# Patient Record
Sex: FEMALE | Race: WHITE | NOT HISPANIC OR LATINO | Employment: OTHER | URBAN - NONMETROPOLITAN AREA
[De-identification: names, ages, dates, MRNs, and addresses within clinical notes are randomized per-mention and may not be internally consistent; named-entity substitution may affect disease eponyms.]

---

## 2017-01-25 ENCOUNTER — TRANSCRIBE ORDERS (OUTPATIENT)
Dept: PHYSICAL THERAPY | Facility: HOSPITAL | Age: 52
End: 2017-01-25

## 2017-01-25 DIAGNOSIS — R07.89 POSTERIOR CHEST PAIN: Primary | ICD-10-CM

## 2017-02-01 ENCOUNTER — HOSPITAL ENCOUNTER (OUTPATIENT)
Dept: PHYSICAL THERAPY | Facility: HOSPITAL | Age: 52
Setting detail: THERAPIES SERIES
Discharge: HOME | End: 2017-02-01

## 2017-02-01 ENCOUNTER — APPOINTMENT (OUTPATIENT)
Dept: PHYSICAL THERAPY | Facility: HOSPITAL | Age: 52
End: 2017-02-01

## 2017-02-01 DIAGNOSIS — M54.50 CHRONIC BILATERAL LOW BACK PAIN WITHOUT SCIATICA: Primary | ICD-10-CM

## 2017-02-01 DIAGNOSIS — R07.81 RIB PAIN ON LEFT SIDE: ICD-10-CM

## 2017-02-01 DIAGNOSIS — G89.29 CHRONIC BILATERAL LOW BACK PAIN WITHOUT SCIATICA: Primary | ICD-10-CM

## 2017-02-01 DIAGNOSIS — M25.552 LEFT HIP PAIN: ICD-10-CM

## 2017-02-01 PROCEDURE — 97110 THERAPEUTIC EXERCISES: CPT | Performed by: PHYSICAL THERAPIST

## 2017-02-01 PROCEDURE — 97162 PT EVAL MOD COMPLEX 30 MIN: CPT | Performed by: PHYSICAL THERAPIST
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
## 2017-02-13 ENCOUNTER — HOSPITAL ENCOUNTER (OUTPATIENT)
Dept: PHYSICAL THERAPY | Facility: HOSPITAL | Age: 52
Setting detail: THERAPIES SERIES
Discharge: HOME | End: 2017-02-13

## 2017-02-13 DIAGNOSIS — M25.552 LEFT HIP PAIN: ICD-10-CM

## 2017-02-13 DIAGNOSIS — R07.81 RIB PAIN ON LEFT SIDE: ICD-10-CM

## 2017-02-13 DIAGNOSIS — M54.50 CHRONIC BILATERAL LOW BACK PAIN WITHOUT SCIATICA: Primary | ICD-10-CM

## 2017-02-13 DIAGNOSIS — G89.29 CHRONIC BILATERAL LOW BACK PAIN WITHOUT SCIATICA: Primary | ICD-10-CM

## 2017-02-13 PROCEDURE — 97110 THERAPEUTIC EXERCISES: CPT

## 2017-02-13 PROCEDURE — 97140 MANUAL THERAPY 1/> REGIONS: CPT

## 2017-02-13 NOTE — PROGRESS NOTES
Outpatient Physical Therapy Ortho Treatment Note  [facility]     Patient Name: Shirley Colby  : 1965  MRN: [id number]  Today's Date: 2017      Visit Date: 2017    Visit Dx:    ICD-10-CM ICD-9-CM   1. Chronic bilateral low back pain without sciatica M54.5 724.2    G89.29 338.29   2. Left hip pain M25.552 719.45   3. Rib pain on left side R07.81 786.50       There is no problem list on file for this patient.       Past Medical History   Diagnosis Date   • Chronic back pain    • Diabetes mellitus    • GERD (gastroesophageal reflux disease)    • Hyperlipidemia    • Hypertension    • Seizures         Past Surgical History   Procedure Laterality Date   • Hysterectomy                               PT Assessment/Plan       17 0918          PT Assessment    Assessment Comments This was patient's initial session post evaluation. She reported that her pain is about the same and she is having some difficulty with her HEP. Her L lower rib cage and lower thoracic are still painful along with her main complaint being just medial to her scapula and upward. Her thoracic pain may be due to a slightly rotated T11-12 segment towards the left, but she is so guarded it is difficult to truly assess. Her L shoulder blade pain appears to be from increased guarding as well along with increased scapular mobility compared to the right. She was heavily educated regarding pain and fear avoidance today, we will continue to address this and focus on promoting proper movement patterns.    -TC      PT Plan    PT Plan Comments see assessment   -TC        User Key  (r) = Recorded By, (t) = Taken By, (c) = Cosigned By    Initials Name Provider Type    TC Pam Clark, PT Physical Therapist                    Exercises       17 9517          Subjective Comments    Subjective Comments Pt reported that she still feels the same. Her pain is mainly in her L shoulder blade and up into her L neck today. Her L hip and  back are still the same pain  -TC      Subjective Pain    Able to rate subjective pain? yes  -TC      Pre-Treatment Pain Level 6  -TC      Post-Treatment Pain Level 6  -TC      Exercise 1    Exercise Name 1 thoracic rotation in sidelying    B  -TC      Cueing 1 Verbal;Tactile  -TC      Sets 1 2  -TC      Reps 1 15  -TC      Exercise 2    Exercise Name 2 sidelying ipsilateral L shldr blade squeeze  -TC      Cueing 2 Verbal;Tactile;Demo  -TC      Sets 2 2  -TC      Reps 2 10  -TC      Exercise 3    Exercise Name 3 unweighted shoulder blade sqeezes   -TC      Cueing 3 Verbal;Tactile;Demo  -TC      Time (Minutes) 3 5  -TC      Exercise 4    Exercise Name 4 unweighted cervical rotation   -TC      Cueing 4 Verbal;Tactile;Demo  -TC      Time (Minutes) 4 5  -TC      Exercise 5    Exercise Name 5 unweighted cervical flexion   -TC      Cueing 5 Demo  -TC      Time (Minutes) 5 5  -TC        User Key  (r) = Recorded By, (t) = Taken By, (c) = Cosigned By    Initials Name Provider Type    TC Pam Clark, PT Physical Therapist                        Manual Rx (last 36 hours)      Manual Treatments       02/13/17 0847          Manual Rx 1    Manual Rx 1 Location thoracolumbar rosmery   L T11-12 increased tenderness guarding noted   -TC      Manual Rx 1 Type STM   -TC      Manual Rx 1 Grade light pressure  -TC      Manual Rx 1 Duration 10  -TC      Manual Rx 2    Manual Rx 2 Location L medial border of scapula, rhomboids, LS and UT  -TC      Manual Rx 2 Type STM   -TC      Manual Rx 2 Grade light   -TC      Manual Rx 2 Duration 10  -TC        User Key  (r) = Recorded By, (t) = Taken By, (c) = Cosigned By    Initials Name Provider Type    TC Pam Clark, PT Physical Therapist                PT OP Goals       02/13/17 0847 02/01/17 0843       PT Short Term Goals    STG Date to Achieve 03/01/17  -TC 03/01/17  -KR     STG 1 Pt will report pain no greater than 4-5/10 with all daily activities.   -TC Pt will report pain no  greater than 4-5/10 with all daily activities.   -KR     STG 1 Progress Ongoing  -TC New  -KR     STG 1 Progress Comments 6/10 today   -TC      STG 2 Pt will score 50% or less on Oswestry.   -TC Pt will score 50% or less on Oswestry.   -KR     STG 2 Progress Ongoing  -TC New  -KR     Long Term Goals    LTG Date to Achieve 03/29/17  -TC 03/29/17  -KR     LTG 1 Pt will demontrate cervical rotation 65 degrees or greater.   -TC Pt will demontrate cervical rotation 65 degrees or greater.   -KR     LTG 1 Progress Ongoing  -TC New  -KR     LTG 1 Progress Comments worked on cervical motion today, not over 6/10 pain   -TC      LTG 2 Pt will demonstrated throacic rotation 50% or greater.   -TC Pt will demonstrated throacic rotation 50% or greater.   -KR     LTG 2 Progress Ongoing  -TC New  -KR     LTG 2 Progress Comments still very limited d/t guarding and pain   -TC      LTG 3 Pt will perform tandem stance for 20 seconds or greater.   -TC Pt will perform tandem stance for 20 seconds or greater.   -KR     LTG 3 Progress Ongoing  -TC New  -KR     LTG 4 Pt will report being able to complete hair hygene activities without increase in pain.   -TC Pt will report being able to complete hair hygene activities without increase in pain.   -KR     LTG 4 Progress Ongoing  -TC New  -KR     LTG 5 Pt will core 30% or less on Oswestry.   -TC Pt will core 30% or less on Oswestry.   -KR     LTG 5 Progress Ongoing  -TC New  -KR     Time Calculation    PT Goal Re-Cert Due Date 03/03/17  -TC 03/03/17  -KR       User Key  (r) = Recorded By, (t) = Taken By, (c) = Cosigned By    Initials Name Provider Type    GASTON Chirinos, PT Physical Therapist    TC Pam Clark, PT Physical Therapist                Therapy Education       02/13/17 0949    Therapy Education    Given HEP;Pain management;Posture/body mechanics   fear avoidance education, physiology of pain, good movement patterns, posture importance and added new HEP   -TC    Program  New   avoid Ws, added unweighted scap retraction, cervical rot and flexion   -TC    How Provided Verbal;Demonstration;Written  -TC    Provided to Patient  -TC    Level of Understanding Teach back education performed;Demonstrated;Verbalized  -TC      User Key  (r) = Recorded By, (t) = Taken By, (c) = Cosigned By    Initials Name Provider Type    TC Pam Clark, PT Physical Therapist                Time Calculation:   Start Time: 0847  Stop Time: 0934  Time Calculation (min): 47 min  Total Timed Code Minutes- PT: 47 minute(s)    Therapy Charges for Today     Code Description Service Date Service Provider Modifiers Qty    81843902193 HC PT MANUAL THERAPY EA 15 MIN 2/13/2017 Pam Clark, PT GP 1    31322177553 HC PT THER PROC EA 15 MIN 2/13/2017 Pam Clark, PT GP 2                    Pam Clark, PT  2/13/2017

## 2017-02-17 ENCOUNTER — HOSPITAL ENCOUNTER (OUTPATIENT)
Dept: PHYSICAL THERAPY | Facility: HOSPITAL | Age: 52
Setting detail: THERAPIES SERIES
Discharge: HOME | End: 2017-02-17

## 2017-02-17 DIAGNOSIS — M25.552 LEFT HIP PAIN: ICD-10-CM

## 2017-02-17 DIAGNOSIS — G89.29 CHRONIC BILATERAL LOW BACK PAIN WITHOUT SCIATICA: Primary | ICD-10-CM

## 2017-02-17 DIAGNOSIS — M54.50 CHRONIC BILATERAL LOW BACK PAIN WITHOUT SCIATICA: Primary | ICD-10-CM

## 2017-02-17 DIAGNOSIS — R07.81 RIB PAIN ON LEFT SIDE: ICD-10-CM

## 2017-02-17 PROCEDURE — 97140 MANUAL THERAPY 1/> REGIONS: CPT | Performed by: PHYSICAL THERAPIST

## 2017-02-17 PROCEDURE — 97110 THERAPEUTIC EXERCISES: CPT | Performed by: PHYSICAL THERAPIST

## 2017-02-17 NOTE — PROGRESS NOTES
Outpatient Physical Therapy Ortho Treatment Note  [facility]     Patient Name: Shirley Colby  : 1965  MRN: [id number]  Today's Date: 2017      Visit Date: 2017    Visit Dx:    ICD-10-CM ICD-9-CM   1. Chronic bilateral low back pain without sciatica M54.5 724.2    G89.29 338.29   2. Left hip pain M25.552 719.45   3. Rib pain on left side R07.81 786.50       There is no problem list on file for this patient.       Past Medical History   Diagnosis Date   • Chronic back pain    • Diabetes mellitus    • GERD (gastroesophageal reflux disease)    • Hyperlipidemia    • Hypertension    • Seizures         Past Surgical History   Procedure Laterality Date   • Hysterectomy                               PT Assessment/Plan       1730 17 0942       PT Assessment    Assessment Comments She does not tolerate manual therapy very well, even though we have educated on pain science and fear avoidance. She is still having muscle guarding throughout her left side causing decreased mobility and difficulty with ADLs  -KR This was patient's initial session post evaluation. She reported that her pain is about the same and she is having some difficulty with her HEP. Her L lower rib cage and lower thoracic are still painful along with her main complaint being just medial to her scapula and upward. Her thoracic pain may be due to a slightly rotated T11-12 segment towards the left, but she is so guarded it is difficult to truly assess. Her L shoulder blade pain appears to be from increased guarding as well along with increased scapular mobility compared to the right. She was heavily educated regarding pain and fear avoidance today, we will continue to address this and focus on promoting proper movement patterns.    -TC     PT Plan    PT Plan Comments Continue to educate on fear avoidance. Work soft tissue and progress with mobiltiy as tolerated.   -KR see assessment   -TC       User Key  (r) = Recorded By,  (t) = Taken By, (c) = Cosigned By    Initials Name Provider Type    GASTON Chirinos, PT Physical Therapist    TC Pam Clark, PT Physical Therapist                    Exercises       02/17/17 0930          Subjective Comments    Subjective Comments She reports hurting today, didn't sleep well last night.   -KR      Subjective Pain    Able to rate subjective pain? yes  -KR      Pre-Treatment Pain Level 6  -KR      Post-Treatment Pain Level 6  -KR      Subjective Pain Comment L neck and lower back and  breast  -KR      Exercise 1    Exercise Name 1 hooklying LTR in very small motion  -KR      Sets 1 2  -KR      Reps 1 10  -KR      Exercise 2    Exercise Name 2 PPT   -KR      Sets 2 2  -KR      Reps 2 10  -KR      Exercise 3    Exercise Name 3 unweighted in chair with towel roll behind thoracic spine; cervical rotation, then scapular squeezes, cervical flexion  -KR      Sets 3 2  -KR      Reps 3 10  -KR      Exercise 4    Exercise Name 4 seated unsupported scapular reraction in small motion  -KR      Sets 4 3  -KR      Reps 4 5  -KR        User Key  (r) = Recorded By, (t) = Taken By, (c) = Cosigned By    Initials Name Provider Type    GASTON Chirinos, PT Physical Therapist                        Manual Rx (last 36 hours)      Manual Treatments       02/17/17 0930          Manual Rx 1    Manual Rx 1 Location prone thoracolumbar paraspinals  -KR      Manual Rx 1 Type STM  -KR      Manual Rx 1 Grade very light  -KR      Manual Rx 1 Duration 15  -KR        User Key  (r) = Recorded By, (t) = Taken By, (c) = Cosigned By    Initials Name Provider Type    GASTON Chirinos PT Physical Therapist                PT OP Goals       02/17/17 0930 02/13/17 0847       PT Short Term Goals    STG Date to Achieve 03/01/17  -KR 03/01/17  -TC     STG 1 Pt will report pain no greater than 4-5/10 with all daily activities.   -KR Pt will report pain no greater than 4-5/10 with all daily activities.   -TC     STG 1  Progress Ongoing  -KR Ongoing  -TC     STG 1 Progress Comments 6/10 today  -KR 6/10 today   -TC     STG 2 Pt will score 50% or less on Oswestry.   -KR Pt will score 50% or less on Oswestry.   -TC     STG 2 Progress Ongoing  -KR Ongoing  -TC     Long Term Goals    LTG Date to Achieve 03/29/17  -KR 03/29/17  -TC     LTG 1 Pt will demontrate cervical rotation 65 degrees or greater.   -KR Pt will demontrate cervical rotation 65 degrees or greater.   -TC     LTG 1 Progress Ongoing  -KR Ongoing  -TC     LTG 1 Progress Comments 45 L 35 R (pain R)  -KR worked on cervical motion today, not over 6/10 pain   -TC     LTG 2 Pt will demonstrated throacic rotation 50% or greater.   -KR Pt will demonstrated throacic rotation 50% or greater.   -TC     LTG 2 Progress Ongoing  -KR Ongoing  -TC     LTG 2 Progress Comments  still very limited d/t guarding and pain   -TC     LTG 3 Pt will perform tandem stance for 20 seconds or greater.   -KR Pt will perform tandem stance for 20 seconds or greater.   -TC     LTG 3 Progress Ongoing  -KR Ongoing  -TC     LTG 4 Pt will report being able to complete hair hygene activities without increase in pain.   -KR Pt will report being able to complete hair hygene activities without increase in pain.   -TC     LTG 4 Progress Ongoing  -KR Ongoing  -TC     LTG 5 Pt will core 30% or less on Oswestry.   -KR Pt will core 30% or less on Oswestry.   -     LTG 5 Progress Ongoing  -KR Ongoing  -     Time Calculation    PT Goal Re-Cert Due Date 03/03/17  -KR 03/03/17  -TC       User Key  (r) = Recorded By, (t) = Taken By, (c) = Cosigned By    Initials Name Provider Type    GASTON Chirinos, PT Physical Therapist    TC Pam Clark, PT Physical Therapist                Therapy Education       02/17/17 0930    Therapy Education    Given HEP;Symptoms/condition management;Posture/body mechanics;Pain management  -KR    Program Reinforced  -KR    How Provided Verbal  -KR    Provided to Patient  -KR     Level of Understanding Verbalized  -KR      02/13/17 0941    Therapy Education    Given HEP;Pain management;Posture/body mechanics   fear avoidance education, physiology of pain, good movement patterns, posture importance and added new HEP   -TC    Program New   avoid Ws, added unweighted scap retraction, cervical rot and flexion   -TC    How Provided Verbal;Demonstration;Written  -TC    Provided to Patient  -TC    Level of Understanding Teach back education performed;Demonstrated;Verbalized  -TC      User Key  (r) = Recorded By, (t) = Taken By, (c) = Cosigned By    Initials Name Provider Type    GASTON Chirinos, PT Physical Therapist    TC Pam Clark, PT Physical Therapist                Time Calculation:   Start Time: 0930  Stop Time: 1012  Time Calculation (min): 42 min  Total Timed Code Minutes- PT: 42 minute(s)    Therapy Charges for Today     Code Description Service Date Service Provider Modifiers Qty    [id number]  PT THER PROC EA 15 MIN 2/17/2017 Jessica Chirinos, PT GP 2    [id number]  PT MANUAL THERAPY EA 15 MIN 2/17/2017 Jessica Chirinos, PT GP 1                    Jessica Chirinos, PT  2/17/2017

## 2017-02-23 ENCOUNTER — HOSPITAL ENCOUNTER (OUTPATIENT)
Dept: PHYSICAL THERAPY | Facility: HOSPITAL | Age: 52
Setting detail: THERAPIES SERIES
Discharge: HOME | End: 2017-02-23

## 2017-02-23 DIAGNOSIS — R07.81 RIB PAIN ON LEFT SIDE: ICD-10-CM

## 2017-02-23 DIAGNOSIS — M54.50 CHRONIC BILATERAL LOW BACK PAIN WITHOUT SCIATICA: Primary | ICD-10-CM

## 2017-02-23 DIAGNOSIS — M25.552 LEFT HIP PAIN: ICD-10-CM

## 2017-02-23 DIAGNOSIS — G89.29 CHRONIC BILATERAL LOW BACK PAIN WITHOUT SCIATICA: Primary | ICD-10-CM

## 2017-02-23 PROCEDURE — 97140 MANUAL THERAPY 1/> REGIONS: CPT | Performed by: PHYSICAL THERAPIST

## 2017-02-23 PROCEDURE — 97110 THERAPEUTIC EXERCISES: CPT | Performed by: PHYSICAL THERAPIST

## 2017-02-23 NOTE — PROGRESS NOTES
Outpatient Physical Therapy Ortho Treatment Note  [facility]     Patient Name: Shirley Colby  : 1965  MRN: [id number]  Today's Date: 2017      Visit Date: 2017    Visit Dx:    ICD-10-CM ICD-9-CM   1. Chronic bilateral low back pain without sciatica M54.5 724.2    G89.29 338.29   2. Left hip pain M25.552 719.45   3. Rib pain on left side R07.81 786.50       There is no problem list on file for this patient.       Past Medical History   Diagnosis Date   • Chronic back pain    • Diabetes mellitus    • GERD (gastroesophageal reflux disease)    • Hyperlipidemia    • Hypertension    • Seizures         Past Surgical History   Procedure Laterality Date   • Hysterectomy                               PT Assessment/Plan       17       PT Assessment    Assessment Comments Her cervical ROM is improving. She struggled with the PPT, so I took this out of her home program. She is still deonstrating poor posture, core/hip control. We are limited with manual techniques secondary to patient tolerance.   -KR She does not tolerate manual therapy very well, even though we have educated on pain science and fear avoidance. She is still having muscle guarding throughout her left side causing decreased mobility and difficulty with ADLs  -KR     PT Plan    PT Plan Comments Next visit is her last scheduled visit, she has onlyl attended 3 visits since initial evalution. She will need a re-cert prior to the end of next week.   -KR Continue to educate on fear avoidance. Work soft tissue and progress with mobiltiy as tolerated.   -KR       User Key  (r) = Recorded By, (t) = Taken By, (c) = Cosigned By    Initials Name Provider Type    GASTON Chirinos, PT DPT Physical Therapist                    Exercises       17          Subjective Comments    Subjective Comments She reports was hurting Monday, so didn't come. She repots not sleeping well. She repots having injections in lower  back yesterday. SHe reports doing well with her exercises, but does questions her PPT.   -KR      Subjective Pain    Able to rate subjective pain? yes  -KR      Pre-Treatment Pain Level 5  -KR      Post-Treatment Pain Level --   7.5 L hip/lower back  -KR      Subjective Pain Comment L hip  -KR      Exercise 1    Exercise Name 1 PPT  -KR      Cueing 1 Verbal;Demo;Tactile  -KR      Time (Minutes) 1 5  -KR      Exercise 2    Exercise Name 2 sidelying clam   -KR      Sets 2 2  -KR      Reps 2 10  -KR      Exercise 3    Exercise Name 3 sidelying thoracic rotation  -KR      Sets 3 2  -KR      Reps 3 10  -KR      Exercise 4    Exercise Name 4 standing against pink foam on wall UE phasic  -KR      Sets 4 3  -KR      Reps 4 10  -KR      Exercise 5    Exercise Name 5 seated against 1/2 foam, scapular retractions  -KR      Sets 5 3  -KR      Reps 5 10  -KR      Exercise 6    Exercise Name 6 seated at EOB focusing on posture, worked seated marchces  -KR      Sets 6 2  -KR      Reps 6 20  -KR      Exercise 7    Exercise Name 7 discussed posture, weight of purse, HEP, muscle soreness vs pain  -KR        User Key  (r) = Recorded By, (t) = Taken By, (c) = Cosigned By    Initials Name Provider Type    GASTON Chirinos, PT DPT Physical Therapist                        Manual Rx (last 36 hours)      Manual Treatments       02/23/17 0848          Manual Rx 1    Manual Rx 1 Location prone thoracolumbar paraspinals and gluts  -KR      Manual Rx 1 Type STM  -KR      Manual Rx 1 Grade very light  -KR      Manual Rx 1 Duration 10  -KR        User Key  (r) = Recorded By, (t) = Taken By, (c) = Cosigned By    Initials Name Provider Type    GASTON Chirinos, PT DPT Physical Therapist                PT OP Goals       02/23/17 0848 02/17/17 0930 02/13/17 0847    PT Short Term Goals    STG Date to Achieve 03/01/17  -KR 03/01/17  -KR 03/01/17  -TC    STG 1 Pt will report pain no greater than 4-5/10 with all daily activities.   -KR Pt will  report pain no greater than 4-5/10 with all daily activities.   -KR Pt will report pain no greater than 4-5/10 with all daily activities.   -TC    STG 1 Progress Ongoing  -KR Ongoing  -KR Ongoing  -TC    STG 1 Progress Comments 5/10 at L hip today  -KR 6/10 today  -KR 6/10 today   -TC    STG 2 Pt will score 50% or less on Oswestry.   -KR Pt will score 50% or less on Oswestry.   -KR Pt will score 50% or less on Oswestry.   -TC    STG 2 Progress Ongoing  -KR Ongoing  -KR Ongoing  -TC    Long Term Goals    LTG Date to Achieve 03/29/17  -KR 03/29/17  -KR 03/29/17  -TC    LTG 1 Pt will demontrate cervical rotation 65 degrees or greater.   -KR Pt will demontrate cervical rotation 65 degrees or greater.   -KR Pt will demontrate cervical rotation 65 degrees or greater.   -TC    LTG 1 Progress Ongoing  -KR Ongoing  -KR Ongoing  -    LTG 1 Progress Comments R 58 L 53  -KR 45 L 35 R (pain R)  -KR worked on cervical motion today, not over 6/10 pain   -    LTG 2 Pt will demonstrated throacic rotation 50% or greater.   -KR Pt will demonstrated throacic rotation 50% or greater.   -KR Pt will demonstrated throacic rotation 50% or greater.   -TC    LTG 2 Progress Ongoing  -KR Ongoing  -KR Ongoing  -    LTG 2 Progress Comments   still very limited d/t guarding and pain   -    LTG 3 Pt will perform tandem stance for 20 seconds or greater.   -KR Pt will perform tandem stance for 20 seconds or greater.   -KR Pt will perform tandem stance for 20 seconds or greater.   -TC    LTG 3 Progress Ongoing  -KR Ongoing  -KR Ongoing  -    LTG 4 Pt will report being able to complete hair hygene activities without increase in pain.   -KR Pt will report being able to complete hair hygene activities without increase in pain.   -KR Pt will report being able to complete hair hygene activities without increase in pain.   -TC    LTG 4 Progress Ongoing  -KR Ongoing  -KR Ongoing  -    LTG 5 Pt will core 30% or less on Oswestry.   -KR Pt will core  30% or less on Oswestry.   -KR Pt will core 30% or less on Oswestry.   -TC    LTG 5 Progress Ongoing  -KR Ongoing  -KR Ongoing  -TC    Time Calculation    PT Goal Re-Cert Due Date 03/03/17  -KR 03/03/17  -KR 03/03/17  -TC      User Key  (r) = Recorded By, (t) = Taken By, (c) = Cosigned By    Initials Name Provider Type    GASTON Chirinos, PT DPT Physical Therapist    TC Pam Clark, PT Physical Therapist                Therapy Education       02/23/17 0848    Therapy Education    Given HEP;Posture/body mechanics;Symptoms/condition management  -KR    Program Reinforced  -KR    How Provided Verbal  -KR    Provided to Patient  -KR    Level of Understanding Verbalized  -KR      02/17/17 0930    Therapy Education    Given HEP;Symptoms/condition management;Posture/body mechanics;Pain management  -KR    Program Reinforced  -KR    How Provided Verbal  -KR    Provided to Patient  -KR    Level of Understanding Verbalized  -KR      User Key  (r) = Recorded By, (t) = Taken By, (c) = Cosigned By    Initials Name Provider Type    GASTON Chirinos, PT DPT Physical Therapist                Time Calculation:   Start Time: 0848  Stop Time: 0932  Time Calculation (min): 44 min  Total Timed Code Minutes- PT: 44 minute(s)                  Jessica Chirinos, PT DPT  2/23/2017

## 2017-02-27 ENCOUNTER — APPOINTMENT (OUTPATIENT)
Dept: PHYSICAL THERAPY | Facility: HOSPITAL | Age: 52
End: 2017-02-27

## 2017-03-03 ENCOUNTER — APPOINTMENT (OUTPATIENT)
Dept: PHYSICAL THERAPY | Facility: HOSPITAL | Age: 52
End: 2017-03-03

## 2017-03-06 ENCOUNTER — HOSPITAL ENCOUNTER (OUTPATIENT)
Dept: PHYSICAL THERAPY | Facility: HOSPITAL | Age: 52
Setting detail: THERAPIES SERIES
Discharge: HOME | End: 2017-03-06

## 2017-03-06 DIAGNOSIS — R07.81 RIB PAIN ON LEFT SIDE: ICD-10-CM

## 2017-03-06 DIAGNOSIS — M54.50 CHRONIC BILATERAL LOW BACK PAIN WITHOUT SCIATICA: ICD-10-CM

## 2017-03-06 DIAGNOSIS — M25.552 LEFT HIP PAIN: Primary | ICD-10-CM

## 2017-03-06 DIAGNOSIS — G89.29 CHRONIC BILATERAL LOW BACK PAIN WITHOUT SCIATICA: ICD-10-CM

## 2017-03-06 PROCEDURE — 97110 THERAPEUTIC EXERCISES: CPT | Performed by: PHYSICAL THERAPIST

## 2017-03-06 NOTE — PROGRESS NOTES
Outpatient Physical Therapy Ortho Progress Note  [facility]     Patient Name: Shirley Colby  : 1965  MRN: [id number]  Today's Date: 3/6/2017      Visit Date: 2017    Visit Dx:    ICD-10-CM ICD-9-CM   1. Left hip pain M25.552 719.45   2. Rib pain on left side R07.81 786.50   3. Chronic bilateral low back pain without sciatica M54.5 724.2    G89.29 338.29       There is no problem list on file for this patient.       Past Medical History   Diagnosis Date   • Chronic back pain    • Diabetes mellitus    • GERD (gastroesophageal reflux disease)    • Hyperlipidemia    • Hypertension    • Seizures         Past Surgical History   Procedure Laterality Date   • Hysterectomy                               PT Assessment/Plan       17 0930       PT Assessment    Functional Limitations Impaired gait;Limitation in home management;Limitations in community activities;Performance in leisure activities  -KR     Impairments Balance;Gait;Muscle strength;Pain;Posture;Range of motion;Joint mobility  -KR     Assessment Comments This was only her 4th visit since her initla evaluation on 17. She was sick all last week. She is not completely compliant with her given HEP, but I gave her an updated one today. She is very fearful of movement and tends to be very guarded. We have educated ini depth in regards to this. She is reporting no change in lower back/L hip, but also reports these being chronic. She reports no pain in her shoulder or thoracic spine today, but still having neck pain. Her cervical mobility is more restricted today. She also has increased anterior pelvic tilt and tends to hinge in her lower lumbar segments. She is still limited with her posture and core strength as well. We are also limited with positional tolerance and tolerance to manual therapy secondary to patient tolerace.   -KR     Please refer to paper survey for additional self-reported information Yes  -KR     Rehab Potential Good  -KR      Patient/caregiver participated in establishment of treatment plan and goals Yes  -KR     Patient would benefit from skilled therapy intervention Yes  -KR     PT Plan    PT Frequency 2x/week  -KR     Predicted Duration of Therapy Intervention (days/wks) 4 weeks  -KR     Planned CPT's? PT THER PROC EA 15 MIN: 94490;PT THER ACT EA 15 MIN: 68107;PT MANUAL THERAPY EA 15 MIN: 20576;PT NEUROMUSC RE-EDUCATION EA 15 MIN: 01890;PT GAIT TRAINING EA 15 MIN: 26336;PT ELECTRICAL STIM UNATTEND: ;PT ELECTRICAL STIM ATTD EA 15 MIN: 28842  -KR     PT Plan Comments continue to work on improving her mobiltiy and progressing with postural activities as tolerated.   -KR       User Key  (r) = Recorded By, (t) = Taken By, (c) = Cosigned By    Initials Name Provider Type    GASTON Chirinos, PT DPT Physical Therapist                    Exercises       03/06/17 0930          Subjective Comments    Subjective Comments No pain in shoulder currently. She reports still having difficulty getting in/out of bed. She reports not doing all of her home program. She reports L hip and lower back are chronic in nature. She reports sinus infection last week and hasn't been sleeping well. She reports never sleeping well though. She repots she is not able to walk her normal 4 miles per day.   -KR      Subjective Pain    Able to rate subjective pain? yes  -KR      Pre-Treatment Pain Level 6  -KR      Subjective Pain Comment L hip/lower back; 3-4/10 in neck  -KR      Exercise 1    Exercise Name 1 assessed all goals for re-cert  -KR      Exercise 2    Exercise Name 2 PPT   -KR      Cueing 2 Verbal  -KR      Sets 2 2  -KR      Reps 2 10  -KR      Exercise 3    Exercise Name 3 sidelying clams  -KR      Sets 3 2  -KR      Reps 3 10  -KR      Exercise 4    Exercise Name 4 sidelying thoracic rotation  -KR      Reps 4 10  -KR      Exercise 5    Exercise Name 5 discussed fear avoidance and increase in activity.   -KR        User Key  (r) = Recorded By,  (t) = Taken By, (c) = Cosigned By    Initials Name Provider Type    GASTON Chirinos, PT DPT Physical Therapist                               PT OP Goals       03/06/17 0930       PT Short Term Goals    STG Date to Achieve 03/20/17  -KR     STG 1 Pt will report pain no greater than 4-5/10 with all daily activities.   -KR     STG 1 Progress Ongoing  -KR     STG 1 Progress Comments 3-6/10   -KR     STG 2 Pt will score 50% or less on Oswestry.   -KR     STG 2 Progress Ongoing  -KR     STG 2 Progress Comments 57%  -KR     Long Term Goals    LTG Date to Achieve 04/03/17  -KR     LTG 1 Pt will demontrate cervical rotation 65 degrees or greater.   -KR     LTG 1 Progress Ongoing  -KR     LTG 1 Progress Comments 45 degrees B   -KR     LTG 2 Pt will demonstrated throacic rotation 50% or greater.   -KR     LTG 2 Progress Ongoing  -KR     LTG 2 Progress Comments 25%, limited by pain per patient  -KR     LTG 3 Pt will perform tandem stance for 20 seconds or greater.   -KR     LTG 3 Progress Ongoing  -KR     LTG 3 Progress Comments 18 seconds  -KR     LTG 4 Pt will report being able to complete hair hygene activities without increase in pain.   -KR     LTG 4 Progress Met  -KR     LTG 5 Pt will core 30% or less on Oswestry.   -KR     LTG 5 Progress Ongoing  -KR     LTG 5 Progress Comments 57%  -KR     Time Calculation    PT Goal Re-Cert Due Date 04/05/17  -KR       User Key  (r) = Recorded By, (t) = Taken By, (c) = Cosigned By    Initials Name Provider Type    GASTON Chirinos, PT DPT Physical Therapist                Therapy Education       03/06/17 0930          Therapy Education    Given HEP;Symptoms/condition management;Posture/body mechanics  -KR      Program New   PPT, sidelyling clam, sidelying thoracic rotation  -KR      How Provided Verbal;Demonstration;Written  -KR      Provided to Patient  -KR      Level of Understanding Verbalized  -KR        User Key  (r) = Recorded By, (t) = Taken By, (c) = Cosigned By     Initials Name Provider Type    GASTON Chirinos, PT DPT Physical Therapist                Outcome Measures       03/06/17 0930          Modified Oswestry    Modified Oswestry Score/Comments 26/45 57 %  -GASTON      Functional Assessment    Outcome Measure Options Kaitlin Adrian  -GASTON        User Key  (r) = Recorded By, (t) = Taken By, (c) = Cosigned By    Initials Name Provider Type    GASTON Chirinos, PT DPT Physical Therapist            Time Calculation:   Start Time: 0930  Stop Time: 1009  Time Calculation (min): 39 min  Total Timed Code Minutes- PT: 39 minute(s)    Therapy Charges for Today     Code Description Service Date Service Provider Modifiers Qty    [id number] HC PT THER PROC EA 15 MIN 3/6/2017 Jessica Chirinos, PT DPT GP 3          PT G-Tori  Outcome Measure Options: Kaitlin Chirinos, PT DPT  3/6/2017

## 2017-03-09 ENCOUNTER — HOSPITAL ENCOUNTER (OUTPATIENT)
Dept: PHYSICAL THERAPY | Facility: HOSPITAL | Age: 52
Setting detail: THERAPIES SERIES
Discharge: HOME | End: 2017-03-09

## 2017-03-09 ENCOUNTER — APPOINTMENT (OUTPATIENT)
Dept: PHYSICAL THERAPY | Facility: HOSPITAL | Age: 52
End: 2017-03-09

## 2017-03-09 DIAGNOSIS — M54.50 CHRONIC BILATERAL LOW BACK PAIN WITHOUT SCIATICA: ICD-10-CM

## 2017-03-09 DIAGNOSIS — R07.81 RIB PAIN ON LEFT SIDE: ICD-10-CM

## 2017-03-09 DIAGNOSIS — G89.29 CHRONIC BILATERAL LOW BACK PAIN WITHOUT SCIATICA: ICD-10-CM

## 2017-03-09 DIAGNOSIS — M25.552 LEFT HIP PAIN: Primary | ICD-10-CM

## 2017-03-09 PROCEDURE — 97140 MANUAL THERAPY 1/> REGIONS: CPT

## 2017-03-09 PROCEDURE — 97110 THERAPEUTIC EXERCISES: CPT

## 2017-03-09 NOTE — PROGRESS NOTES
Outpatient Physical Therapy Ortho Treatment Note  [facility]     Patient Name: Shirley Colby  : 1965  MRN: [id number]  Today's Date: 3/9/2017      Visit Date: 2017    Visit Dx:    ICD-10-CM ICD-9-CM   1. Left hip pain M25.552 719.45   2. Rib pain on left side R07.81 786.50   3. Chronic bilateral low back pain without sciatica M54.5 724.2    G89.29 338.29       There is no problem list on file for this patient.       Past Medical History   Diagnosis Date   • Chronic back pain    • Diabetes mellitus    • GERD (gastroesophageal reflux disease)    • Hyperlipidemia    • Hypertension    • Seizures         Past Surgical History   Procedure Laterality Date   • Hysterectomy                               PT Assessment/Plan       17 08       PT Assessment    Assessment Comments Today we introduced diaphragmatic breathing and spoke at length about graded exposure to activity.  The patient still does not do well with manual therapy and was educated today that she is physiologically healed but overall guarded due to fear of motion.  Patient was educated about resuming the Yoga class to work on graded activitiy.  -RS     PT Plan    PT Plan Comments continue to stress diaphragmatic breathing and graded exposure.    -RS       User Key  (r) = Recorded By, (t) = Taken By, (c) = Cosigned By    Initials Name Provider Type    RS Surendra Carter, PT DPT Physical Therapist                    Exercises       17 0800          Subjective Comments    Subjective Comments Patient is hurting more today.  She is wanting to start Yoga again.  -RS      Subjective Pain    Able to rate subjective pain? yes  -RS      Pre-Treatment Pain Level 7  -RS      Post-Treatment Pain Level 6  -RS      Subjective Pain Comment rib > hip  -RS      Exercise 1    Exercise Name 1 education on walking with trunk rotation in hallway:  avoidance of trunk guarding.    -RS      Time (Minutes) 1 8  -RS      Exercise 2    Exercise Name  2 sitting diaphragmatic breathing with theraband around lower rib cage.   -RS      Sets 2 3  -RS      Reps 2 5  -RS      Time (Seconds) 2 5 sec hold  -RS        User Key  (r) = Recorded By, (t) = Taken By, (c) = Cosigned By    Initials Name Provider Type    RS Surendra Carter, PT DPT Physical Therapist                        Manual Rx (last 36 hours)      Manual Treatments       03/09/17 1100          Manual Rx 1    Manual Rx 1 Location thoracolumbar rosmery   L T11-12 increased tenderness guarding noted   -RS      Manual Rx 1 Type STM   -RS      Manual Rx 1 Grade light pressure  -RS      Manual Rx 1 Duration 15  -RS      Manual Rx 2    Manual Rx 2 Location L medial border of scapula, rhomboids, LS and UT  -RS      Manual Rx 2 Type STM   -RS      Manual Rx 2 Grade light   -RS      Manual Rx 2 Duration 5  -RS      Manual Rx 3    Manual Rx 3 Location (R) sidelying:  T8-9  -RS      Manual Rx 3 Type oscillatory  -RS      Manual Rx 3 Grade 2  -RS      Manual Rx 3 Duration 5  -RS        User Key  (r) = Recorded By, (t) = Taken By, (c) = Cosigned By    Initials Name Provider Type    RS Surendra Carter, PT DPT Physical Therapist                PT OP Goals       03/09/17 1100       PT Short Term Goals    STG Date to Achieve 03/20/17  -RS     STG 1 Pt will report pain no greater than 4-5/10 with all daily activities.   -RS     STG 1 Progress Ongoing  -RS     STG 2 Pt will score 50% or less on Oswestry.   -RS     STG 2 Progress Ongoing  -RS     Long Term Goals    LTG Date to Achieve 04/03/17  -RS     LTG 1 Pt will demontrate cervical rotation 65 degrees or greater.   -RS     LTG 1 Progress Ongoing  -RS     LTG 2 Pt will demonstrated throacic rotation 50% or greater.   -RS     LTG 2 Progress Ongoing  -RS     LTG 3 Pt will perform tandem stance for 20 seconds or greater.   -RS     LTG 3 Progress Ongoing  -RS     LTG 4 Pt will report being able to complete hair hygene activities without increase in pain.   -RS     LTG 4  Progress Met  -RS     LTG 5 Pt will core 30% or less on Oswestry.   -RS     LTG 5 Progress Ongoing  -RS     Time Calculation    PT Goal Re-Cert Due Date 04/05/17  -RS       User Key  (r) = Recorded By, (t) = Taken By, (c) = Cosigned By    Initials Name Provider Type    RS Surendra Carter, PT DPT Physical Therapist                Therapy Education       03/09/17 1100          Therapy Education    Given HEP  -RS      Program New   diaphragmatic breathing with red theraband 3 x 5 sets;   -RS      How Provided Demonstration;Verbal  -RS      Provided to Patient  -RS      Level of Understanding Verbalized  -RS        User Key  (r) = Recorded By, (t) = Taken By, (c) = Cosigned By    Initials Name Provider Type    MARJORIE Carter, PT DPT Physical Therapist                Time Calculation:   Start Time: 0805  Stop Time: 0850  Time Calculation (min): 45 min  PT Non-Billable Time (min): 5 min  Total Timed Code Minutes- PT: 40 minute(s)    Therapy Charges for Today     Code Description Service Date Service Provider Modifiers Qty    82580526600 HC PT MANUAL THERAPY EA 15 MIN 3/9/2017 Surendra Carter, PT DPT GP 2    21634198694 HC PT THER PROC EA 15 MIN 3/9/2017 Surendra Carter, PT DPT GP 1                    SEAN Carter, PT DPT  3/9/2017

## 2017-03-13 ENCOUNTER — HOSPITAL ENCOUNTER (OUTPATIENT)
Dept: PHYSICAL THERAPY | Facility: HOSPITAL | Age: 52
Setting detail: THERAPIES SERIES
Discharge: HOME | End: 2017-03-13

## 2017-03-13 DIAGNOSIS — M25.552 LEFT HIP PAIN: Primary | ICD-10-CM

## 2017-03-13 DIAGNOSIS — R07.81 RIB PAIN ON LEFT SIDE: ICD-10-CM

## 2017-03-13 DIAGNOSIS — G89.29 CHRONIC BILATERAL LOW BACK PAIN WITHOUT SCIATICA: ICD-10-CM

## 2017-03-13 DIAGNOSIS — M54.50 CHRONIC BILATERAL LOW BACK PAIN WITHOUT SCIATICA: ICD-10-CM

## 2017-03-13 PROCEDURE — 97110 THERAPEUTIC EXERCISES: CPT | Performed by: PHYSICAL THERAPIST

## 2017-03-13 PROCEDURE — 97140 MANUAL THERAPY 1/> REGIONS: CPT | Performed by: PHYSICAL THERAPIST

## 2017-03-13 NOTE — PROGRESS NOTES
Outpatient Physical Therapy Ortho Treatment Note  [facility]     Patient Name: Shirley Colby  : 1965  MRN: [id number]  Today's Date: 3/13/2017      Visit Date: 2017    Visit Dx:    ICD-10-CM ICD-9-CM   1. Left hip pain M25.552 719.45   2. Rib pain on left side R07.81 786.50   3. Chronic bilateral low back pain without sciatica M54.5 724.2    G89.29 338.29       There is no problem list on file for this patient.       Past Medical History   Diagnosis Date   • Chronic back pain    • Diabetes mellitus    • GERD (gastroesophageal reflux disease)    • Hyperlipidemia    • Hypertension    • Seizures         Past Surgical History   Procedure Laterality Date   • Hysterectomy                               PT Assessment/Plan       17       PT Assessment    Assessment Comments She again is still very fearfull of movement. She reports only walking once over the weekend and not completing yoga. We are still limtied with manual secondary to patient guarding and tolerance. Her cervical motion did show slight improvements,, but she is still lacking.   -KR     PT Plan    PT Plan Comments Continue to work mobility and progressing activity as tolerated.   -KR       User Key  (r) = Recorded By, (t) = Taken By, (c) = Cosigned By    Initials Name Provider Type    GASTON Chirinos, PT DPT Physical Therapist                    Exercises       17          Subjective Comments    Subjective Comments She said she just walked 8 trailer lengths down and back once over the weekend. She reports hasn't done her yoga. She is reporting some popping in her neck with movement/strentches, but not pain. She reporots L pinkie hurts, but thinking it is arthritis. She reports occassional hard time gripping. She reports not being able to get out of bed on , not going to [employer]. She reports feeling like her neck motion is better.   -GASTON      Subjective Pain    Able to rate subjective pain? yes  -GASTON       Pre-Treatment Pain Level 7  -KR      Post-Treatment Pain Level --   did not report increase in pain, just reported tired.   -KR      Subjective Pain Comment L neck; rib/hip 4/10   -KR      Exercise 1    Exercise Name 1 chin tuck with rotation  -KR      Cueing 1 Verbal  -KR      Reps 1 10  -KR      Exercise 2    Exercise Name 2 LTR on SB   -KR      Cueing 2 Verbal  -KR      Reps 2 10  -KR      Exercise 3    Exercise Name 3 sidelying clam  -KR      Sets 3 2  -KR      Reps 3 10  -KR      Exercise 4    Exercise Name 4 seated UE phasic  -KR      Sets 4 2  -KR      Reps 4 10  -KR      Exercise 5    Exercise Name 5 1 lap around the facility, needed cues to prevent hip drop and anterior pelvic tilt. Pt was less stiff overall with ambulation  -KR      Exercise 6    Exercise Name 6 seated hip flexion  -KR      Sets 6 2  -KR      Reps 6 10  -KR      Exercise 7    Exercise Name 7 mini wall squat   -KR      Sets 7 2  -KR      Reps 7 5  -KR        User Key  (r) = Recorded By, (t) = Taken By, (c) = Cosigned By    Initials Name Provider Type    GASTON Chiirnos, PT DPT Physical Therapist                        Manual Rx (last 36 hours)      Manual Treatments       03/13/17 0844          Manual Rx 1    Manual Rx 1 Location hooklying with bolster under knees STM cervical rosmery and UT/LS  -KR      Manual Rx 1 Grade very light  -KR      Manual Rx 1 Duration 9  -KR        User Key  (r) = Recorded By, (t) = Taken By, (c) = Cosigned By    Initials Name Provider Type    GASTON Chirinos, PT DPT Physical Therapist                PT OP Goals       03/13/17 0844       PT Short Term Goals    STG Date to Achieve 03/20/17  -KR     STG 1 Pt will report pain no greater than 4-5/10 with all daily activities.   -KR     STG 1 Progress Ongoing  -KR     STG 1 Progress Comments 4/10 ribs/hip, 7/10 neck  -KR     STG 2 Pt will score 50% or less on Oswestry.   -KR     STG 2 Progress Ongoing  -KR     Long Term Goals    LTG Date to Achieve 04/03/17   -KR     LTG 1 Pt will demontrate cervical rotation 65 degrees or greater.   -KR     LTG 1 Progress Ongoing  -KR     LTG 1 Progress Comments 48 L 50 R   -KR     LTG 2 Pt will demonstrated throacic rotation 50% or greater.   -KR     LTG 2 Progress Ongoing  -KR     LTG 3 Pt will perform tandem stance for 20 seconds or greater.   -KR     LTG 3 Progress Ongoing  -KR     LTG 4 Pt will report being able to complete hair hygene activities without increase in pain.   -KR     LTG 4 Progress Met  -KR     LTG 5 Pt will core 30% or less on Oswestry.   -KR     LTG 5 Progress Ongoing  -KR     Time Calculation    PT Goal Re-Cert Due Date 04/05/17  -KR       User Key  (r) = Recorded By, (t) = Taken By, (c) = Cosigned By    Initials Name Provider Type    GASTON Chirinos PT DPT Physical Therapist                Therapy Education       03/13/17 0844          Therapy Education    Given HEP;Posture/body mechanics;Symptoms/condition management  -KR      Program Reinforced  -KR      How Provided Verbal  -KR      Provided to Patient  -KR      Level of Understanding Verbalized  -KR        User Key  (r) = Recorded By, (t) = Taken By, (c) = Cosigned By    Initials Name Provider Type    GASTON Chirinos, PT DPT Physical Therapist                Time Calculation:   Start Time: 0844  Stop Time: 0929  Time Calculation (min): 45 min  Total Timed Code Minutes- PT: 45 minute(s)    Therapy Charges for Today     Code Description Service Date Service Provider Modifiers Qty    57413411436 HC PT MANUAL THERAPY EA 15 MIN 3/13/2017 Jessica Chirinos, PT DPT GP 1    51222173187 HC PT THER PROC EA 15 MIN 3/13/2017 Jessica Chirinos PT DPT GP 2                    Jessica Chirinos PT DPT  3/13/2017

## 2017-03-15 ENCOUNTER — HOSPITAL ENCOUNTER (OUTPATIENT)
Dept: PHYSICAL THERAPY | Facility: HOSPITAL | Age: 52
Setting detail: THERAPIES SERIES
Discharge: HOME | End: 2017-03-15

## 2017-03-15 DIAGNOSIS — M25.552 LEFT HIP PAIN: Primary | ICD-10-CM

## 2017-03-15 DIAGNOSIS — G89.29 CHRONIC BILATERAL LOW BACK PAIN WITHOUT SCIATICA: ICD-10-CM

## 2017-03-15 DIAGNOSIS — R07.81 RIB PAIN ON LEFT SIDE: ICD-10-CM

## 2017-03-15 DIAGNOSIS — M54.50 CHRONIC BILATERAL LOW BACK PAIN WITHOUT SCIATICA: ICD-10-CM

## 2017-03-15 PROCEDURE — 97110 THERAPEUTIC EXERCISES: CPT | Performed by: PHYSICAL THERAPIST

## 2017-03-22 ENCOUNTER — APPOINTMENT (OUTPATIENT)
Dept: PHYSICAL THERAPY | Facility: HOSPITAL | Age: 52
End: 2017-03-22

## 2017-03-24 ENCOUNTER — HOSPITAL ENCOUNTER (OUTPATIENT)
Dept: PHYSICAL THERAPY | Facility: HOSPITAL | Age: 52
Setting detail: THERAPIES SERIES
Discharge: HOME | End: 2017-03-24

## 2017-03-24 DIAGNOSIS — R07.81 RIB PAIN ON LEFT SIDE: ICD-10-CM

## 2017-03-24 DIAGNOSIS — G89.29 CHRONIC BILATERAL LOW BACK PAIN WITHOUT SCIATICA: ICD-10-CM

## 2017-03-24 DIAGNOSIS — M54.50 CHRONIC BILATERAL LOW BACK PAIN WITHOUT SCIATICA: ICD-10-CM

## 2017-03-24 DIAGNOSIS — M25.552 LEFT HIP PAIN: Primary | ICD-10-CM

## 2017-03-24 PROCEDURE — 97140 MANUAL THERAPY 1/> REGIONS: CPT | Performed by: PHYSICAL THERAPIST

## 2017-03-24 PROCEDURE — 97110 THERAPEUTIC EXERCISES: CPT | Performed by: PHYSICAL THERAPIST

## 2017-03-28 ENCOUNTER — APPOINTMENT (OUTPATIENT)
Dept: PREADMISSION TESTING | Facility: HOSPITAL | Age: 52
End: 2017-03-28

## 2017-03-28 VITALS
HEIGHT: 66 IN | HEART RATE: 87 BPM | WEIGHT: 290 LBS | DIASTOLIC BLOOD PRESSURE: 69 MMHG | SYSTOLIC BLOOD PRESSURE: 129 MMHG | BODY MASS INDEX: 46.61 KG/M2 | OXYGEN SATURATION: 96 %

## 2017-03-28 LAB
ANION GAP SERPL CALCULATED.3IONS-SCNC: 13 MMOL/L (ref 4–13)
BUN BLD-MCNC: 13 MG/DL (ref 5–21)
BUN/CREAT SERPL: 20.6 (ref 7–25)
CALCIUM SPEC-SCNC: 9.2 MG/DL (ref 8.4–10.4)
CHLORIDE SERPL-SCNC: 103 MMOL/L (ref 98–110)
CO2 SERPL-SCNC: 26 MMOL/L (ref 24–31)
CREAT BLD-MCNC: 0.63 MG/DL (ref 0.5–1.4)
DEPRECATED RDW RBC AUTO: 40.8 FL (ref 40–54)
ERYTHROCYTE [DISTWIDTH] IN BLOOD BY AUTOMATED COUNT: 13 % (ref 12–15)
GFR SERPL CREATININE-BSD FRML MDRD: 99 ML/MIN/1.73
GLUCOSE BLD-MCNC: 124 MG/DL (ref 70–100)
HCT VFR BLD AUTO: 36.2 % (ref 37–47)
HGB BLD-MCNC: 11.9 G/DL (ref 12–16)
MCH RBC QN AUTO: 29 PG (ref 28–32)
MCHC RBC AUTO-ENTMCNC: 32.9 G/DL (ref 33–36)
MCV RBC AUTO: 88.1 FL (ref 82–98)
PLATELET # BLD AUTO: 289 10*3/MM3 (ref 130–400)
PMV BLD AUTO: 10.7 FL (ref 6–12)
POTASSIUM BLD-SCNC: 4.4 MMOL/L (ref 3.5–5.3)
RBC # BLD AUTO: 4.11 10*6/MM3 (ref 4.2–5.4)
SODIUM BLD-SCNC: 142 MMOL/L (ref 135–145)
WBC NRBC COR # BLD: 4.54 10*3/MM3 (ref 4.8–10.8)

## 2017-03-28 PROCEDURE — 93010 ELECTROCARDIOGRAM REPORT: CPT | Performed by: INTERNAL MEDICINE

## 2017-03-28 PROCEDURE — 80048 BASIC METABOLIC PNL TOTAL CA: CPT

## 2017-03-28 PROCEDURE — 93005 ELECTROCARDIOGRAM TRACING: CPT

## 2017-03-28 PROCEDURE — 85027 COMPLETE CBC AUTOMATED: CPT

## 2017-03-28 PROCEDURE — 36415 COLL VENOUS BLD VENIPUNCTURE: CPT

## 2017-03-28 RX ORDER — MELATONIN
1000 DAILY
COMMUNITY
End: 2017-08-15

## 2017-03-28 RX ORDER — GABAPENTIN 400 MG/1
400 CAPSULE ORAL 2 TIMES DAILY
COMMUNITY
End: 2017-08-15

## 2017-03-28 RX ORDER — AMITRIPTYLINE HYDROCHLORIDE 10 MG/1
10 TABLET, FILM COATED ORAL NIGHTLY
COMMUNITY
End: 2018-11-13

## 2017-03-28 RX ORDER — ROPINIROLE 0.5 MG/1
0.5 TABLET, FILM COATED ORAL NIGHTLY
COMMUNITY
End: 2023-03-07

## 2017-03-28 RX ORDER — HYDROCODONE BITARTRATE AND ACETAMINOPHEN 7.5; 325 MG/1; MG/1
1 TABLET ORAL EVERY 8 HOURS PRN
COMMUNITY
End: 2022-12-06

## 2017-03-28 NOTE — DISCHARGE INSTRUCTIONS
DAY OF SURGERY INSTRUCTIONS        YOUR SURGEON: CARLEY    PROCEDURE: CATARACT EXTRACTION    DATE OF SURGERY: April 4TH    ARRIVAL TIME: AS DIRECTED BY OFFICE    DAY OF SURGERY TAKE ONLY THESE MEDICATIONS: NONE            BEFORE YOU COME TO THE HOSPITAL  (Pre-op instructions)  • Do not eat, drink, smoke or chew gum after midnight the night before surgery.  This also includes no mints.  • Morning of surgery take only the medicines you have been instructed with a sip of water unless otherwise instructed  by your physician.  • Do not shave, wear makeup or dark nail polish.  • Remove all jewelry including rings.  • Leave anything you consider valuable at home.  • Leave your suitcase in the car until after your surgery.  • Bring the following with you if applicable:  o Picture ID and insurance, Medicare or Medicaid cards  o Co-pay/deductible required by insurance (cash, check, credit card)  o Medications (no narcotics) in original bottles (not a list) including all over-the-counter medications.  o Copy of advance directive, living will or power-of- documents if not brought to PAT  o CPAP or BIPAP mask and tubing  o Skin prep instruction sheet  o Relaxation aids (MP3 player, book, magazine)  • Confirm your arrival time with you surgeon the day before your surgery (surgery times are subject to change)  • On the day of surgery check in at registration located at the main entrance of the hospital.       Outpatient Surgery Guidelines, Adult  Outpatient procedures are those for which the person having the procedure is allowed to go home the same day as the procedure. Various procedures are done on an outpatient basis. You should follow some general guidelines if you will be having an outpatient procedure.  LET YOUR HEALTH CARE PROVIDER KNOW ABOUT:  · Any allergies you have.  · All medicines you are taking, including vitamins, herbs, eye drops, creams, and over-the-counter medicines.  · Previous problems you or members  of your family have had with the use of anesthetics.  · Any blood disorders you have.  · Previous surgeries you have had.  · Medical conditions you have.  RISKS AND COMPLICATIONS  Your health care provider will discuss possible risks and complications with you before surgery. Common risks and complications include:    · Problems due to the use of anesthetics.  · Blood loss and replacement (does not apply to minor surgical procedures).  · Temporary increase in pain due to surgery.  · Uncorrected pain or problems that the surgery was meant to correct.  · Infection.  · New damage.  BEFORE THE PROCEDURE  · Ask your health care provider about changing or stopping your regular medicines. You may need to stop taking certain medicines in the days or weeks before the procedure.  · Stop smoking at least 2 weeks before surgery. This lowers your risk for complications during and after surgery. Ask your health care provider for help with this if needed.  · Eat your usual meals and a light supper the day before surgery. Continue fluid intake. Do not drink alcohol.  · Do not eat or drink after midnight the night before your surgery.   · Arrange for someone to take you home and to stay with you for 24 hours after the procedure. Medicine given for your procedure may affect your ability to drive or to care for yourself.  · Call your health care provider's office if you develop an illness or problem that may prevent you from safely having your procedure.  AFTER THE PROCEDURE  After surgery, you will be taken to a recovery area, where your progress will be monitored. If there are no complications, you will be allowed to go home when you are awake, stable, and taking fluids well. You may have numbness around the surgical site. Healing will take some time. You will have tenderness at the surgical site and may have some swelling and bruising. You may also have some nausea.  HOME CARE INSTRUCTIONS  · Do not drive for 24 hours, or as  directed by your health care provider. Do not drive while taking prescription pain medicines.  · Do not drink alcohol for 24 hours.  · Do not make important decisions or sign legal documents for 24 hours.  · You may resume a normal diet and activities as directed.  · Do not lift anything heavier than 10 pounds (4.5 kg) or play contact sports until your health care provider says it is okay.  · Change your bandages (dressings) as directed.  · Only take over-the-counter or prescription medicines as directed by your health care provider.  · Follow up with your health care provider as directed.  SEEK MEDICAL CARE IF:  · You have increased bleeding (more than a small spot) from the surgical site.  · You have redness, swelling, or increasing pain in the wound.  · You see pus coming from the wound.  · You have a fever.  · You notice a bad smell coming from the wound or dressing.  · You feel lightheaded or faint.  · You develop a rash.  · You have trouble breathing.  · You develop allergies.  MAKE SURE YOU:  · Understand these instructions.  · Will watch your condition.  · Will get help right away if you are not doing well or get worse.     This information is not intended to replace advice given to you by your health care provider. Make sure you discuss any questions you have with your health care provider.     Document Released: 09/12/2002 Document Revised: 05/03/2016 Document Reviewed: 05/22/2014  [employer] Interactive Patient Education ©2016 [employer]       Fall Prevention in Hospitals, Adult  As a hospital patient, your condition and the treatments you receive can increase your risk for falls. Some additional risk factors for falls in a hospital include:  · Being in an unfamiliar environment.  · Being on bed rest.  · Your surgery.  · Taking certain medicines.  · Your tubing requirements, such as intravenous (IV) therapy or catheters.  It is important that you learn how to decrease fall risks while at the hospital.  Below are important tips that can help prevent falls.  SAFETY TIPS FOR PREVENTING FALLS  Talk about your risk of falling.  · Ask your health care provider why you are at risk for falling. Is it your medicine, illness, tubing placement, or something else?  · Make a plan with your health care provider to keep you safe from falls.  · Ask your health care provider or pharmacist about side effects of your medicines. Some medicines can make you dizzy or affect your coordination.  Ask for help.  · Ask for help before getting out of bed. You may need to press your call button.  · Ask for assistance in getting safely to the toilet.  · Ask for a walker or cane to be put at your bedside. Ask that most of the side rails on your bed be placed up before your health care provider leaves the room.  · Ask family or friends to sit with you.  · Ask for things that are out of your reach, such as your glasses, hearing aids, telephone, bedside table, or call button.  Follow these tips to avoid falling:  · Stay lying or seated, rather than standing, while waiting for help.  · Wear rubber-soled slippers or shoes whenever you walk in the hospital.  · Avoid quick, sudden movements.  ¨ Change positions slowly.  ¨ Sit on the side of your bed before standing.  ¨ Stand up slowly and wait before you start to walk.  · Let your health care provider know if there is a spill on the floor.  · Pay careful attention to the medical equipment, electrical cords, and tubes around you.  · When you need help, use your call button by your bed or in the bathroom. Wait for one of your health care providers to help you.  · If you feel dizzy or unsure of your footing, return to bed and wait for assistance.  · Avoid being distracted by the TV, telephone, or another person in your room.  · Do not lean or support yourself on rolling objects, such as IV poles or bedside tables.     This information is not intended to replace advice given to you by your health care  provider. Make sure you discuss any questions you have with your health care provider.     Document Released: 12/15/2001 Document Revised: 01/08/2016 Document Reviewed: 08/25/2013  [employer] Interactive Patient Education ©2016 [employer]       Surgical Site Infections FAQs  What is a Surgical Site Infection (SSI)?  A surgical site infection is an infection that occurs after surgery in the part of the body where the surgery took place. Most patients who have surgery do not develop an infection. However, infections develop in about 1 to 3 out of every 100 patients who have surgery.  Some of the common symptoms of a surgical site infection are:  · Redness and pain around the area where you had surgery  · Drainage of cloudy fluid from your surgical wound  · Fever  Can SSIs be treated?  Yes. Most surgical site infections can be treated with antibiotics. The antibiotic given to you depends on the bacteria (germs) causing the infection. Sometimes patients with SSIs also need another surgery to treat the infection.  What are some of the things that hospitals are doing to prevent SSIs?  To prevent SSIs, doctors, nurses, and other healthcare providers:  · Clean their hands and arms up to their elbows with an antiseptic agent just before the surgery.  · Clean their hands with soap and water or an alcohol-based hand rub before and after caring for each patient.  · May remove some of your hair immediately before your surgery using electric clippers if the hair is in the same area where the procedure will occur. They should not shave you with a razor.  · Wear special hair covers, masks, gowns, and gloves during surgery to keep the surgery area clean.  · Give you antibiotics before your surgery starts. In most cases, you should get antibiotics within 60 minutes before the surgery starts and the antibiotics should be stopped within 24 hours after surgery.  · Clean the skin at the site of your surgery with a special soap that  kills germs.  What can I do to help prevent SSIs?  Before your surgery:  · Tell your doctor about other medical problems you may have. Health problems such as allergies, diabetes, and obesity could affect your surgery and your treatment.  · Quit smoking. Patients who smoke get more infections. Talk to your doctor about how you can quit before your surgery.  · Do not shave near where you will have surgery. Shaving with a razor can irritate your skin and make it easier to develop an infection.  At the time of your surgery:  · Speak up if someone tries to shave you with a razor before surgery. Ask why you need to be shaved and talk with your surgeon if you have any concerns.  · Ask if you will get antibiotics before surgery.  After your surgery:  · Make sure that your healthcare providers clean their hands before examining you, either with soap and water or an alcohol-based hand rub.  · If you do not see your providers clean their hands, please ask them to do so.  · Family and friends who visit you should not touch the surgical wound or dressings.  · Family and friends should clean their hands with soap and water or an alcohol-based hand rub before and after visiting you. If you do not see them clean their hands, ask them to clean their hands.  What do I need to do when I go home from the hospital?  · Before you go home, your doctor or nurse should explain everything you need to know about taking care of your wound. Make sure you understand how to care for your wound before you leave the hospital.  · Always clean your hands before and after caring for your wound.  · Before you go home, make sure you know who to contact if you have questions or problems after you get home.  · If you have any symptoms of an infection, such as redness and pain at the surgery site, drainage, or fever, call your doctor immediately.  If you have additional questions, please ask your doctor or nurse.  Developed and co-sponsored by The Society  for Healthcare Epidemiology of [place] (SHEA); Infectious Diseases Society of [place] (IDSA); American Hospital Association; Association for Professionals in Infection Control and Epidemiology (APIC); Centers for Disease Control and Prevention (CDC); and The Joint Commission.     This information is not intended to replace advice given to you by your health care provider. Make sure you discuss any questions you have with your health care provider.     Document Released: 12/23/2014 Document Revised: 01/08/2016 Document Reviewed: 03/02/2016  [employer] Interactive Patient Education ©2016 [employer]     PATIENT/FAMILY/RESPONSIBLE PARTY VERBALIZES UNDERSTANDING OF ABOVE EDUCATION

## 2017-04-03 RX ORDER — CYCLOPENTOLATE HYDROCHLORIDE 10 MG/ML
1 SOLUTION/ DROPS OPHTHALMIC
Status: COMPLETED | OUTPATIENT
Start: 2017-04-04 | End: 2017-04-04

## 2017-04-03 RX ORDER — PHENYLEPHRINE HCL 2.5 %
1 DROPS OPHTHALMIC (EYE)
Status: COMPLETED | OUTPATIENT
Start: 2017-04-04 | End: 2017-04-04

## 2017-04-04 ENCOUNTER — HOSPITAL ENCOUNTER (OUTPATIENT)
Facility: HOSPITAL | Age: 52
Setting detail: HOSPITAL OUTPATIENT SURGERY
Discharge: HOME | End: 2017-04-04

## 2017-04-04 ENCOUNTER — ANESTHESIA (OUTPATIENT)
Dept: PERIOP | Facility: HOSPITAL | Age: 52
End: 2017-04-04

## 2017-04-04 ENCOUNTER — ANESTHESIA EVENT (OUTPATIENT)
Dept: PERIOP | Facility: HOSPITAL | Age: 52
End: 2017-04-04

## 2017-04-04 VITALS
SYSTOLIC BLOOD PRESSURE: 149 MMHG | OXYGEN SATURATION: 95 % | RESPIRATION RATE: 16 BRPM | HEART RATE: 79 BPM | DIASTOLIC BLOOD PRESSURE: 97 MMHG | TEMPERATURE: 97.2 F

## 2017-04-04 LAB
GLUCOSE BLDC GLUCOMTR-MCNC: 131 MG/DL (ref 70–130)
GLUCOSE BLDC GLUCOMTR-MCNC: 141 MG/DL (ref 70–130)

## 2017-04-04 PROCEDURE — C1780 LENS, INTRAOCULAR (NEW TECH): HCPCS

## 2017-04-04 PROCEDURE — 82962 GLUCOSE BLOOD TEST: CPT

## 2017-04-04 PROCEDURE — 25010000002 PROPOFOL 10 MG/ML EMULSION: Performed by: NURSE ANESTHETIST, CERTIFIED REGISTERED

## 2017-04-04 PROCEDURE — 25010000002 MIDAZOLAM PER 1 MG: Performed by: ANESTHESIOLOGY

## 2017-04-04 PROCEDURE — 25010000002 DEXAMETHASONE PER 1 MG: Performed by: ANESTHESIOLOGY

## 2017-04-04 PROCEDURE — 25010000002 DEXAMETHASONE PER 1 MG: Performed by: NURSE ANESTHETIST, CERTIFIED REGISTERED

## 2017-04-04 PROCEDURE — 25010000003 CEFAZOLIN PER 500 MG: Performed by: NURSE ANESTHETIST, CERTIFIED REGISTERED

## 2017-04-04 PROCEDURE — 25010000002 DEXAMETHASONE PER 1 MG

## 2017-04-04 PROCEDURE — 25010000003 CEFAZOLIN PER 500 MG

## 2017-04-04 PROCEDURE — 25010000002 FENTANYL CITRATE (PF) 250 MCG/5ML SOLUTION: Performed by: NURSE ANESTHETIST, CERTIFIED REGISTERED

## 2017-04-04 PROCEDURE — 25010000002 EPINEPHRINE PER 0.1 MG

## 2017-04-04 PROCEDURE — 25010000002 ONDANSETRON PER 1 MG: Performed by: NURSE ANESTHETIST, CERTIFIED REGISTERED

## 2017-04-04 DEVICE — LENS ACRYSOF IQ 6X13MM SN60WF 20.0: Type: IMPLANTABLE DEVICE | Status: FUNCTIONAL

## 2017-04-04 RX ORDER — LIDOCAINE HYDROCHLORIDE 40 MG/ML
SOLUTION TOPICAL AS NEEDED
Status: DISCONTINUED | OUTPATIENT
Start: 2017-04-04 | End: 2017-04-04 | Stop reason: SURG

## 2017-04-04 RX ORDER — CELECOXIB 200 MG/1
200 CAPSULE ORAL ONCE AS NEEDED
Status: DISCONTINUED | OUTPATIENT
Start: 2017-04-04 | End: 2017-04-04 | Stop reason: HOSPADM

## 2017-04-04 RX ORDER — SODIUM CHLORIDE, SODIUM LACTATE, POTASSIUM CHLORIDE, CALCIUM CHLORIDE 600; 310; 30; 20 MG/100ML; MG/100ML; MG/100ML; MG/100ML
9 INJECTION, SOLUTION INTRAVENOUS CONTINUOUS
Status: DISCONTINUED | OUTPATIENT
Start: 2017-04-04 | End: 2017-04-04 | Stop reason: HOSPADM

## 2017-04-04 RX ORDER — MORPHINE SULFATE 2 MG/ML
2 INJECTION, SOLUTION INTRAMUSCULAR; INTRAVENOUS
Status: DISCONTINUED | OUTPATIENT
Start: 2017-04-04 | End: 2017-04-04 | Stop reason: HOSPADM

## 2017-04-04 RX ORDER — DIPHENHYDRAMINE HYDROCHLORIDE 50 MG/ML
12.5 INJECTION INTRAMUSCULAR; INTRAVENOUS
Status: DISCONTINUED | OUTPATIENT
Start: 2017-04-04 | End: 2017-04-04 | Stop reason: HOSPADM

## 2017-04-04 RX ORDER — CEFAZOLIN SODIUM 1 G/3ML
INJECTION, POWDER, FOR SOLUTION INTRAMUSCULAR; INTRAVENOUS AS NEEDED
Status: DISCONTINUED | OUTPATIENT
Start: 2017-04-04 | End: 2017-04-04 | Stop reason: HOSPADM

## 2017-04-04 RX ORDER — CEFAZOLIN SODIUM 1 G/3ML
INJECTION, POWDER, FOR SOLUTION INTRAMUSCULAR; INTRAVENOUS AS NEEDED
Status: DISCONTINUED | OUTPATIENT
Start: 2017-04-04 | End: 2017-04-04 | Stop reason: SURG

## 2017-04-04 RX ORDER — PROMETHAZINE HYDROCHLORIDE 25 MG/ML
12.5 INJECTION, SOLUTION INTRAMUSCULAR; INTRAVENOUS EVERY 6 HOURS PRN
Status: DISCONTINUED | OUTPATIENT
Start: 2017-04-04 | End: 2017-04-04 | Stop reason: HOSPADM

## 2017-04-04 RX ORDER — MEPERIDINE HYDROCHLORIDE 25 MG/ML
12.5 INJECTION INTRAMUSCULAR; INTRAVENOUS; SUBCUTANEOUS
Status: DISCONTINUED | OUTPATIENT
Start: 2017-04-04 | End: 2017-04-04 | Stop reason: HOSPADM

## 2017-04-04 RX ORDER — MIDAZOLAM HYDROCHLORIDE 1 MG/ML
1 INJECTION INTRAMUSCULAR; INTRAVENOUS
Status: DISCONTINUED | OUTPATIENT
Start: 2017-04-04 | End: 2017-04-04 | Stop reason: HOSPADM

## 2017-04-04 RX ORDER — NALOXONE HCL 0.4 MG/ML
0.4 VIAL (ML) INJECTION AS NEEDED
Status: DISCONTINUED | OUTPATIENT
Start: 2017-04-04 | End: 2017-04-04 | Stop reason: HOSPADM

## 2017-04-04 RX ORDER — DEXTROSE MONOHYDRATE 25 G/50ML
12.5 INJECTION, SOLUTION INTRAVENOUS AS NEEDED
Status: DISCONTINUED | OUTPATIENT
Start: 2017-04-04 | End: 2017-04-04 | Stop reason: HOSPADM

## 2017-04-04 RX ORDER — FAMOTIDINE 10 MG/ML
20 INJECTION, SOLUTION INTRAVENOUS
Status: DISCONTINUED | OUTPATIENT
Start: 2017-04-04 | End: 2017-04-04 | Stop reason: HOSPADM

## 2017-04-04 RX ORDER — FENTANYL CITRATE 50 UG/ML
25 INJECTION, SOLUTION INTRAMUSCULAR; INTRAVENOUS
Status: DISCONTINUED | OUTPATIENT
Start: 2017-04-04 | End: 2017-04-04 | Stop reason: HOSPADM

## 2017-04-04 RX ORDER — BUPIVACAINE HYDROCHLORIDE 7.5 MG/ML
INJECTION, SOLUTION EPIDURAL; RETROBULBAR AS NEEDED
Status: DISCONTINUED | OUTPATIENT
Start: 2017-04-04 | End: 2017-04-04 | Stop reason: HOSPADM

## 2017-04-04 RX ORDER — DEXAMETHASONE SODIUM PHOSPHATE 4 MG/ML
4 INJECTION, SOLUTION INTRA-ARTICULAR; INTRALESIONAL; INTRAMUSCULAR; INTRAVENOUS; SOFT TISSUE ONCE AS NEEDED
Status: COMPLETED | OUTPATIENT
Start: 2017-04-04 | End: 2017-04-04

## 2017-04-04 RX ORDER — MIDAZOLAM HYDROCHLORIDE 1 MG/ML
2 INJECTION INTRAMUSCULAR; INTRAVENOUS
Status: DISCONTINUED | OUTPATIENT
Start: 2017-04-04 | End: 2017-04-04 | Stop reason: HOSPADM

## 2017-04-04 RX ORDER — ONDANSETRON 2 MG/ML
4 INJECTION INTRAMUSCULAR; INTRAVENOUS ONCE AS NEEDED
Status: DISCONTINUED | OUTPATIENT
Start: 2017-04-04 | End: 2017-04-04 | Stop reason: HOSPADM

## 2017-04-04 RX ORDER — OXYCODONE HYDROCHLORIDE AND ACETAMINOPHEN 5; 325 MG/1; MG/1
1 TABLET ORAL EVERY 4 HOURS PRN
Status: DISCONTINUED | OUTPATIENT
Start: 2017-04-04 | End: 2017-04-04 | Stop reason: HOSPADM

## 2017-04-04 RX ORDER — PILOCARPINE HYDROCHLORIDE 20 MG/ML
SOLUTION/ DROPS OPHTHALMIC AS NEEDED
Status: DISCONTINUED | OUTPATIENT
Start: 2017-04-04 | End: 2017-04-04 | Stop reason: HOSPADM

## 2017-04-04 RX ORDER — HYDRALAZINE HYDROCHLORIDE 20 MG/ML
5 INJECTION INTRAMUSCULAR; INTRAVENOUS
Status: DISCONTINUED | OUTPATIENT
Start: 2017-04-04 | End: 2017-04-04 | Stop reason: HOSPADM

## 2017-04-04 RX ORDER — LIDOCAINE HYDROCHLORIDE 20 MG/ML
INJECTION, SOLUTION INFILTRATION; PERINEURAL AS NEEDED
Status: DISCONTINUED | OUTPATIENT
Start: 2017-04-04 | End: 2017-04-04 | Stop reason: SURG

## 2017-04-04 RX ORDER — SODIUM CHLORIDE 0.9 % (FLUSH) 0.9 %
1-10 SYRINGE (ML) INJECTION AS NEEDED
Status: DISCONTINUED | OUTPATIENT
Start: 2017-04-04 | End: 2017-04-04 | Stop reason: HOSPADM

## 2017-04-04 RX ORDER — MAGNESIUM HYDROXIDE 1200 MG/15ML
LIQUID ORAL AS NEEDED
Status: DISCONTINUED | OUTPATIENT
Start: 2017-04-04 | End: 2017-04-04 | Stop reason: HOSPADM

## 2017-04-04 RX ORDER — PROPOFOL 10 MG/ML
VIAL (ML) INTRAVENOUS AS NEEDED
Status: DISCONTINUED | OUTPATIENT
Start: 2017-04-04 | End: 2017-04-04 | Stop reason: SURG

## 2017-04-04 RX ORDER — FENTANYL CITRATE 50 UG/ML
INJECTION, SOLUTION INTRAMUSCULAR; INTRAVENOUS AS NEEDED
Status: DISCONTINUED | OUTPATIENT
Start: 2017-04-04 | End: 2017-04-04 | Stop reason: SURG

## 2017-04-04 RX ORDER — DEXAMETHASONE SODIUM PHOSPHATE 4 MG/ML
INJECTION, SOLUTION INTRA-ARTICULAR; INTRALESIONAL; INTRAMUSCULAR; INTRAVENOUS; SOFT TISSUE AS NEEDED
Status: DISCONTINUED | OUTPATIENT
Start: 2017-04-04 | End: 2017-04-04 | Stop reason: HOSPADM

## 2017-04-04 RX ORDER — ROCURONIUM BROMIDE 10 MG/ML
INJECTION, SOLUTION INTRAVENOUS AS NEEDED
Status: DISCONTINUED | OUTPATIENT
Start: 2017-04-04 | End: 2017-04-04 | Stop reason: SURG

## 2017-04-04 RX ORDER — ONDANSETRON 2 MG/ML
INJECTION INTRAMUSCULAR; INTRAVENOUS AS NEEDED
Status: DISCONTINUED | OUTPATIENT
Start: 2017-04-04 | End: 2017-04-04 | Stop reason: SURG

## 2017-04-04 RX ORDER — DEXAMETHASONE SODIUM PHOSPHATE 4 MG/ML
INJECTION, SOLUTION INTRA-ARTICULAR; INTRALESIONAL; INTRAMUSCULAR; INTRAVENOUS; SOFT TISSUE AS NEEDED
Status: DISCONTINUED | OUTPATIENT
Start: 2017-04-04 | End: 2017-04-04 | Stop reason: SURG

## 2017-04-04 RX ORDER — IPRATROPIUM BROMIDE AND ALBUTEROL SULFATE 2.5; .5 MG/3ML; MG/3ML
3 SOLUTION RESPIRATORY (INHALATION) ONCE AS NEEDED
Status: DISCONTINUED | OUTPATIENT
Start: 2017-04-04 | End: 2017-04-04 | Stop reason: HOSPADM

## 2017-04-04 RX ORDER — LABETALOL HYDROCHLORIDE 5 MG/ML
5 INJECTION, SOLUTION INTRAVENOUS
Status: DISCONTINUED | OUTPATIENT
Start: 2017-04-04 | End: 2017-04-04 | Stop reason: HOSPADM

## 2017-04-04 RX ADMIN — ONDANSETRON HYDROCHLORIDE 8 MG: 2 SOLUTION INTRAMUSCULAR; INTRAVENOUS at 08:12

## 2017-04-04 RX ADMIN — FAMOTIDINE 20 MG: 10 INJECTION, SOLUTION INTRAVENOUS at 06:54

## 2017-04-04 RX ADMIN — ROCURONIUM BROMIDE 20 MG: 10 INJECTION INTRAVENOUS at 08:00

## 2017-04-04 RX ADMIN — LIDOCAINE HYDROCHLORIDE 1 EACH: 40 SOLUTION TOPICAL at 08:00

## 2017-04-04 RX ADMIN — MIDAZOLAM HYDROCHLORIDE 2 MG: 1 INJECTION, SOLUTION INTRAMUSCULAR; INTRAVENOUS at 06:54

## 2017-04-04 RX ADMIN — CYCLOPENTOLATE HYDROCHLORIDE 1 DROP: 10 SOLUTION/ DROPS OPHTHALMIC at 07:06

## 2017-04-04 RX ADMIN — LIDOCAINE HYDROCHLORIDE 0.5 ML: 10 INJECTION, SOLUTION EPIDURAL; INFILTRATION; INTRACAUDAL; PERINEURAL at 06:54

## 2017-04-04 RX ADMIN — DEXAMETHASONE SODIUM PHOSPHATE 8 MG: 4 INJECTION, SOLUTION INTRAMUSCULAR; INTRAVENOUS at 08:12

## 2017-04-04 RX ADMIN — PHENYLEPHRINE HYDROCHLORIDE 1 DROP: 25 SOLUTION/ DROPS OPHTHALMIC at 07:06

## 2017-04-04 RX ADMIN — CEFAZOLIN 1 G: 1 INJECTION, POWDER, FOR SOLUTION INTRAVENOUS at 08:00

## 2017-04-04 RX ADMIN — PHENYLEPHRINE HYDROCHLORIDE 1 DROP: 25 SOLUTION/ DROPS OPHTHALMIC at 06:54

## 2017-04-04 RX ADMIN — CYCLOPENTOLATE HYDROCHLORIDE 1 DROP: 10 SOLUTION/ DROPS OPHTHALMIC at 06:54

## 2017-04-04 RX ADMIN — SODIUM CHLORIDE, POTASSIUM CHLORIDE, SODIUM LACTATE AND CALCIUM CHLORIDE 9 ML/HR: 600; 310; 30; 20 INJECTION, SOLUTION INTRAVENOUS at 06:54

## 2017-04-04 RX ADMIN — DEXAMETHASONE SODIUM PHOSPHATE 4 MG: 4 INJECTION, SOLUTION INTRAMUSCULAR; INTRAVENOUS at 06:54

## 2017-04-04 RX ADMIN — LIDOCAINE HYDROCHLORIDE 100 MG: 20 INJECTION, SOLUTION INFILTRATION; PERINEURAL at 08:00

## 2017-04-04 RX ADMIN — CYCLOPENTOLATE HYDROCHLORIDE 1 DROP: 10 SOLUTION/ DROPS OPHTHALMIC at 07:15

## 2017-04-04 RX ADMIN — PROPOFOL 150 MG: 10 INJECTION, EMULSION INTRAVENOUS at 08:00

## 2017-04-04 RX ADMIN — FENTANYL CITRATE 250 MCG: 50 INJECTION INTRAMUSCULAR; INTRAVENOUS at 08:00

## 2017-04-04 RX ADMIN — PHENYLEPHRINE HYDROCHLORIDE 1 DROP: 25 SOLUTION/ DROPS OPHTHALMIC at 07:15

## 2017-04-04 NOTE — DISCHARGE INSTRUCTIONS

## 2017-04-04 NOTE — BRIEF OP NOTE
CATARACT PHACO EXTRACTION WITH INTRAOCULAR LENS IMPLANT  Procedure Note    Shirley Colby  4/4/2017    Pre-op Diagnosis:   NUCLEAR SCLEROTIC CATARACT, RIGHT    Post-op Diagnosis:     * No Diagnosis Codes entered *    Procedure/CPT® Codes:      Procedure(s):  CATARACT PHACO EXTRACTION WITH INTRAOCULAR LENS IMPLANT, RIGHT    Surgeon(s):  Huan Lynn MD    Anesthesia: General    Staff:   Circulator: Kapil Sellers RN  Scrub Person: Nuria Wesley  Assistant: Desean Payton    Estimated Blood Loss: * No values recorded between 4/4/2017  7:56 AM and 4/4/2017  8:53 AM *  Urine Voided: * No values recorded between 4/4/2017  7:56 AM and 4/4/2017  8:53 AM *    Specimens:                * No specimens in log *      Drains:           Findings: as above    Complications: none      Huan Lynn MD     Date: 4/4/2017  Time: 8:57 AM

## 2017-04-04 NOTE — ANESTHESIA POSTPROCEDURE EVALUATION
Patient: Shirley Colby    Procedure Summary     Date Anesthesia Start Anesthesia Stop Room / Location    04/04/17 0756 0853  [facility] OR 03 / [facility]       Procedure Diagnosis Surgeon Provider    CATARACT PHACO EXTRACTION WITH INTRAOCULAR LENS IMPLANT, RIGHT (Right Eye) (NUCLEAR SCLEROTIC CATARACT, RIGHT) MD Junior Matias CRNA          Anesthesia Type: general  Last vitals  /87 (04/04/17 0909)    Temp 97 °F (36.1 °C) (04/04/17 0854)    Pulse 105 (04/04/17 0909)   Resp 14 (04/04/17 0909)    SpO2 98 % (04/04/17 0909)      Post Anesthesia Care and Evaluation    Patient location during evaluation: PACU  Patient participation: complete - patient participated  Level of consciousness: awake  Pain management: adequate  Airway patency: patent  Anesthetic complications: No anesthetic complications  PONV Status: none  Cardiovascular status: acceptable  Respiratory status: acceptable  Hydration status: acceptable

## 2017-04-04 NOTE — H&P
Patient History and Physical Findings    Chief complaint   Decreased vision right eye    Subjective     Patient is a 51 y.o. female presents with decreased vision secondary to cataract right eye.      Review of Systems   All systems were reviewed and negative except for:  Pertinent items are noted in HPI    History  Past Medical History:   Diagnosis Date   • Blurred vision    • Chronic back pain    • Colon polyp    • COPD (chronic obstructive pulmonary disease)    • Fatty liver    • GERD (gastroesophageal reflux disease)    • Hypercholesterolemia    • Hyperlipidemia    • Hypertension    • Insomnia    • Neuropathy     BILAT FEET   • On home oxygen therapy     OXYGEN AT 2L PER NC AT HS   • Parkinson disease    • Restless leg    • Seizures      Past Surgical History:   Procedure Laterality Date   • BREAST BIOPSY      right breast   • EYE SURGERY      TOOK FILM OFF EYE   • HYSTERECTOMY       History reviewed. No pertinent family history.  Social History   Substance Use Topics   • Smoking status: Former Smoker     Quit date: 12/14/2007   • Smokeless tobacco: Never Used   • Alcohol use No     Prescriptions Prior to Admission   Medication Sig Dispense Refill Last Dose   • albuterol (VENTOLIN HFA) 108 (90 BASE) MCG/ACT inhaler Inhale 2 puffs Every 6 (Six) Hours As Needed for Shortness of Air.   3/31/2017   • amitriptyline (ELAVIL) 10 MG tablet Take 10 mg by mouth Every Night.   4/3/2017 at 2000   • Calcium Carb-Cholecalciferol (CALCIUM + D3 PO) Take 600 mg by mouth 2 (Two) Times a Day.   4/3/2017 at 2000   • cholecalciferol (VITAMIN D3) 1000 UNITS tablet Take 1,000 Units by mouth 2 (Two) Times a Day.   4/3/2017 at 2000   • diazePAM (VALIUM) 10 MG tablet Take 5 mg by mouth daily.   4/3/2017 at 1000   • gabapentin (NEURONTIN) 400 MG capsule Take 400 mg by mouth 2 (Two) Times a Day.   4/3/2017 at 2000   • gemfibrozil (LOPID) 600 MG tablet Take 600 mg by mouth daily.   4/3/2017 at 1000   • HYDROcodone-acetaminophen (NORCO)  7.5-325 MG per tablet Take 1 tablet by mouth Every 8 (Eight) Hours As Needed for Moderate Pain (4-6) (BACK PAIN).   4/3/2017 at 2000   • lisinopril (PRINIVIL,ZESTRIL) 20 MG tablet TAKE 1 PILL DAILY   4/3/2017 at 1000   • naproxen (NAPROSYN) 500 MG tablet Take 1 tablet by mouth 2 (Two) Times a Day As Needed for mild pain (1-3). (Patient taking differently: Take 500 mg by mouth 2 (Two) Times a Day With Meals.) 20 tablet 0 3/26/2017   • omeprazole (priLOSEC) 40 MG capsule Take 40 mg by mouth daily.   4/3/2017 at 1000   • rOPINIRole (REQUIP) 0.5 MG tablet Take 0.5 mg by mouth Every Night. Take 1 hour before bedtime.   4/3/2017 at 2000   • vitamin B-12 (CYANOCOBALAMIN) 1000 MCG tablet Take 1,000 mcg by mouth daily.   4/3/2017 at 1000   • vitamin E 100 UNIT capsule Take 1,000 Units by mouth daily. 2 pills daily   4/3/2017 at 1000     Allergies:  Review of patient's allergies indicates no known allergies.    Current Facility-Administered Medications   Medication Dose Route Frequency Provider Last Rate Last Dose   • buffered lidocaine syringe 0.5 mL  0.5 mL Injection PRN Dwight Donahue MD   0.5 mL at 04/04/17 0654   • ceFAZolin (ANCEF) 1 g/100 mL 0.9% NS IVPB (mbp)  1 g Intravenous Once Huan Lynn MD       • dextrose (D50W) solution 12.5 g  12.5 g Intravenous PRN Dwight Donahue MD       • famotidine (PEPCID) injection 20 mg  20 mg Intravenous 60 Min Pre-Op Dwight Donahue MD   20 mg at 04/04/17 0654   • FentaNYL Citrate (PF) (SUBLIMAZE) injection 25 mcg  25 mcg Intravenous Q5 Min PRN Dwight Donahue MD       • lactated ringers infusion  9 mL/hr Intravenous Continuous Dwight Donahue MD 9 mL/hr at 04/04/17 0654 9 mL/hr at 04/04/17 0654   • midazolam (VERSED) injection 1 mg  1 mg Intravenous Q5 Min PRN Dwight Donahue MD        Or   • midazolam (VERSED) injection 2 mg  2 mg Intravenous Q5 Min PRN Dwight Donahue MD   2 mg at 04/04/17 0654   • sodium  chloride 0.9 % flush 1-10 mL  1-10 mL Intravenous PRN Dwight Donahue MD           Objective     Vital Signs  Temp:  [98.2 °F (36.8 °C)] 98.2 °F (36.8 °C)  Heart Rate:  [79-85] 79  Resp:  [16] 16  BP: (132)/(77) 132/77    Physical Exam:      General Appearance:    Alert, cooperative, in no acute distress   Head:    Normocephalic, without obvious abnormality, atraumatic   Eyes:            Lids and lashes normal, conjunctivae and sclerae normal, no   icterus, no pallor, corneas clear, PERRLA   Ears:    Ears appear intact with no abnormalities noted   Neck:   No adenopathy, supple, trachea midline, no thyromegaly, no   carotid bruit, no JVD   Lungs:     Clear to auscultation,respirations regular, even and                  unlabored    Heart:    Regular rhythm and normal rate, normal S1 and S2, no            murmur, no gallop, no rub, no click   Chest Wall:    No abnormalities observed   Abdomen:     Normal bowel sounds, soft, non-tender, non-distended, no guarding   Extremities:   Moves all extremities well, no edema, no cyanosis, no             redness   Pulses:   Normal radial   Skin:   No bleeding, bruising or rash   Neurologic:   Awake, alert, oriented x 3       Assessment/Plan     Cataract right eye    Phacoemulsification cataract extraction with intraocular lens implant right eye      Active Problems:    * No active hospital problems. *           Huan Lynn MD  04/04/17  7:47 AM

## 2017-04-04 NOTE — OP NOTE
DATE OF PROCEDURE: 04/04/2017    SURGEON:  Huan Lynn MD    ANESTHESIA: General.     PROCEDURE  PERFORMED:  Phacoemulsification cataract extraction with intraocular lens implant, right eye.     PREOPERATIVE DIAGNOSIS: Cataract, right eye.     POSTOPERATIVE DIAGNOSIS: Cataract, right eye.     INDICATIONS FOR PROCEDURE:  Decreased vision secondary to cataract with vision decreased to below 20/50 best corrected without glare right eye.     DESCRIPTION OF PROCEDURE: Having determined the patient medically sound to undergo the procedure, she was brought to the operating room and placed on the operating table in the supine position. Under the direction of the anesthesia staff, she was induced with general anesthesia without complication. The area about the right eye was prepped and draped in the usual sterile fashion. Lid retractor was inserted to expose the globe for the procedure. Vitrector was inserted to expose the globe for the procedure. A paracentesis site was created at the 2 oclock position through which the anterior chamber was inflated with viscoelastic. Following this, a clear corneal incision was created at the 10 oclock position through which an anterior capsulotomy was performed and the anterior capsule removed. Hydrodissection and hydrodelineation were then carried out without difficulty. The phacoemulsification handpiece was introduced to remove the central nucleus followed by the BERNY handpiece to remove the remaining peripheral cortex and polish the posterior capsule. This left the capsule clear and intact. An intraocular lens implant of appropriate style and power was then implanted in the posterior chamber and rotated into position with haptics horizontal lens centering well. The viscoelastic was then irrigated from the chamber. _____  is hydrated. Left the chamber well maintained with a well centered intraocular lens implant and near-normal finger tension intraocular pressure. A small amount of  antibiotic and Decadron was then injected subconjunctivally inferiorly. The eye was then dressed with Carpine drops as well as TobraDex ointment and a protective pad and shield were placed. The patient was then being recovered by anesthesia, having suffered no untoward effect from the procedure or anesthesia at the time of this dictation.     cc:         Huan Lynn M.D.  [id number]  D:  04/04/2017 09:55:51(Eastern Time)  T:  04/04/2017 12:43:02(Eastern Time)  Voice ID:  [id number]/Document ID:  [id number]

## 2017-04-04 NOTE — ANESTHESIA PREPROCEDURE EVALUATION
Anesthesia Evaluation     Patient summary reviewed   no history of anesthetic complications:  NPO Status: > 8 hours   Airway   Mallampati: II  TM distance: >3 FB  Neck ROM: full  Dental    (+) lower dentures and upper dentures    Pulmonary    (+) COPD (home O2 prn),   (-) sleep apnea, not a smoker  Cardiovascular   Exercise tolerance: good (4-7 METS)    ECG reviewed    (+) hypertension,   (-) pacemaker, past MI, angina, cardiac stents      Neuro/Psych  (+) seizures (last 2 years ago),    (-) CVA  GI/Hepatic/Renal/Endo    (+) morbid obesity, GERD,   (-) liver disease, renal disease, diabetes    Musculoskeletal     Abdominal    Substance History      OB/GYN          Other                                    Anesthesia Plan    ASA 3     general     intravenous induction   Anesthetic plan and risks discussed with patient.

## 2017-04-04 NOTE — ANESTHESIA PROCEDURE NOTES
Airway  Airway not difficult    General Information and Staff    Patient location during procedure: OR  CRNA: PERNELL HULL    Indications and Patient Condition  Indications for airway management: airway protection    Preoxygenated: yes  MILS maintained throughout  Mask difficulty assessment: 1 - vent by mask    Final Airway Details  Final airway type: endotracheal airway      Successful airway: ETT  Cuffed: yes   Successful intubation technique: direct laryngoscopy  Facilitating devices/methods: intubating stylet  Endotracheal tube insertion site: oral  Blade: Armin  Blade size: #3  ETT size: 7.0 mm  Cormack-Lehane Classification: grade I - full view of glottis  Placement verified by: chest auscultation and capnometry   Cuff volume (mL): 7  Measured from: lips  ETT to lips (cm): 21  Number of attempts at approach: 1

## 2017-04-04 NOTE — PLAN OF CARE
Problem: Patient Care Overview (Adult)  Goal: Plan of Care Review  Outcome: Outcome(s) achieved Date Met:  04/04/17 04/04/17 1035   Coping/Psychosocial Response Interventions   Plan Of Care Reviewed With patient;spouse   Patient Care Overview   Progress improving   Outcome Evaluation   Outcome Summary/Follow up Plan D/C CRITERIA MET, PT. READY FOR D/C

## 2017-04-10 ENCOUNTER — DOCUMENTATION (OUTPATIENT)
Dept: PHYSICAL THERAPY | Facility: HOSPITAL | Age: 52
End: 2017-04-10

## 2017-04-10 DIAGNOSIS — M25.552 LEFT HIP PAIN: Primary | ICD-10-CM

## 2017-04-10 DIAGNOSIS — R07.81 RIB PAIN ON LEFT SIDE: ICD-10-CM

## 2017-04-10 NOTE — THERAPY DISCHARGE NOTE
Outpatient Physical Therapy Discharge Summary         Patient Name: Shirley Colby  : 1965  MRN: [id number]    Today's Date: 4/10/2017    Visit Dx:    ICD-10-CM ICD-9-CM   1. Left hip pain M25.552 719.45   2. Rib pain on left side R07.81 786.50             PT OP Goals       04/10/17 1321       PT Short Term Goals    STG Date to Achieve 17  -KR     STG 1 Pt will report pain no greater than 4-5/10 with all daily activities.   -KR     STG 1 Progress Not Met  -KR     STG 2 Pt will score 50% or less on Oswestry.   -KR     STG 2 Progress Not Met  -KR     Long Term Goals    LTG Date to Achieve 17  -KR     LTG 1 Pt will demontrate cervical rotation 65 degrees or greater.   -KR     LTG 1 Progress Not Met  -KR     LTG 2 Pt will demonstrated throacic rotation 50% or greater.   -KR     LTG 2 Progress Not Met  -KR     LTG 3 Pt will perform tandem stance for 20 seconds or greater.   -KR     LTG 3 Progress Met  -KR     LTG 4 Pt will report being able to complete hair hygene activities without increase in pain.   -KR     LTG 4 Progress Met  -KR     LTG 5 Pt will core 30% or less on Oswestry.   -KR     LTG 5 Progress Not Met  -KR       User Key  (r) = Recorded By, (t) = Taken By, (c) = Cosigned By    Initials Name Provider Type    GASTON Chirinos, PT DPT Physical Therapist          OP PT Discharge Summary  Date of Discharge: 04/10/17  Reason for Discharge: Unable to participate, other (comment) (see discharge instructions)  Outcomes Achieved: Patient able to partially acheive established goals  Discharge Destination: Home with home program  Discharge Instructions: Again, the patient is non compliant with her home program. We are limited with manual techniques and positions seconary to patient tolerance and increased adipose tissue. She is due to have eye surgery soon and has not made anymore appointments with us. I informed her we could only kep her chart open 30 days, then would have to discharge and  she would need a new referral for further treatment. Her attendance with therapy has been inconsistent as well, this is only her 8th treatment session she has attended since her initial evaluation on February 1, her plan of care being 2 times per week. She does want to continue therapy, but her compliance with HEP and attendance is crucial to making improvements. She was more limited with cervical rotaiton today, but I could not fully asess secondary to patient tolerance. We have conitnued to educate on fear avoidance patterns and pain vs. soreness. She is still having anterior pelvic tilt with rounded shoulder and hinged lower cervical spine.      Time Calculation:                    Jessica Chirinos, PT DPT  4/10/2017

## 2017-06-07 ENCOUNTER — TRANSCRIBE ORDERS (OUTPATIENT)
Dept: PHYSICAL THERAPY | Facility: HOSPITAL | Age: 52
End: 2017-06-07

## 2017-06-07 DIAGNOSIS — M54.9 BACKACHE, UNSPECIFIED: Primary | ICD-10-CM

## 2017-06-12 ENCOUNTER — HOSPITAL ENCOUNTER (OUTPATIENT)
Dept: PHYSICAL THERAPY | Facility: HOSPITAL | Age: 52
Setting detail: THERAPIES SERIES
Discharge: HOME | End: 2017-06-12

## 2017-06-12 DIAGNOSIS — R07.81 RIB PAIN ON LEFT SIDE: ICD-10-CM

## 2017-06-12 DIAGNOSIS — M54.2 NECK PAIN: Primary | ICD-10-CM

## 2017-06-12 PROCEDURE — 97162 PT EVAL MOD COMPLEX 30 MIN: CPT

## 2017-06-13 ENCOUNTER — APPOINTMENT (OUTPATIENT)
Dept: PHYSICAL THERAPY | Facility: HOSPITAL | Age: 52
End: 2017-06-13

## 2017-06-13 NOTE — THERAPY EVALUATION
Outpatient Physical Therapy Ortho Initial Evaluation  [facility]     Patient Name: Shirley Colby  : 1965  MRN: [id number]  Today's Date: 2017      Visit Date: 2017    There is no problem list on file for this patient.       Past Medical History:   Diagnosis Date   • Blurred vision    • Chronic back pain    • Colon polyp    • COPD (chronic obstructive pulmonary disease)    • Fatty liver    • GERD (gastroesophageal reflux disease)    • Hypercholesterolemia    • Hyperlipidemia    • Hypertension    • Insomnia    • Neuropathy     BILAT FEET   • On home oxygen therapy     OXYGEN AT 2L PER NC AT HS   • Parkinson disease    • Restless leg    • Seizures         Past Surgical History:   Procedure Laterality Date   • BREAST BIOPSY      right breast   • CATARACT EXTRACTION W/ INTRAOCULAR LENS IMPLANT Right 2017    Procedure: CATARACT PHACO EXTRACTION WITH INTRAOCULAR LENS IMPLANT, RIGHT;  Surgeon: Huan Lynn MD;  Location: [facility];  Service:    • EYE SURGERY      TOOK FILM OFF EYE   • HYSTERECTOMY         Visit Dx:     ICD-10-CM ICD-9-CM   1. Neck pain M54.2 723.1   2. Rib pain on left side R07.81 786.50             Patient History       17 1500          History    Chief Complaint Pain  -RS      Type of Pain Rib pain;Hip pain;Back pain   all L side; brusing under L breast  -RS      Date Current Problem(s) Began 10/10/16  -RS      Brief Description of Current Complaint Patient has been treated in our clinic prior for similar issues.  Patient has now had eye surgery and is now ready to resume therapy on a formal basis.  Pain is located to the left rib cage and left sided neck pain.  The patient has not had any new images or compliants.  The patient states that the pain is worse with motion and improves with resting/pain medication.  She is concerned because the pain has been going on for quite some time.  Patient is now on her diabetes medication again.  Also reported chest pain that occurs  with activity.  Patient is overall concerned about losing weight to manage her diabetes.    -RS      Onset Date- PT 06/12/2017  -RS      Patient/Caregiver Goals Relieve pain;Return to prior level of function  -RS      Current Tobacco Use no  -RS      Patient's Rating of General Health Fair  -RS      Hand Dominance left-handed  -RS      Occupation/sports/leisure activities unemployed  -RS      How has patient tried to help current problem? heating blanket, medications (under care of pain management for prior back issues)  -RS      What clinical tests have you had for this problem? X-ray   CT, MRI  (Dr. Roche)  -RS      Results of Clinical Tests thoracic negative; cervical results unknown  -RS      History of Previous Related Injuries Prior treatment for the same condition 1-2 months ago.  -RS      Are you or can you be pregnant No  -RS      Pain     Pain Location Neck;Rib cage   left sided  -RS      Pain at Present 4  -RS      Pain at Best 4  -RS      Pain at Worst 6  -RS      Pain Frequency Constant/continuous  -RS      Pain Description Aching;Sore   Catching described in the mid back/ribs  -RS      Fall Risk Assessment    Any falls in the past year: No  -RS      Daily Activities    Primary Language English  -RS      Are you able to read Yes  -RS      Are you able to write Yes  -RS      Teaching needs identified Home Exercise Program;Management of Condition  -RS      Does patient have problems with the following? Anxiety;Panic Attack  -RS      Pt Participated in POC and Goals Yes  -RS      Safety    Are you being hurt, hit, or frightened by anyone at home or in your life? No  -RS      Are you being neglected by a caregiver No  -RS        User Key  (r) = Recorded By, (t) = Taken By, (c) = Cosigned By    Initials Name Provider Type    RS Surendra Carter, PT DPT Physical Therapist                PT Ortho       06/12/17 1500    Posture/Observations    Cervical Lordosis Decreased;Sitting posture  -RS     Thoracic Kyphosis Moderate;Increased;Sitting posture;Standing posture  -RS    Rounded Shoulders Bilateral:;Moderate;Increased  -RS    Posture/Observations Comments Patient demonstrates kypholordotic posture   -RS    Sensation    Sensation WNL? WNL  -RS    Quarter Clearing    Quarter Clearing Upper Quarter Clearing  -RS    DTR- Upper Quarter Clearing    Biceps (C5/6) Bilateral:;2- Normal response  -RS    Brachioradialis (C6) Bilateral:;2- Normal response  -RS    Triceps (C7) Bilateral:;2- Normal response  -RS    Sensory Screen for Light Touch- Upper Quarter Clearing    C4 (posterior shoulder) Bilateral:;Intact  -RS    C5 (lateral upper arm) Bilateral:;Intact  -RS    C6 (tip of thumb) Bilateral:;Intact  -RS    C7 (tip of 3rd finger) Bilateral:;Intact  -RS    C8 (tip of 5th finger) Bilateral:;Intact  -RS    T1 (medial lower arm) Bilateral:;Intact  -RS    Myotomal Screen- Upper Quarter Clearing    Shoulder flexion (C5) Bilateral:;4 (Good);4+ (Good +)  -RS    Elbow flexion/wrist extension (C6) Bilateral:;4+ (Good +)  -RS    Elbow extension/wrist flexion (C7) Bilateral:;4+ (Good +)  -RS    Finger flexion/ (C8) Bilateral:;4+ (Good +)  -RS    Finger abduction (T1) Bilateral:;4+ (Good +)  -RS    Special Tests/Palpation    Special Tests/Palpation Cervical/Thoracic   Negative hook test left ribs.    -RS    Cervical Palpation    Cervical Palpation- Location? Cervical facets;Levator scapula;Upper traps;Ribs  -RS    Cervical Facets Left:;Tender   C3-7  -RS    Levator Scapula Left:;Guarded/taut;Tender  -RS    Upper Traps Left:;Guarded/taut;Tender  -RS    Ribs Left:;Tender   T7-9  -RS    Cervical Accessory Motions    Cervical Accessory Motions Tested? Yes  -RS    Facet closing Hypomobile  -RS    Sideglide- C4 Hypomobile;Left:  -RS    Sideglide- C5 Hypomobile;Left:  -RS    Sideglide- C6 Hypomobile;Left:  -RS    Sideglide- C7 Hypomobile;Left:  -RS    Thoracic Accessory Motions    Thoracic Accessory Motions Tested? --   grossly  assessed in sidelying; unable to be prone  -RS    Pa glide- Upper thoracic Hypomobile;Center:  -RS    Rib Mobility    Rib Mobility Accessory Motions Tested? Yes  -RS    Rib Mobility- T6 WNL  -RS    Rib Mobility- T7 WNL  -RS    Rib Mobility- T8 Hypomobile;Inhalation;Posterior Position  -RS    Rib Mobility- T9 Hypomobile;Inhalation;Elevated Position;Posterior Position  -RS    Rib Mobility- T10 WNL  -RS    Cervical/Thoracic Special Tests    Spurlings (Foraminal Compression) Left:;Positive  -RS    Cervical Compression (Forarminal Compression vs. Facet Pain) Left:;Positive  -RS    Cervical Distraction (Foraminal Compression vs. Facet Pain) Left:;Negative  -RS    1st Rib Mobility (TOS) Left:;Negative  -RS    ROM (Range of Motion)    General ROM no range of motion deficits identified  -RS    General Head/Neck/Trunk Assessment    ROM neck ROM deficit;trunk ROM deficit  -RS    ROM Detail trunk rotation limited 75% to left; 50% right AROM  -RS    Neck    Flexion AROM Deficit 50%  -RS    Extension AROM Deficit 25%  -RS    Lt Lat Flexion AROM Deficit 75%  -RS    Rt Lateral Flexion AROM Deficit 50%  -RS    Lt Rotation AROM Deficit 50%  -RS    Rt Rotation AROM Deficit 25%  -RS    Pathomechanics    Spine Pathomechanics Hinges into extension in lower cervical;Limited upper thoracic motion with cervical ROM;Excessive accessory muscle use with inhalation;Excessive lower rib flare with lying supine;Excessive thoracic kyphosis with forward bend;Limited lumbar flattening with forward bend  -RS    Pathomechanics Comments Patient does not reverse the lumbar curve with forward bend, stopping due to pain.  Sub costal angle is >100 degrees in supine.  The lower ribs appear to be stuck into an inhalation position but move symmetrically.  The trunk is rotated CW with a CCW TL junction compensatory rotation.    -RS      User Key  (r) = Recorded By, (t) = Taken By, (c) = Cosigned By    Initials Name Provider Type    RS Surendra Carter, PT  DPT Physical Therapist                            Therapy Education       06/12/17 1500          Therapy Education    Given Symptoms/condition management  -RS      Program Reinforced  -RS      How Provided Verbal  -RS      Provided to Patient  -RS      Level of Understanding Verbalized  -RS        User Key  (r) = Recorded By, (t) = Taken By, (c) = Cosigned By    Initials Name Provider Type    MARJORIE Carter, PT DPT Physical Therapist                PT OP Goals       06/12/17 1500       PT Short Term Goals    STG Date to Achieve 07/03/17  -RS     STG 1 Patient will report a 50% improvement in symptoms (neck and rib cage) on the VAS.  -RS     STG 1 Progress New  -RS     STG 2 Patient will improve left cervical rotation by 25% actively  -RS     STG 2 Progress New  -RS     Long Term Goals    LTG Date to Achieve 07/24/17  -RS     LTG 1 Patient will be independent with a comprehensive HEP by discharge  -RS     LTG 1 Progress New  -RS     LTG 2 Patient will demonstrate cervical AROM to within 75% of normal in all planes with symptoms <3/10 on the VAS by discharge.  -RS     LTG 2 Progress New  -RS     LTG 3 Patient will demonstrate active trunk rotation bilaterally to 75% of age related norms with symptoms <3/10 on the VAS by discharge.  -RS     LTG 3 Progress New  -RS     LTG 4 Patient will report symptoms <3/10 on the VAS with ADL's such as reaching into cabinets, dressing, and with bathing by discharge.  -RS     LTG 4 Progress New  -RS     Time Calculation    PT Goal Re-Cert Due Date 07/12/17  -RS       User Key  (r) = Recorded By, (t) = Taken By, (c) = Cosigned By    Initials Name Provider Type    MARJORIE Carter, PT DPT Physical Therapist                PT Assessment/Plan       06/12/17 1500       PT Assessment    Functional Limitations Performance in leisure activities;Performance in self-care ADL  -RS     Impairments Joint mobility;Muscle strength;Pain;Poor body mechanics;Posture;Range of motion   -RS     Assessment Comments Patient presents to therapy for similar complaints of neck pain and left sided rib cage pain, which was treated 2 months prior.  The patient was not compliant with the previous program and this was discussed today during the evaluation.  The patient's symptoms are consistent with mechanical upper and lower thoracic issues.  The patient has become kinesiophobic and has limited AROM in all planes.  The exaggerated kyphosis does exacerbate both the upper and lower thoracic spine mechanical stresses.  Patient does have a primarily upper chest breathing pattern with a wide sub costal angle (poor lower rib cage expansion).  This could be the result of ribs stuck into inhalation on the lower left thoracic vs. strain from the rotational adaptation that is chronic since last October.  Pain is not radicular in nature and is not consistent with discogenic type patterns.  Central sensitization is another possibility due to long standing pain.  Resulting muscle guarding does also make a source diagnosis more difficult.  Patient is motivated to improve and understands that compliance with treatment is a must for success.  Thank you for allowing us to work with this patient.  -RS     Please refer to paper survey for additional self-reported information Yes  -RS     Rehab Potential Fair   Good to fair  -RS     Patient/caregiver participated in establishment of treatment plan and goals Yes  -RS     Patient would benefit from skilled therapy intervention Yes  -RS     PT Plan    PT Frequency 2x/week  -RS     Predicted Duration of Therapy Intervention (days/wks) 4-6 weeks  -RS     Planned CPT's? PT EVAL MOD COMPLELITY: 68435;PT THER PROC EA 15 MIN: 97257;PT MANUAL THERAPY EA 15 MIN: 56578;PT NEUROMUSC RE-EDUCATION EA 15 MIN: 61369;PT HOT OR COLD PACK TREAT MCARE;PT ELECTRICAL STIM UNATTEND: ;PT ELECTRICAL STIM ATTD EA 15 MIN: 14385  -RS     Physical Therapy Interventions (Optional Details) home exercise  program;joint mobilization;manual therapy techniques;modalities;neuromuscular re-education;patient/family education;postural re-education;ROM (Range of Motion);strengthening;stretching;swiss ball techniques;taping  -RS     PT Plan Comments We will begin with decreasing the guarding and with HVLA to the upper thoracic and TL junction to assess response on symptoms.  We will utilize modalities and taping to promote a neutral spine initially.  Focus will be compliance with the HEP and active approaches to treatment.    -RS       User Key  (r) = Recorded By, (t) = Taken By, (c) = Cosigned By    Initials Name Provider Type    RS Surendra Carter, PT DPT Physical Therapist                                    Time Calculation:   Start Time: 1455  Stop Time: 1610  Time Calculation (min): 75 min     Therapy Charges for Today     Code Description Service Date Service Provider Modifiers Qty    88189025591  PT EVAL MOD COMPLEXITY 4 6/12/2017 Surendra Carter, PT DPT GP 1    [id number]  PT THER SUPP EA 15 MIN 6/12/2017 Surendra Carter, PT DPT GP 1                    SEAN Carter, PT DPT  6/13/2017

## 2017-06-21 ENCOUNTER — HOSPITAL ENCOUNTER (OUTPATIENT)
Dept: PHYSICAL THERAPY | Facility: HOSPITAL | Age: 52
Setting detail: THERAPIES SERIES
Discharge: HOME | End: 2017-06-21

## 2017-06-21 DIAGNOSIS — M54.2 NECK PAIN: Primary | ICD-10-CM

## 2017-06-21 DIAGNOSIS — R07.81 RIB PAIN ON LEFT SIDE: ICD-10-CM

## 2017-06-21 PROCEDURE — 97110 THERAPEUTIC EXERCISES: CPT

## 2017-06-21 PROCEDURE — 97140 MANUAL THERAPY 1/> REGIONS: CPT

## 2017-06-21 NOTE — THERAPY TREATMENT NOTE
Outpatient Physical Therapy Ortho Treatment Note  [facility]     Patient Name: Shirley Colby  : 1965  MRN: [id number]  Today's Date: 2017      Visit Date: 2017    Visit Dx:    ICD-10-CM ICD-9-CM   1. Neck pain M54.2 723.1   2. Rib pain on left side R07.81 786.50       There is no problem list on file for this patient.       Past Medical History:   Diagnosis Date   • Blurred vision    • Chronic back pain    • Colon polyp    • COPD (chronic obstructive pulmonary disease)    • Fatty liver    • GERD (gastroesophageal reflux disease)    • Hypercholesterolemia    • Hyperlipidemia    • Hypertension    • Insomnia    • Neuropathy     BILAT FEET   • On home oxygen therapy     OXYGEN AT 2L PER NC AT    • Parkinson disease    • Restless leg    • Seizures         Past Surgical History:   Procedure Laterality Date   • BREAST BIOPSY      right breast   • CATARACT EXTRACTION W/ INTRAOCULAR LENS IMPLANT Right 2017    Procedure: CATARACT PHACO EXTRACTION WITH INTRAOCULAR LENS IMPLANT, RIGHT;  Surgeon: Huan Lynn MD;  Location: [facility];  Service:    • EYE SURGERY      TOOK FILM OFF EYE   • HYSTERECTOMY                               PT Assessment/Plan       17 1200       PT Assessment    Assessment Comments Today is the first session since the evaluation, so no goals are met at this time.  the pain was reproduced today with active trunk rotation along the rib cage.  We did not attempt the HVLA due to guarding and response from the evaluation but we will consider this going forward.    -RS     PT Plan    PT Plan Comments continue with the focus on an active approaoch with treatment; ease into manual OP to reduce guarding.  -RS       User Key  (r) = Recorded By, (t) = Taken By, (c) = Cosigned By    Initials Name Provider Type    RS Surendra Carter, PT DPT Physical Therapist                    Exercises       17 1100          Subjective Comments    Subjective Comments Patient was very  sore after the evaluation.  She   -RS      Subjective Pain    Able to rate subjective pain? yes  -RS      Pre-Treatment Pain Level 7  -RS      Post-Treatment Pain Level 7  -RS      Subjective Pain Comment left mid/low back  -RS      Exercise 1    Exercise Name 1 open book thoracic rotation to the left  -RS      Cueing 1 Demo  -RS      Sets 1 4  -RS      Reps 1 20  -RS      Exercise 2    Exercise Name 2 sitting active thoracic sidebend anni  -RS      Cueing 2 Demo  -RS      Sets 2 4  -RS      Reps 2 15  -RS        User Key  (r) = Recorded By, (t) = Taken By, (c) = Cosigned By    Initials Name Provider Type    RS Surendra Carter, PT DPT Physical Therapist                        Manual Rx (last 36 hours)      Manual Treatments       06/21/17 1300          Manual Rx 1    Manual Rx 1 Location IASTM left thoracic  -RS      Manual Rx 1 Grade mod OP  -RS      Manual Rx 1 Duration 10  -RS      Manual Rx 2    Manual Rx 2 Location PVMS left thoracic rotation stretch  -RS      Manual Rx 2 Type sustained from therapist  -RS      Manual Rx 2 Grade min OP  -RS      Manual Rx 2 Duration 5  -RS      Manual Rx 3    Manual Rx 3 Location left T6-9 ribs  -RS      Manual Rx 3 Type inferior oscillation  -RS      Manual Rx 3 Grade 2/3  -RS      Manual Rx 3 Duration 5  -RS      Manual Rx 4    Manual Rx 4 Location left T6-10   -RS      Manual Rx 4 Type UPA oscillatory  -RS      Manual Rx 4 Grade 2  -RS      Manual Rx 4 Duration 5  -RS        User Key  (r) = Recorded By, (t) = Taken By, (c) = Cosigned By    Initials Name Provider Type    RS Surendra Carter, PT DPT Physical Therapist                PT OP Goals       06/21/17 1200       PT Short Term Goals    STG Date to Achieve 07/03/17  -RS     STG 1 Patient will report a 50% improvement in symptoms (neck and rib cage) on the VAS.  -RS     STG 1 Progress Ongoing  -RS     STG 2 Patient will improve left cervical rotation by 25% actively  -RS     STG 2 Progress Ongoing  -RS     Long  Term Goals    LTG Date to Achieve 07/24/17  -RS     LTG 1 Patient will be independent with a comprehensive HEP by discharge  -RS     LTG 1 Progress Ongoing;Progressing  -RS     LTG 1 Progress Comments active thoracic motion; ribcage motion.  -RS     LTG 2 Patient will demonstrate cervical AROM to within 75% of normal in all planes with symptoms <3/10 on the VAS by discharge.  -RS     LTG 2 Progress Ongoing  -RS     LTG 3 Patient will demonstrate active trunk rotation bilaterally to 75% of age related norms with symptoms <3/10 on the VAS by discharge.  -RS     LTG 3 Progress Ongoing  -RS     LTG 4 Patient will report symptoms <3/10 on the VAS with ADL's such as reaching into cabinets, dressing, and with bathing by discharge.  -RS     LTG 4 Progress Ongoing  -RS     Time Calculation    PT Goal Re-Cert Due Date 07/12/17  -RS       User Key  (r) = Recorded By, (t) = Taken By, (c) = Cosigned By    Initials Name Provider Type    RS Surendra Carter, PT DPT Physical Therapist                Therapy Education       06/21/17 1200          Therapy Education    Given HEP;Symptoms/condition management  -RS      Program Progressed   active open book; sidebending  -RS      How Provided Demonstration;Verbal  -RS      Provided to Patient  -RS      Level of Understanding Verbalized;Demonstrated  -RS        User Key  (r) = Recorded By, (t) = Taken By, (c) = Cosigned By    Initials Name Provider Type    MARJORIE Carter, PT DPT Physical Therapist                Time Calculation:   Start Time: 1120  Stop Time: 1205  Time Calculation (min): 45 min  PT Non-Billable Time (min): 4 min  Total Timed Code Minutes- PT: 41 minute(s)    Therapy Charges for Today     Code Description Service Date Service Provider Modifiers Qty    44033680557 HC PT MANUAL THERAPY EA 15 MIN 6/21/2017 Surendra Carter, PT DPT GP 2    28580045626 HC PT THER PROC EA 15 MIN 6/21/2017 Surendra Carter, PT DPT GP 1                    SEAN Velazquez  Raul, PT DPT  6/21/2017

## 2017-06-26 ENCOUNTER — APPOINTMENT (OUTPATIENT)
Dept: PHYSICAL THERAPY | Facility: HOSPITAL | Age: 52
End: 2017-06-26

## 2017-06-28 ENCOUNTER — HOSPITAL ENCOUNTER (OUTPATIENT)
Dept: PHYSICAL THERAPY | Facility: HOSPITAL | Age: 52
Setting detail: THERAPIES SERIES
Discharge: HOME | End: 2017-06-28

## 2017-06-28 DIAGNOSIS — M54.2 NECK PAIN: Primary | ICD-10-CM

## 2017-06-28 DIAGNOSIS — R07.81 RIB PAIN ON LEFT SIDE: ICD-10-CM

## 2017-06-28 PROCEDURE — 97110 THERAPEUTIC EXERCISES: CPT

## 2017-06-28 NOTE — THERAPY TREATMENT NOTE
Outpatient Physical Therapy Ortho Treatment Note  [facility]     Patient Name: Shirley Colby  : 1965  MRN: [id number]  Today's Date: 2017      Visit Date: 2017    Visit Dx:    ICD-10-CM ICD-9-CM   1. Neck pain M54.2 723.1   2. Rib pain on left side R07.81 786.50       There is no problem list on file for this patient.       Past Medical History:   Diagnosis Date   • Blurred vision    • Chronic back pain    • Colon polyp    • COPD (chronic obstructive pulmonary disease)    • Fatty liver    • GERD (gastroesophageal reflux disease)    • Hypercholesterolemia    • Hyperlipidemia    • Hypertension    • Insomnia    • Neuropathy     BILAT FEET   • On home oxygen therapy     OXYGEN AT 2L PER NC AT HS   • Parkinson disease    • Restless leg    • Seizures         Past Surgical History:   Procedure Laterality Date   • BREAST BIOPSY      right breast   • CATARACT EXTRACTION W/ INTRAOCULAR LENS IMPLANT Right 2017    Procedure: CATARACT PHACO EXTRACTION WITH INTRAOCULAR LENS IMPLANT, RIGHT;  Surgeon: Huan Lynn MD;  Location: [facility];  Service:    • EYE SURGERY      TOOK FILM OFF EYE   • HYSTERECTOMY               PT Ortho       17 1000    Subjective Pain    Post-Treatment Pain Level 7  -RS      User Key  (r) = Recorded By, (t) = Taken By, (c) = Cosigned By    Initials Name Provider Type    RS Surendra Carter, PT DPT Physical Therapist                            PT Assessment/Plan       17 1000       PT Assessment    Assessment Comments Patient was reporting that she has virtually no pain in her rib cage with normal activities.  She is still only 25% through thoracic ROM, and 25% through cervical ROM.  There is starting to be more mobility through the thoracic spine, and we will continue working on posture and extending through the thoracic kyphosis to allow for increased cervical ROM.  Patient's HEP was progressed to include a thoracic extension stretch. The patient's meds were  also changed around, and she felt fatigued at the end of therapy.  We will re-assess how she reacts to today's session at the next appointment.   -RS     PT Plan    PT Plan Comments Continue working on thoracic mobility and cervical ROM.  Also work on decreasing pain if the patient is experiencing extreme pain.   -RS       User Key  (r) = Recorded By, (t) = Taken By, (c) = Cosigned By    Initials Name Provider Type    RS Surendra Carter, PT DPT Physical Therapist                    Exercises       06/28/17 1000          Subjective Comments    Subjective Comments (P)  Pt reports the pain is mostly in her neck and that she is compliant with her HEP.   -FM      Subjective Pain    Able to rate subjective pain? (P)  yes  -FM      Pre-Treatment Pain Level (P)  6  -FM      Post-Treatment Pain Level 7  -RS      Exercise 1    Exercise Name 1 Supine overhead Lat stretch  -RS      Cueing 1 Verbal;Tactile  -RS      Equipment 1 Dowel  -RS      Weights/Plates 1 1  -RS      Sets 1 3  -RS      Reps 1 10  -RS      Exercise 2    Exercise Name 2 Sitting EOB B thoracic rotation with OP  -RS      Cueing 2 Verbal  -RS      Sets 2 3  -RS      Time (Seconds) 2 30  -RS      Exercise 3    Exercise Name 3 Facing wall thoracic extension  -RS      Cueing 3 Verbal;Demo  -RS      Sets 3 3  -RS      Reps 3 5  -RS      Exercise 4    Exercise Name 4 Lying supine on towel roll in mid thoracic spine  -RS      Cueing 4 Tactile  -RS      Time (Minutes) 4 5  -RS      Exercise 5    Exercise Name 5 Lying supine with arms ER and hands on forehead  -RS      Cueing 5 Verbal  -RS      Time (Minutes) 5 5  -RS        User Key  (r) = Recorded By, (t) = Taken By, (c) = Cosigned By    Initials Name Provider Type    RS Surendra Carter, PT DPT Physical Therapist    FM Sameera Harrington, PT Student PT Student                               PT OP Goals       06/28/17 1000       PT Short Term Goals    STG Date to Achieve 07/03/17  -RS     STG 1 Patient will report  a 50% improvement in symptoms (neck and rib cage) on the VAS.  -RS     STG 1 Progress Progressing  -RS     STG 1 Progress Comments Patient is reporting she has virtually no pain in her rib cage.  -RS     STG 2 Patient will improve left cervical rotation by 25% actively  -RS     STG 2 Progress Ongoing  -RS     Long Term Goals    LTG Date to Achieve 07/24/17  -RS     LTG 1 Patient will be independent with a comprehensive HEP by discharge  -RS     LTG 1 Progress Ongoing;Progressing  -RS     LTG 2 Patient will demonstrate cervical AROM to within 75% of normal in all planes with symptoms <3/10 on the VAS by discharge.  -RS     LTG 2 Progress Ongoing  -RS     LTG 3 Patient will demonstrate active trunk rotation bilaterally to 75% of age related norms with symptoms <3/10 on the VAS by discharge.  -RS     LTG 3 Progress Ongoing  -RS     LTG 4 Patient will report symptoms <3/10 on the VAS with ADL's such as reaching into cabinets, dressing, and with bathing by discharge.  -RS     LTG 4 Progress Ongoing  -RS     Time Calculation    PT Goal Re-Cert Due Date 07/12/17  -RS       User Key  (r) = Recorded By, (t) = Taken By, (c) = Cosigned By    Initials Name Provider Type    MARJORIE Carter, PT DPT Physical Therapist                Therapy Education       06/28/17 1000          Therapy Education    Given HEP;Symptoms/condition management  -RS      Program New   lying supine with arms ER and hands on forehead  -RS      How Provided Verbal  -RS      Provided to Patient  -RS      Level of Understanding Verbalized;Demonstrated  -RS        User Key  (r) = Recorded By, (t) = Taken By, (c) = Cosigned By    Initials Name Provider Type    MARJORIE Carter, PT DPT Physical Therapist                Time Calculation:   Start Time: 1000  Stop Time: 1045  Time Calculation (min): 45 min                  Sameera Harrington, PT Student  6/28/2017

## 2017-07-12 ENCOUNTER — APPOINTMENT (OUTPATIENT)
Dept: CT IMAGING | Facility: HOSPITAL | Age: 52
End: 2017-07-12

## 2017-07-12 ENCOUNTER — APPOINTMENT (OUTPATIENT)
Dept: GENERAL RADIOLOGY | Facility: HOSPITAL | Age: 52
End: 2017-07-12

## 2017-07-12 ENCOUNTER — DOCUMENTATION (OUTPATIENT)
Dept: PHYSICAL THERAPY | Facility: HOSPITAL | Age: 52
End: 2017-07-12

## 2017-07-12 ENCOUNTER — HOSPITAL ENCOUNTER (OUTPATIENT)
Facility: HOSPITAL | Age: 52
Setting detail: OBSERVATION
Discharge: HOME | End: 2017-07-13
Attending: INTERNAL MEDICINE | Admitting: FAMILY MEDICINE

## 2017-07-12 DIAGNOSIS — M25.552 LEFT HIP PAIN: ICD-10-CM

## 2017-07-12 DIAGNOSIS — M54.50 CHRONIC BILATERAL LOW BACK PAIN WITHOUT SCIATICA: ICD-10-CM

## 2017-07-12 DIAGNOSIS — G89.29 CHRONIC BILATERAL LOW BACK PAIN WITHOUT SCIATICA: ICD-10-CM

## 2017-07-12 DIAGNOSIS — R07.81 RIB PAIN ON LEFT SIDE: ICD-10-CM

## 2017-07-12 DIAGNOSIS — M54.2 NECK PAIN: Primary | ICD-10-CM

## 2017-07-12 DIAGNOSIS — R07.9 CHEST PAIN, UNSPECIFIED TYPE: Primary | ICD-10-CM

## 2017-07-12 LAB
ALBUMIN SERPL-MCNC: 4.5 G/DL (ref 3.5–5)
ALBUMIN/GLOB SERPL: 1.2 G/DL (ref 1.1–2.5)
ALP SERPL-CCNC: 79 U/L (ref 24–120)
ALT SERPL W P-5'-P-CCNC: 41 U/L (ref 0–54)
ANION GAP SERPL CALCULATED.3IONS-SCNC: 11 MMOL/L (ref 4–13)
AST SERPL-CCNC: 30 U/L (ref 7–45)
BASOPHILS # BLD AUTO: 0.02 10*3/MM3 (ref 0–0.2)
BASOPHILS NFR BLD AUTO: 0.4 % (ref 0–2)
BILIRUB SERPL-MCNC: 0.5 MG/DL (ref 0.1–1)
BUN BLD-MCNC: 10 MG/DL (ref 5–21)
BUN/CREAT SERPL: 14.3 (ref 7–25)
CALCIUM SPEC-SCNC: 9.6 MG/DL (ref 8.4–10.4)
CHLORIDE SERPL-SCNC: 106 MMOL/L (ref 98–110)
CO2 SERPL-SCNC: 25 MMOL/L (ref 24–31)
CREAT BLD-MCNC: 0.7 MG/DL (ref 0.5–1.4)
DEPRECATED RDW RBC AUTO: 42.1 FL (ref 40–54)
EOSINOPHIL # BLD AUTO: 0.14 10*3/MM3 (ref 0–0.7)
EOSINOPHIL NFR BLD AUTO: 3 % (ref 0–4)
ERYTHROCYTE [DISTWIDTH] IN BLOOD BY AUTOMATED COUNT: 13 % (ref 12–15)
GFR SERPL CREATININE-BSD FRML MDRD: 88 ML/MIN/1.73
GLOBULIN UR ELPH-MCNC: 3.7 GM/DL
GLUCOSE BLD-MCNC: 78 MG/DL (ref 70–100)
HCT VFR BLD AUTO: 36.8 % (ref 37–47)
HGB BLD-MCNC: 12 G/DL (ref 12–16)
HOLD SPECIMEN: NORMAL
HOLD SPECIMEN: NORMAL
IMM GRANULOCYTES # BLD: 0.02 10*3/MM3 (ref 0–0.03)
IMM GRANULOCYTES NFR BLD: 0.4 % (ref 0–5)
LYMPHOCYTES # BLD AUTO: 2.02 10*3/MM3 (ref 0.72–4.86)
LYMPHOCYTES NFR BLD AUTO: 43.7 % (ref 15–45)
MCH RBC QN AUTO: 28.8 PG (ref 28–32)
MCHC RBC AUTO-ENTMCNC: 32.6 G/DL (ref 33–36)
MCV RBC AUTO: 88.5 FL (ref 82–98)
MONOCYTES # BLD AUTO: 0.3 10*3/MM3 (ref 0.19–1.3)
MONOCYTES NFR BLD AUTO: 6.5 % (ref 4–12)
NEUTROPHILS # BLD AUTO: 2.12 10*3/MM3 (ref 1.87–8.4)
NEUTROPHILS NFR BLD AUTO: 46 % (ref 39–78)
PLATELET # BLD AUTO: 283 10*3/MM3 (ref 130–400)
PMV BLD AUTO: 11.4 FL (ref 6–12)
POTASSIUM BLD-SCNC: 4.5 MMOL/L (ref 3.5–5.3)
PROT SERPL-MCNC: 8.2 G/DL (ref 6.3–8.7)
RBC # BLD AUTO: 4.16 10*6/MM3 (ref 4.2–5.4)
SODIUM BLD-SCNC: 142 MMOL/L (ref 135–145)
TROPONIN I SERPL-MCNC: <0.012 NG/ML (ref 0–0.03)
TROPONIN I SERPL-MCNC: <0.012 NG/ML (ref 0–0.03)
WBC NRBC COR # BLD: 4.62 10*3/MM3 (ref 4.8–10.8)
WHOLE BLOOD HOLD SPECIMEN: NORMAL
WHOLE BLOOD HOLD SPECIMEN: NORMAL

## 2017-07-12 PROCEDURE — 85025 COMPLETE CBC W/AUTO DIFF WBC: CPT | Performed by: PHYSICIAN ASSISTANT

## 2017-07-12 PROCEDURE — 80053 COMPREHEN METABOLIC PANEL: CPT | Performed by: PHYSICIAN ASSISTANT

## 2017-07-12 PROCEDURE — 93005 ELECTROCARDIOGRAM TRACING: CPT

## 2017-07-12 PROCEDURE — 93010 ELECTROCARDIOGRAM REPORT: CPT | Performed by: INTERNAL MEDICINE

## 2017-07-12 PROCEDURE — 85379 FIBRIN DEGRADATION QUANT: CPT | Performed by: PHYSICIAN ASSISTANT

## 2017-07-12 PROCEDURE — 84484 ASSAY OF TROPONIN QUANT: CPT

## 2017-07-12 PROCEDURE — 99285 EMERGENCY DEPT VISIT HI MDM: CPT

## 2017-07-12 PROCEDURE — 70450 CT HEAD/BRAIN W/O DYE: CPT

## 2017-07-12 PROCEDURE — 93005 ELECTROCARDIOGRAM TRACING: CPT | Performed by: PHYSICIAN ASSISTANT

## 2017-07-12 PROCEDURE — 71020 HC CHEST PA AND LATERAL: CPT

## 2017-07-12 PROCEDURE — 84484 ASSAY OF TROPONIN QUANT: CPT | Performed by: PHYSICIAN ASSISTANT

## 2017-07-12 RX ORDER — GLIMEPIRIDE 2 MG/1
2 TABLET ORAL
COMMUNITY
End: 2022-09-15

## 2017-07-12 RX ORDER — ASPIRIN 81 MG/1
324 TABLET, CHEWABLE ORAL ONCE
Status: COMPLETED | OUTPATIENT
Start: 2017-07-12 | End: 2017-07-12

## 2017-07-12 RX ORDER — NITROGLYCERIN 0.4 MG/1
0.4 TABLET SUBLINGUAL
Status: DISCONTINUED | OUTPATIENT
Start: 2017-07-12 | End: 2017-07-13 | Stop reason: HOSPADM

## 2017-07-12 RX ADMIN — ASPIRIN 81 MG 324 MG: 81 TABLET ORAL at 20:33

## 2017-07-12 RX ADMIN — NITROGLYCERIN 0.4 MG: 0.4 TABLET SUBLINGUAL at 21:47

## 2017-07-12 NOTE — THERAPY DISCHARGE NOTE
Outpatient Physical Therapy Discharge Summary         Patient Name: Shilrey Colby  : 1965  MRN: [id number]    Today's Date: 2017    Visit Dx:    ICD-10-CM ICD-9-CM   1. Neck pain M54.2 723.1   2. Rib pain on left side R07.81 786.50   3. Left hip pain M25.552 719.45   4. Chronic bilateral low back pain without sciatica M54.5 724.2    G89.29 338.29             PT OP Goals       17 0900       PT Short Term Goals    STG Date to Achieve 17  -TB     STG 1 Patient will report a 50% improvement in symptoms (neck and rib cage) on the VAS.  -TB     STG 1 Progress Partially Met  -TB     STG 1 Progress Comments Patient is reporting she has virtually no pain in her rib cage. Pain was mostly in neck.  -TB     STG 2 Patient will improve left cervical rotation by 25% actively  -TB     STG 2 Progress Partially Met  -TB     Long Term Goals    LTG Date to Achieve 17  -TB     LTG 1 Patient will be independent with a comprehensive HEP by discharge  -TB     LTG 1 Progress Partially Met  -TB     LTG 1 Progress Comments active thoracic motion; ribcage motion.  -TB     LTG 2 Patient will demonstrate cervical AROM to within 75% of normal in all planes with symptoms <3/10 on the VAS by discharge.  -TB     LTG 2 Progress Not Met  -TB     LTG 3 Patient will demonstrate active trunk rotation bilaterally to 75% of age related norms with symptoms <3/10 on the VAS by discharge.  -TB     LTG 3 Progress --   unable to assess  -TB     LTG 4 Patient will report symptoms <3/10 on the VAS with ADL's such as reaching into cabinets, dressing, and with bathing by discharge.  -TB     LTG 4 Progress Not Met  -TB     LTG 4 Progress Comments 6/10 in neck  -TB     LTG 5 Pt will core 30% or less on Osw[place].   -TB     LTG 5 Progress Not Met  -TB       User Key  (r) = Recorded By, (t) = Taken By, (c) = Cosigned By    Initials Name Provider Type    TB Mark Jimenez, PT Physical Therapist          OP PT Discharge Summary  Date  of Discharge: 07/12/17  Reason for Discharge: Non-compliant (did no show for last 3 appts)  Outcomes Achieved: Patient able to partially acheive established goals  Discharge Destination: Home with home program  Discharge Instructions: She attended 3 visits including her eval and then no showed the last 3. I had called her on her last no show to advise we would d/c if she no showed again. She never attempted to contact us. Her rib pain had improved and on her last visit, her neck was her c/c.      Time Calculation:                    Mark Jimenez, PT  7/12/2017

## 2017-07-13 ENCOUNTER — TELEPHONE (OUTPATIENT)
Dept: PHYSICAL THERAPY | Facility: HOSPITAL | Age: 52
End: 2017-07-13

## 2017-07-13 ENCOUNTER — APPOINTMENT (OUTPATIENT)
Dept: CARDIOLOGY | Facility: HOSPITAL | Age: 52
End: 2017-07-13

## 2017-07-13 VITALS
WEIGHT: 289.8 LBS | HEIGHT: 64 IN | SYSTOLIC BLOOD PRESSURE: 117 MMHG | OXYGEN SATURATION: 96 % | BODY MASS INDEX: 49.47 KG/M2 | RESPIRATION RATE: 18 BRPM | HEART RATE: 70 BPM | TEMPERATURE: 97.5 F | DIASTOLIC BLOOD PRESSURE: 71 MMHG

## 2017-07-13 PROBLEM — R07.9 CHEST PAIN: Status: ACTIVE | Noted: 2017-07-13

## 2017-07-13 LAB
ARTICHOKE IGE QN: 114 MG/DL (ref 0–99)
BH CV STRESS BP STAGE 1: NORMAL
BH CV STRESS BP STAGE 2: NORMAL
BH CV STRESS BP STAGE 3: NORMAL
BH CV STRESS DOSE DOBUTAMINE STAGE 1: 10
BH CV STRESS DOSE DOBUTAMINE STAGE 2: 20
BH CV STRESS DOSE DOBUTAMINE STAGE 3: 30
BH CV STRESS DURATION MIN STAGE 1: 3
BH CV STRESS DURATION MIN STAGE 2: 3
BH CV STRESS DURATION MIN STAGE 3: 3
BH CV STRESS DURATION SEC STAGE 1: 0
BH CV STRESS DURATION SEC STAGE 2: 0
BH CV STRESS DURATION SEC STAGE 3: 26
BH CV STRESS HR STAGE 1: 72
BH CV STRESS HR STAGE 2: 126
BH CV STRESS HR STAGE 3: 149
BH CV STRESS PROTOCOL 1: NORMAL
BH CV STRESS RECOVERY BP: NORMAL MMHG
BH CV STRESS RECOVERY HR: 85 BPM
BH CV STRESS STAGE 1: 1
BH CV STRESS STAGE 2: 2
BH CV STRESS STAGE 3: 3
BILIRUB UR QL STRIP: NEGATIVE
CHOLEST SERPL-MCNC: 197 MG/DL (ref 130–200)
CLARITY UR: CLEAR
COLOR UR: YELLOW
D DIMER PPP FEU-MCNC: 0.28 MG/L (FEU) (ref 0–0.5)
GLUCOSE BLDC GLUCOMTR-MCNC: 120 MG/DL (ref 70–130)
GLUCOSE BLDC GLUCOMTR-MCNC: 138 MG/DL (ref 70–130)
GLUCOSE UR STRIP-MCNC: NEGATIVE MG/DL
HBA1C MFR BLD: 6.6 %
HDLC SERPL-MCNC: 35 MG/DL
HGB UR QL STRIP.AUTO: NEGATIVE
KETONES UR QL STRIP: NEGATIVE
LDLC/HDLC SERPL: 2.97 {RATIO}
LEUKOCYTE ESTERASE UR QL STRIP.AUTO: NEGATIVE
MAGNESIUM SERPL-MCNC: 1.9 MG/DL (ref 1.4–2.2)
MAXIMAL PREDICTED HEART RATE: 167 BPM
NITRITE UR QL STRIP: NEGATIVE
PERCENT MAX PREDICTED HR: 89.22 %
PH UR STRIP.AUTO: 5.5 [PH] (ref 5–8)
PROT UR QL STRIP: NEGATIVE
SP GR UR STRIP: <=1.005 (ref 1–1.03)
STRESS BASELINE BP: NORMAL MMHG
STRESS BASELINE HR: 69 BPM
STRESS PERCENT HR: 105 %
STRESS POST EXERCISE DUR MIN: 9 MIN
STRESS POST EXERCISE DUR SEC: 26 SEC
STRESS POST PEAK BP: NORMAL MMHG
STRESS POST PEAK HR: 149 BPM
STRESS TARGET HR: 142 BPM
T3FREE SERPL-MCNC: 3.45 PG/ML (ref 2.77–5.27)
T4 FREE SERPL-MCNC: 0.8 NG/DL (ref 0.78–2.19)
TRIGL SERPL-MCNC: 290 MG/DL (ref 0–149)
TROPONIN I SERPL-MCNC: <0.012 NG/ML (ref 0–0.03)
TSH SERPL DL<=0.05 MIU/L-ACNC: 4.98 MIU/ML (ref 0.47–4.68)
UROBILINOGEN UR QL STRIP: NORMAL

## 2017-07-13 PROCEDURE — 25010000002 PERFLUTREN (DEFINITY) 8.476 MG IN SODIUM CHLORIDE 10 ML INJECTION: Performed by: FAMILY MEDICINE

## 2017-07-13 PROCEDURE — G0378 HOSPITAL OBSERVATION PER HR: HCPCS

## 2017-07-13 PROCEDURE — 84443 ASSAY THYROID STIM HORMONE: CPT | Performed by: INTERNAL MEDICINE

## 2017-07-13 PROCEDURE — 25010000003 DOBUTAMINE PER 250 MG: Performed by: FAMILY MEDICINE

## 2017-07-13 PROCEDURE — 82962 GLUCOSE BLOOD TEST: CPT

## 2017-07-13 PROCEDURE — 93017 CV STRESS TEST TRACING ONLY: CPT

## 2017-07-13 PROCEDURE — 84439 ASSAY OF FREE THYROXINE: CPT | Performed by: NURSE PRACTITIONER

## 2017-07-13 PROCEDURE — 94799 UNLISTED PULMONARY SVC/PX: CPT

## 2017-07-13 PROCEDURE — 83735 ASSAY OF MAGNESIUM: CPT | Performed by: INTERNAL MEDICINE

## 2017-07-13 PROCEDURE — 36415 COLL VENOUS BLD VENIPUNCTURE: CPT | Performed by: INTERNAL MEDICINE

## 2017-07-13 PROCEDURE — 84484 ASSAY OF TROPONIN QUANT: CPT | Performed by: INTERNAL MEDICINE

## 2017-07-13 PROCEDURE — 25010000002 ENOXAPARIN PER 10 MG: Performed by: INTERNAL MEDICINE

## 2017-07-13 PROCEDURE — 83036 HEMOGLOBIN GLYCOSYLATED A1C: CPT | Performed by: INTERNAL MEDICINE

## 2017-07-13 PROCEDURE — 84481 FREE ASSAY (FT-3): CPT | Performed by: NURSE PRACTITIONER

## 2017-07-13 PROCEDURE — 93018 CV STRESS TEST I&R ONLY: CPT | Performed by: INTERNAL MEDICINE

## 2017-07-13 PROCEDURE — 80061 LIPID PANEL: CPT | Performed by: INTERNAL MEDICINE

## 2017-07-13 PROCEDURE — C8928 TTE W OR W/O FOL W/CON,STRES: HCPCS

## 2017-07-13 PROCEDURE — 93350 STRESS TTE ONLY: CPT | Performed by: INTERNAL MEDICINE

## 2017-07-13 PROCEDURE — 93352 ADMIN ECG CONTRAST AGENT: CPT | Performed by: INTERNAL MEDICINE

## 2017-07-13 PROCEDURE — 81003 URINALYSIS AUTO W/O SCOPE: CPT | Performed by: INTERNAL MEDICINE

## 2017-07-13 PROCEDURE — 96372 THER/PROPH/DIAG INJ SC/IM: CPT

## 2017-07-13 RX ORDER — ATORVASTATIN CALCIUM 40 MG/1
40 TABLET, FILM COATED ORAL NIGHTLY
Qty: 30 TABLET | Refills: 2 | Status: SHIPPED | OUTPATIENT
Start: 2017-07-13

## 2017-07-13 RX ORDER — ALUMINA, MAGNESIA, AND SIMETHICONE 2400; 2400; 240 MG/30ML; MG/30ML; MG/30ML
30 SUSPENSION ORAL EVERY 6 HOURS PRN
Status: DISCONTINUED | OUTPATIENT
Start: 2017-07-13 | End: 2017-07-13 | Stop reason: HOSPADM

## 2017-07-13 RX ORDER — SODIUM CHLORIDE 0.9 % (FLUSH) 0.9 %
1-10 SYRINGE (ML) INJECTION AS NEEDED
Status: DISCONTINUED | OUTPATIENT
Start: 2017-07-13 | End: 2017-07-13 | Stop reason: HOSPADM

## 2017-07-13 RX ORDER — ONDANSETRON 2 MG/ML
4 INJECTION INTRAMUSCULAR; INTRAVENOUS EVERY 6 HOURS PRN
Status: DISCONTINUED | OUTPATIENT
Start: 2017-07-13 | End: 2017-07-13 | Stop reason: HOSPADM

## 2017-07-13 RX ORDER — AMITRIPTYLINE HYDROCHLORIDE 10 MG/1
10 TABLET, FILM COATED ORAL NIGHTLY
Status: DISCONTINUED | OUTPATIENT
Start: 2017-07-13 | End: 2017-07-13 | Stop reason: HOSPADM

## 2017-07-13 RX ORDER — IPRATROPIUM BROMIDE AND ALBUTEROL SULFATE 2.5; .5 MG/3ML; MG/3ML
3 SOLUTION RESPIRATORY (INHALATION) EVERY 4 HOURS PRN
Status: DISCONTINUED | OUTPATIENT
Start: 2017-07-13 | End: 2017-07-13 | Stop reason: HOSPADM

## 2017-07-13 RX ORDER — HYDROCODONE BITARTRATE AND ACETAMINOPHEN 7.5; 325 MG/1; MG/1
1 TABLET ORAL EVERY 6 HOURS PRN
Status: DISCONTINUED | OUTPATIENT
Start: 2017-07-13 | End: 2017-07-13 | Stop reason: HOSPADM

## 2017-07-13 RX ORDER — ASPIRIN 81 MG/1
81 TABLET ORAL DAILY
Status: DISCONTINUED | OUTPATIENT
Start: 2017-07-13 | End: 2017-07-13 | Stop reason: HOSPADM

## 2017-07-13 RX ORDER — ATORVASTATIN CALCIUM 40 MG/1
40 TABLET, FILM COATED ORAL NIGHTLY
Status: DISCONTINUED | OUTPATIENT
Start: 2017-07-13 | End: 2017-07-13 | Stop reason: HOSPADM

## 2017-07-13 RX ORDER — ACETAMINOPHEN 325 MG/1
650 TABLET ORAL EVERY 6 HOURS PRN
Status: DISCONTINUED | OUTPATIENT
Start: 2017-07-13 | End: 2017-07-13 | Stop reason: HOSPADM

## 2017-07-13 RX ORDER — ROPINIROLE 0.25 MG/1
0.5 TABLET, FILM COATED ORAL NIGHTLY
Status: DISCONTINUED | OUTPATIENT
Start: 2017-07-13 | End: 2017-07-13 | Stop reason: HOSPADM

## 2017-07-13 RX ORDER — NITROGLYCERIN 0.4 MG/1
0.4 TABLET SUBLINGUAL
Status: DISCONTINUED | OUTPATIENT
Start: 2017-07-13 | End: 2017-07-13 | Stop reason: SDUPTHER

## 2017-07-13 RX ORDER — GLIPIZIDE 5 MG/1
5 TABLET ORAL
Status: DISCONTINUED | OUTPATIENT
Start: 2017-07-13 | End: 2017-07-13 | Stop reason: HOSPADM

## 2017-07-13 RX ORDER — NICOTINE POLACRILEX 4 MG
15 LOZENGE BUCCAL
Status: DISCONTINUED | OUTPATIENT
Start: 2017-07-13 | End: 2017-07-13 | Stop reason: HOSPADM

## 2017-07-13 RX ORDER — DOBUTAMINE HYDROCHLORIDE 100 MG/100ML
10-50 INJECTION INTRAVENOUS
Status: DISCONTINUED | OUTPATIENT
Start: 2017-07-13 | End: 2017-07-13 | Stop reason: HOSPADM

## 2017-07-13 RX ORDER — CHOLECALCIFEROL (VITAMIN D3) 125 MCG
1000 CAPSULE ORAL DAILY
Status: DISCONTINUED | OUTPATIENT
Start: 2017-07-13 | End: 2017-07-13 | Stop reason: HOSPADM

## 2017-07-13 RX ORDER — LISINOPRIL 20 MG/1
20 TABLET ORAL
Status: DISCONTINUED | OUTPATIENT
Start: 2017-07-13 | End: 2017-07-13 | Stop reason: HOSPADM

## 2017-07-13 RX ORDER — DIAZEPAM 10 MG/1
10 TABLET ORAL EVERY 8 HOURS PRN
Status: DISCONTINUED | OUTPATIENT
Start: 2017-07-13 | End: 2017-07-13 | Stop reason: HOSPADM

## 2017-07-13 RX ORDER — ALPRAZOLAM 0.5 MG/1
0.25 TABLET ORAL EVERY 8 HOURS PRN
Status: DISCONTINUED | OUTPATIENT
Start: 2017-07-13 | End: 2017-07-13

## 2017-07-13 RX ORDER — DEXTROSE MONOHYDRATE 25 G/50ML
25 INJECTION, SOLUTION INTRAVENOUS
Status: DISCONTINUED | OUTPATIENT
Start: 2017-07-13 | End: 2017-07-13 | Stop reason: HOSPADM

## 2017-07-13 RX ORDER — PANTOPRAZOLE SODIUM 40 MG/1
40 TABLET, DELAYED RELEASE ORAL
Status: DISCONTINUED | OUTPATIENT
Start: 2017-07-13 | End: 2017-07-13 | Stop reason: HOSPADM

## 2017-07-13 RX ADMIN — PANTOPRAZOLE SODIUM 40 MG: 40 TABLET, DELAYED RELEASE ORAL at 06:06

## 2017-07-13 RX ADMIN — DIAZEPAM 10 MG: 10 TABLET ORAL at 06:06

## 2017-07-13 RX ADMIN — SODIUM CHLORIDE 10 ML: 9 INJECTION INTRAMUSCULAR; INTRAVENOUS; SUBCUTANEOUS at 10:46

## 2017-07-13 RX ADMIN — Medication 1000 MCG: at 08:22

## 2017-07-13 RX ADMIN — HYDROCODONE BITARTRATE AND ACETAMINOPHEN 1 TABLET: 7.5; 325 TABLET ORAL at 12:11

## 2017-07-13 RX ADMIN — ENOXAPARIN SODIUM 40 MG: 40 INJECTION SUBCUTANEOUS at 08:25

## 2017-07-13 RX ADMIN — AMITRIPTYLINE HYDROCHLORIDE 10 MG: 10 TABLET, FILM COATED ORAL at 01:57

## 2017-07-13 RX ADMIN — LISINOPRIL 20 MG: 20 TABLET ORAL at 08:22

## 2017-07-13 RX ADMIN — HYDROCODONE BITARTRATE AND ACETAMINOPHEN 1 TABLET: 7.5; 325 TABLET ORAL at 06:06

## 2017-07-13 RX ADMIN — ROPINIROLE HYDROCHLORIDE 0.5 MG: 0.25 TABLET, FILM COATED ORAL at 01:57

## 2017-07-13 RX ADMIN — DOBUTAMINE HYDROCHLORIDE 10 MCG/KG/MIN: 100 INJECTION INTRAVENOUS at 10:45

## 2017-07-13 NOTE — H&P
"    [facility] Medicine Services  HISTORY AND PHYSICAL    Date of Admission: 7/12/2017     Primary Care Physician: SHAHNAZ Carl    Subjective     Chief Complaint:  \"I've been having chest pressure today.\"    History of Present Illness:  The patient is a 51-year-old  female who presents with a chief complaint of chest pain.  She describes her chest pain as a pressure sensation which is located substernally.  Her chest pain does not radiate, however, she relates associated left arm numbness extending from the shoulder to her fingers. Her chest pain tends to last for 1-2 minutes and then resolves.  She is not able to identify any precipitating factors and relates that the chest pain often occurs at rest.  Onset of her symptoms is somewhat uncertain.  She relates that chest pain started approximately two months ago and recurs daily.  She also relates occasional episodes of chest palpitations associated with exertion.  NTG does not seem to help her chest pain.  Otherwise she relates no other complaints.  She has had no fevers, chills, or sweats.  She has had no shortness of shortness of breath, lower extremity edema, orthopnea, cough, wheezing, or hemoptysis.      In the ED, an initial EKG demonstrated normal sinus rhythm with a rate of 68 bpm with T-wave inversion noted in leads V1 through V4.  A follow-up EKG demonstrated a sinus rhythm of 77 bpm, however, the second study demonstrated a left bundle branch block.  She had an EKG in 3/28/2017 which also demonstrated a LBBB.  The significance of this change is not certain at this point given her history of LBBB.    Review of Systems   Constitutional: Negative for chills, diaphoresis and fever.   HENT: Negative for congestion, rhinorrhea and sore throat.    Eyes: Negative for pain, discharge and redness.   Respiratory: Positive for chest tightness. Negative for cough, shortness of breath, wheezing and stridor.  "   Cardiovascular: Positive for chest pain. Negative for palpitations and leg swelling.   Gastrointestinal: Negative for abdominal distention, abdominal pain, constipation, diarrhea, nausea and vomiting.   Genitourinary: Negative for dysuria, frequency, hematuria and urgency.   Musculoskeletal: Positive for back pain. Negative for arthralgias and myalgias.   Skin: Negative for pallor and rash.   Allergic/Immunologic: Negative for immunocompromised state.   Neurological: Positive for numbness. Negative for dizziness, seizures, syncope, facial asymmetry, speech difficulty and headaches.   Psychiatric/Behavioral: Negative for agitation, behavioral problems, confusion and hallucinations. The patient is not nervous/anxious.       Past Medical History:   Past Medical History:   Diagnosis Date   • Blurred vision    • Chronic back pain    • Colon polyp    • COPD (chronic obstructive pulmonary disease)    • Fatty liver    • GERD (gastroesophageal reflux disease)    • Hypercholesterolemia    • Hyperlipidemia    • Hypertension    • Insomnia    • Neuropathy     BILAT FEET   • On home oxygen therapy     OXYGEN AT 2L PER NC AT HS   • Parkinson disease    • Restless leg    • Seizures      Past Surgical History:  Past Surgical History:   Procedure Laterality Date   • BREAST BIOPSY      right breast   • CATARACT EXTRACTION W/ INTRAOCULAR LENS IMPLANT Right 4/4/2017    Procedure: CATARACT PHACO EXTRACTION WITH INTRAOCULAR LENS IMPLANT, RIGHT;  Surgeon: Huan Lynn MD;  Location: [facility];  Service:    • EYE SURGERY      TOOK FILM OFF EYE   • HYSTERECTOMY       Social History: She is a resident [facility].  She is .  She has a son and two daughters in good health.  She is not employed.  She relates being disabled.  She completed the 11th grade of high school.  She relates a history of smoking, but quit smoking 8 years ago.  She denies any history of alcohol or drug use.  She is Confucianism.  She has no recent history of  travel outside this region.    She is a full code and she designates her  Asael to serve as a surrogate for healthcare matters should such become necessary.    Family History: She had a brother and 8 sisters.  Her brother is now  due to heart attack.  She has a sister  due to murder.  She has a second sister  due to respiratory failure associated with heart disease and diabetes.  She has a sister with lupus.  Most of her siblings have diabetes.  Her father is  due to gastric cancer.  Her mother is  due to stroke.    Allergies:  Allergies   Allergen Reactions   • Oxycodone-Acetaminophen Nausea Only       Medications:  Prior to Admission medications    Medication Sig Start Date End Date Taking? Authorizing Provider   albuterol (VENTOLIN HFA) 108 (90 BASE) MCG/ACT inhaler Inhale 2 puffs Every 6 (Six) Hours As Needed for Shortness of Air.   Yes Historical Provider, MD   amitriptyline (ELAVIL) 10 MG tablet Take 10 mg by mouth Every Night.   Yes Historical Provider, MD   Calcium Carb-Cholecalciferol (CALCIUM + D3 PO) Take 600 mg by mouth 2 (Two) Times a Day.   Yes Historical Provider, MD   cholecalciferol (VITAMIN D3) 1000 UNITS tablet Take 1,000 Units by mouth 2 (Two) Times a Day.   Yes Historical Provider, MD   diazePAM (VALIUM) 10 MG tablet Take 5 mg by mouth daily.   Yes Historical Provider, MD   gemfibrozil (LOPID) 600 MG tablet Take 600 mg by mouth daily.   Yes Historical Provider, MD   glimepiride (AMARYL) 2 MG tablet Take 2 mg by mouth Every Morning Before Breakfast.   Yes Historical Provider, MD   HYDROcodone-acetaminophen (NORCO) 7.5-325 MG per tablet Take 1 tablet by mouth Every 8 (Eight) Hours As Needed for Moderate Pain (4-6) (BACK PAIN).   Yes Historical Provider, MD   lisinopril (PRINIVIL,ZESTRIL) 20 MG tablet TAKE 1 PILL DAILY 5/7/15  Yes Historical Provider, MD   naproxen (NAPROSYN) 500 MG tablet Take 1 tablet by mouth 2 (Two) Times a Day As Needed for mild  "pain (1-3).  Patient taking differently: Take 500 mg by mouth 2 (Two) Times a Day With Meals. 12/14/16  Yes SHAHNAZ Sexton   omeprazole (priLOSEC) 40 MG capsule Take 40 mg by mouth daily.   Yes Historical Provider, MD   rOPINIRole (REQUIP) 0.5 MG tablet Take 0.5 mg by mouth Every Night. Take 1 hour before bedtime.   Yes Historical Provider, MD   vitamin B-12 (CYANOCOBALAMIN) 1000 MCG tablet Take 1,000 mcg by mouth daily.   Yes Historical Provider, MD   vitamin E 100 UNIT capsule Take 1,000 Units by mouth daily. 2 pills daily   Yes Historical Provider, MD   gabapentin (NEURONTIN) 400 MG capsule Take 400 mg by mouth 2 (Two) Times a Day.    Historical Provider, MD       Objective     Vital Signs: /95 (BP Location: Left arm, Patient Position: Sitting) Comment: Nurse notified.  Pulse 70  Temp 97 °F (36.1 °C) (Oral)   Resp 19  Ht 64\" (162.6 cm)  Wt 289 lb 12.8 oz (131 kg)  SpO2 99%  BMI 49.74 kg/m2     Physical Exam   Constitutional: She is oriented to person, place, and time. She appears well-developed and well-nourished. No distress.   HENT:   Head: Normocephalic and atraumatic.   Nose: Nose normal.   Eyes: Right eye exhibits no discharge. Left eye exhibits no discharge. No scleral icterus.   Neck: Neck supple. No JVD present.   Cardiovascular: Normal rate, regular rhythm and normal heart sounds.  Exam reveals no gallop and no friction rub.    No murmur heard.  Pulmonary/Chest: Effort normal and breath sounds normal. No stridor. No respiratory distress. She has no wheezes. She has no rales.   Abdominal: Soft. Bowel sounds are normal. She exhibits no distension. There is no tenderness. There is no guarding.   Musculoskeletal: Normal range of motion. She exhibits no edema, tenderness or deformity.   Neurological: She is alert and oriented to person, place, and time. No cranial nerve deficit. She exhibits normal muscle tone. Coordination normal.   Skin: Skin is warm and dry. No rash noted. She is " not diaphoretic. No erythema. No pallor.   Psychiatric: She has a normal mood and affect. Her behavior is normal. Judgment and thought content normal.     Results Reviewed:  Lab Results (last 24 hours)     Procedure Component Value Units Date/Time    Troponin [[id number]]  (Normal) Collected:  07/12/17 1956    Specimen:  Blood Updated:  07/12/17 2026     Troponin I <0.012 ng/mL     Comprehensive Metabolic Panel [[id number]] Collected:  07/12/17 1956    Specimen:  Blood Updated:  07/12/17 2036     Glucose 78 mg/dL      BUN 10 mg/dL      Creatinine 0.70 mg/dL      Sodium 142 mmol/L      Potassium 4.5 mmol/L      Chloride 106 mmol/L      CO2 25.0 mmol/L      Calcium 9.6 mg/dL      Total Protein 8.2 g/dL      Albumin 4.50 g/dL      ALT (SGPT) 41 U/L      AST (SGOT) 30 U/L      Alkaline Phosphatase 79 U/L      Total Bilirubin 0.5 mg/dL      eGFR Non African Amer 88 mL/min/1.73      Globulin 3.7 gm/dL      A/G Ratio 1.2 g/dL      BUN/Creatinine Ratio 14.3     Anion Gap 11.0 mmol/L     [place] Draw [[id number]] Collected:  07/12/17 1956    Specimen:  Blood Updated:  07/12/17 2102    Narrative:       The following orders were created for panel order [place] Draw.  Procedure                               Abnormality         Status                     ---------                               -----------         ------                     Light Blue Top[[id number]]                                    Final result               Green Top (Gel)[[id number]]                                   Final result               Lavender Top[[id number]]                                      Final result               Red Top[[id number]]                                           Final result                 Please view results for these tests on the individual orders.    Light Blue Top [[id number]] Collected:  07/12/17 1956    Specimen:  Blood Updated:  07/12/17 2102     Extra Tube hold for add-on      Auto resulted       Green Top (Gel) [[id number]] Collected:   07/12/17 1956    Specimen:  Blood Updated:  07/12/17 2102     Extra Tube Hold for add-ons.      Auto resulted.       Lavender Top [[id number]] Collected:  07/12/17 1956    Specimen:  Blood Updated:  07/12/17 2102     Extra Tube hold for add-on      Auto resulted       Red Top [[id number]] Collected:  07/12/17 1956    Specimen:  Blood Updated:  07/12/17 2102     Extra Tube Hold for add-ons.      Auto resulted.       CBC & Differential [[id number]] Collected:  07/12/17 1956    Specimen:  Blood Updated:  07/12/17 2104    Narrative:       The following orders were created for panel order CBC & Differential.  Procedure                               Abnormality         Status                     ---------                               -----------         ------                     CBC Auto Differential[[id number]]         Abnormal            Final result                 Please view results for these tests on the individual orders.    CBC Auto Differential [[id number]]  (Abnormal) Collected:  07/12/17 1956    Specimen:  Blood Updated:  07/12/17 2104     WBC 4.62 (L) 10*3/mm3      RBC 4.16 (L) 10*6/mm3      Hemoglobin 12.0 g/dL      Hematocrit 36.8 (L) %      MCV 88.5 fL      MCH 28.8 pg      MCHC 32.6 (L) g/dL      RDW 13.0 %      RDW-SD 42.1 fl      MPV 11.4 fL      Platelets 283 10*3/mm3      Neutrophil % 46.0 %      Lymphocyte % 43.7 %      Monocyte % 6.5 %      Eosinophil % 3.0 %      Basophil % 0.4 %      Immature Grans % 0.4 %      Neutrophils, Absolute 2.12 10*3/mm3      Lymphocytes, Absolute 2.02 10*3/mm3      Monocytes, Absolute 0.30 10*3/mm3      Eosinophils, Absolute 0.14 10*3/mm3      Basophils, Absolute 0.02 10*3/mm3      Immature Grans, Absolute 0.02 10*3/mm3     Troponin [[id number]]  (Normal) Collected:  07/12/17 2152    Specimen:  Blood Updated:  07/12/17 2224     Troponin I <0.012 ng/mL     D-dimer, Quantitative [[id number]]  (Normal) Collected:  07/12/17 1956    Specimen:  Blood Updated:  07/13/17 0031     D-Dimer,  Quantitative 0.28 mg/L (FEU)     Narrative:       Reference Range is 0-0.50 mg/L FEU. However, results <0.50 mg/L FEU tends to rule out DVT or PE. Results >0.50 mg/L FEU are not useful in predicting absence or presence of DVT or PE.    Urinalysis With / Culture If Indicated [[id number]]  (Normal) Collected:  07/13/17 0157    Specimen:  Urine from Urine, Clean Catch Updated:  07/13/17 0213     Color, UA Yellow     Appearance, UA Clear     pH, UA 5.5     Specific Gravity, UA <=1.005     Glucose, UA Negative     Ketones, UA Negative     Bilirubin, UA Negative     Blood, UA Negative     Protein, UA Negative     Leuk Esterase, UA Negative     Nitrite, UA Negative     Urobilinogen, UA 0.2 E.U./dL    Narrative:       Urine microscopic not indicated.        Imaging Results (last 24 hours)     Procedure Component Value Units Date/Time    XR Chest 2 View [[id number]] Collected:  07/12/17 2033     Updated:  07/12/17 2045    Narrative:       EXAMINATION: XR CHEST 2 VW- 7/12/2017 8:33 PM CDT     HISTORY: Chest pain.     REPORT: Comparison is made with the study from 10/10/2016.     The lungs are clear, heart size is normal. There is a nodular density  projecting over the left upper chest which appears to be related to a  healed rib fracture which was not seen on the previous study. No other  changes seen.       Impression:       No acute abnormality, the nodular density over the left  upper chest appears to be related to a healed rib fracture.  This report was finalized on 07/12/2017 20:42 by Dr. Lamine Bernstein MD.    CT Head Without Contrast [[id number]] Collected:  07/12/17 2116     Updated:  07/12/17 2121    Narrative:       EXAMINATION: CT HEAD WO CONTRAST- 7/12/2017 9:16 PM CDT     HISTORY: Left upper extremity numbness.     DOSE: 576 mGycm (Automatic exposure control technique was implemented in  an effort to keep the radiation dose as low as possible without  compromising image quality)     REPORT:   Multiple axial images of  the head were obtained without intravenous  contrast. Reconstructed coronal and sagittal images are also reviewed.  Comparison is made with the study from 10/10/2016. No evidence of  intracranial hemorrhage, mass or mass-effect is identified. The  ventricles and basal cisterns appear within normal limits. Bone windows  show no skull fracture.       Impression:       Impression: Negative unenhanced CT of the head.               This report was finalized on 07/12/2017 21:18 by Dr. Lamine Bernstein MD.        I have personally reviewed and interpreted the radiology studies and ECG obtained at time of admission.     Assessment / Plan     Assessment & Plan    Impression:  Atypical chest pain  HTN  HLD  DM II  Seizure disorder  Restless legs syndrome  Chronic pain syndrome  Anxiety disorder  Chronic opiate use  Chronic benzodiazepine use  Obesity    Plan:  The patient will be admitted for further evaluation and treatment.  Her multiple admitting diagnoses are as noted above.  Her condition at this time is judged to be stable.  She will be placed on telemetry.    I have asked the nursing staff to obtain vital signs per protocol.  She will be confined to bedrest with bathroom privileges with assistance.  Her allergies are as noted above.  I have asked the nursing staff to monitor input and output.  Daily weights are to be obtained.  She will be maintained on an ADA diet.  IV fluids will be saline locked.  Oxygen will be used as needed to maintain her O2 saturations greater than 92%.  She is a full code.  Fall precautions will be instituted.  I will consult the cardiology service.    Initial admitting medications will include Elavil, aspirin, Lovenox, Glucotrol, Zestril, Protonix, Requip, and vitamin B12.  She will also be placed on sliding scale insulin.  Please refer to the medical record for additional particulars regarding medications.    I will obtain follow-up laboratory studies in the morning including a TSH, lipid  panel, and Hgb A1c.    I will continue to follow the patient closely through the night pending the return of the hospitalist team in the morning.  The nursing staff may call should they have any questions or concerns.  Please refer to the medical record for additional information, orders, and/or comments.      Ramiro Araujo MD   07/13/17   2:29 AM

## 2017-07-13 NOTE — ED PROVIDER NOTES
Subjective   History of Present Illness  51-year-old female presents with chief complaint of chest pain 2 hours prior to arrival.  She was on a boat with her  when she began experiencing chest tightness and heaviness in her chest.  She began feeling tired and diaphoretic and experienced left arm numbness.  The patient reports she has been having left arm numbness on and off for the past month.  Patient also reports she has intermittent chest pain.  She has been worked up for chest pain before results have been unremarkable.  The patient reports she has a abnormal EKG usually.  Denies any recent illnesses or nausea vomiting diarrhea shortness of breath.  The patient has not taken anything for her chest tightness.  Review of Systems   All other systems reviewed and are negative.      Past Medical History:   Diagnosis Date   • Blurred vision    • Chronic back pain    • Colon polyp    • COPD (chronic obstructive pulmonary disease)    • Diabetes mellitus    • Fatty liver    • GERD (gastroesophageal reflux disease)    • Hypercholesterolemia    • Hyperlipidemia    • Hypertension    • Insomnia    • Neuropathy     BILAT FEET   • On home oxygen therapy     OXYGEN AT 2L PER NC AT HS   • Parkinson disease    • Restless leg    • Seizures        Allergies   Allergen Reactions   • Oxycodone-Acetaminophen Nausea Only       Past Surgical History:   Procedure Laterality Date   • BREAST BIOPSY      right breast   • CARDIAC CATHETERIZATION     • CATARACT EXTRACTION W/ INTRAOCULAR LENS IMPLANT Right 4/4/2017    Procedure: CATARACT PHACO EXTRACTION WITH INTRAOCULAR LENS IMPLANT, RIGHT;  Surgeon: Huan Lynn MD;  Location: [facility];  Service:    • EYE SURGERY      TOOK FILM OFF EYE   • HYSTERECTOMY         History reviewed. No pertinent family history.    Social History     Social History   • Marital status:      Spouse name: N/A   • Number of children: N/A   • Years of education: N/A     Social History Main Topics   •  Smoking status: Former Smoker     Quit date: 12/14/2007   • Smokeless tobacco: Never Used   • Alcohol use No   • Drug use: No   • Sexual activity: Defer     Other Topics Concern   • None     Social History Narrative           Objective   Physical Exam   Constitutional: She is oriented to person, place, and time. She appears well-developed and well-nourished.   HENT:   Head: Normocephalic and atraumatic.   Eyes: Conjunctivae and EOM are normal. Pupils are equal, round, and reactive to light.   Neck: Normal range of motion. Neck supple.   Cardiovascular: Normal rate and regular rhythm.    Pulmonary/Chest: Effort normal and breath sounds normal.   Abdominal: Soft.   Neurological: She is alert and oriented to person, place, and time.   Skin: Skin is warm and dry.   Psychiatric: She has a normal mood and affect. Her behavior is normal.   Nursing note and vitals reviewed.      Procedures         ED Course  ED Course                  MDM  Number of Diagnoses or Management Options  Diagnosis management comments: Patient to be admitted due to ongoing chest pain after aspirin and nitroglycerin.  Admitting to Dr. Araujo.       Amount and/or Complexity of Data Reviewed  Clinical lab tests: ordered and reviewed  Tests in the medicine section of CPT®: ordered and reviewed  Decide to obtain previous medical records or to obtain history from someone other than the patient: yes    Risk of Complications, Morbidity, and/or Mortality  Presenting problems: moderate  Diagnostic procedures: moderate  Management options: moderate    Patient Progress  Patient progress: improved      Final diagnoses:   Chest pain, unspecified type            Tien Pope PA-C  07/13/17 1222       Tien Pope PA-C  09/24/17 0220       Tien Pope PA-C  09/24/17 0221

## 2017-07-13 NOTE — TELEPHONE ENCOUNTER
Mrs. Colby had had 3 no shows with no return calls. I had called her on the second no show and left a message saying that if no showed again, we would d/c her. She no showed, so I discharged the chart.   She called yesterday afternoon to say she  Had been sick and did wish to continue.   I called today and left another message saying that we would continue to see her if she wished and I could delete the d/c summary but we would need to ensure that she would not no show again.

## 2017-07-13 NOTE — DISCHARGE SUMMARY
"    [facility] Medicine Services  DISCHARGE SUMMARY       Date of Admission: 2017  Date of Discharge:  2017  Primary Care Physician: SHAHNAZ Carl    Presenting Problem/History of Present Illness:  Chest pain [R07.9]     Final Discharge Diagnoses:  Hospital Problem List     Chest pain      1. Atypical Chest pain  2. Hypertension  3. Hyperlipidemia  4. Diabetes Mellitus type II, A1c 6.6  5. Seizure disorder  6. Restless legs syndrome  7. Anxiety disorder  8. Chronic opiate use  9. Chronic benzodiazepine use  10. Obesity     Consults: None    Procedures Performed:   Shirley Colby   Echocardiogram stress test and limited echocardiogram with contrast   Order# [id number]    Reading physician: Lamine Mcgrath MD   Ordering physician: SHAHNAZ Gates   Study date: 17         Patient Information      Patient Name MRN Sex  (Age)     Shirley Colby [id number] Female 1965 (51 y.o.)       Admission Information      Admission Date/Time Discharge Date/Time Room/Bed     17  1943  433/1       Patient Height & Weight      Height Weight BSA (Calculated - sq m) BMI (kg/m2) Pulse     64\" (162.6 cm) 289 lb 12.8 oz (131 kg) 2.29 sq meters 49.85 70       Patient Vitals      BP Pulse     117/71 70       Reason For Exam      Chest Pain; Atypical Chest Pain       Cardiac History      Diagnosis Date Comment     Diabetes mellitus       Hypercholesterolemia       Hyperlipidemia       Hypertension         Social History      Tobacco Use      Former Smoker; Quit 2007.     Smokeless Tobacco: Never used smokeless tobacco.              Family Cardiac History as of 2017      No family history on file.       Interpretation Summary      Low risk for ischemia       Study Description      DSE with continuous EKG monitoring. Verbal consent was obtained from the patient to use Definity contrast in order to optimize the study. The use of Definity was indicated as two or " more contiguous segments of the left ventricular endocardial border were unable to be adequately visualized by standard imaging methods. 1.3 mL of mechanically activated Definity was mixed with 8.7 mL of normal saline. A total of 10 mL of the resulting Definity solution was administered, and the remaining contrast was wasted and discarded.   No adverse reaction to contrast was noted.     Pertinent Test Results:   Lab Results (last 24 hours)     Procedure Component Value Units Date/Time    Troponin [[id number]]  (Normal) Collected:  07/12/17 1956    Specimen:  Blood Updated:  07/12/17 2026     Troponin I <0.012 ng/mL     Comprehensive Metabolic Panel [[id number]] Collected:  07/12/17 1956    Specimen:  Blood Updated:  07/12/17 2036     Glucose 78 mg/dL      BUN 10 mg/dL      Creatinine 0.70 mg/dL      Sodium 142 mmol/L      Potassium 4.5 mmol/L      Chloride 106 mmol/L      CO2 25.0 mmol/L      Calcium 9.6 mg/dL      Total Protein 8.2 g/dL      Albumin 4.50 g/dL      ALT (SGPT) 41 U/L      AST (SGOT) 30 U/L      Alkaline Phosphatase 79 U/L      Total Bilirubin 0.5 mg/dL      eGFR Non African Amer 88 mL/min/1.73      Globulin 3.7 gm/dL      A/G Ratio 1.2 g/dL      BUN/Creatinine Ratio 14.3     Anion Gap 11.0 mmol/L     [place] Draw [[id number]] Collected:  07/12/17 1956    Specimen:  Blood Updated:  07/12/17 2102    Narrative:       The following orders were created for panel order [place] Draw.  Procedure                               Abnormality         Status                     ---------                               -----------         ------                     Light Blue Top[[id number]]                                    Final result               Green Top (Gel)[[id number]]                                   Final result               Lavender Top[[id number]]                                      Final result               Red Top[[id number]]                                           Final result                 Please view  results for these tests on the individual orders.    Light Blue Top [[id number]] Collected:  07/12/17 1956    Specimen:  Blood Updated:  07/12/17 2102     Extra Tube hold for add-on      Auto resulted       Green Top (Gel) [[id number]] Collected:  07/12/17 1956    Specimen:  Blood Updated:  07/12/17 2102     Extra Tube Hold for add-ons.      Auto resulted.       Lavender Top [[id number]] Collected:  07/12/17 1956    Specimen:  Blood Updated:  07/12/17 2102     Extra Tube hold for add-on      Auto resulted       Red Top [[id number]] Collected:  07/12/17 1956    Specimen:  Blood Updated:  07/12/17 2102     Extra Tube Hold for add-ons.      Auto resulted.       CBC & Differential [[id number]] Collected:  07/12/17 1956    Specimen:  Blood Updated:  07/12/17 2104    Narrative:       The following orders were created for panel order CBC & Differential.  Procedure                               Abnormality         Status                     ---------                               -----------         ------                     CBC Auto Differential[[id number]]         Abnormal            Final result                 Please view results for these tests on the individual orders.    CBC Auto Differential [[id number]]  (Abnormal) Collected:  07/12/17 1956    Specimen:  Blood Updated:  07/12/17 2104     WBC 4.62 (L) 10*3/mm3      RBC 4.16 (L) 10*6/mm3      Hemoglobin 12.0 g/dL      Hematocrit 36.8 (L) %      MCV 88.5 fL      MCH 28.8 pg      MCHC 32.6 (L) g/dL      RDW 13.0 %      RDW-SD 42.1 fl      MPV 11.4 fL      Platelets 283 10*3/mm3      Neutrophil % 46.0 %      Lymphocyte % 43.7 %      Monocyte % 6.5 %      Eosinophil % 3.0 %      Basophil % 0.4 %      Immature Grans % 0.4 %      Neutrophils, Absolute 2.12 10*3/mm3      Lymphocytes, Absolute 2.02 10*3/mm3      Monocytes, Absolute 0.30 10*3/mm3      Eosinophils, Absolute 0.14 10*3/mm3      Basophils, Absolute 0.02 10*3/mm3      Immature Grans, Absolute 0.02 10*3/mm3     Troponin  [[id number]]  (Normal) Collected:  07/12/17 2152    Specimen:  Blood Updated:  07/12/17 2224     Troponin I <0.012 ng/mL     D-dimer, Quantitative [[id number]]  (Normal) Collected:  07/12/17 1956    Specimen:  Blood Updated:  07/13/17 0031     D-Dimer, Quantitative 0.28 mg/L (FEU)     Narrative:       Reference Range is 0-0.50 mg/L FEU. However, results <0.50 mg/L FEU tends to rule out DVT or PE. Results >0.50 mg/L FEU are not useful in predicting absence or presence of DVT or PE.    Urinalysis With / Culture If Indicated [[id number]]  (Normal) Collected:  07/13/17 0157    Specimen:  Urine from Urine, Clean Catch Updated:  07/13/17 0213     Color, UA Yellow     Appearance, UA Clear     pH, UA 5.5     Specific Gravity, UA <=1.005     Glucose, UA Negative     Ketones, UA Negative     Bilirubin, UA Negative     Blood, UA Negative     Protein, UA Negative     Leuk Esterase, UA Negative     Nitrite, UA Negative     Urobilinogen, UA 0.2 E.U./dL    Narrative:       Urine microscopic not indicated.    Magnesium [[id number]]  (Normal) Collected:  07/13/17 0233    Specimen:  Blood Updated:  07/13/17 0343     Magnesium 1.9 mg/dL     Lipid Panel [[id number]]  (Abnormal) Collected:  07/13/17 0233    Specimen:  Blood Updated:  07/13/17 0354     Total Cholesterol 197 mg/dL      Triglycerides 290 (H) mg/dL      HDL Cholesterol 35 (L) mg/dL      LDL Cholesterol  114 (H) mg/dL      LDL/HDL Ratio 2.97    Troponin [[id number]]  (Normal) Collected:  07/13/17 0233    Specimen:  Blood Updated:  07/13/17 0355     Troponin I <0.012 ng/mL     Hemoglobin A1c [[id number]] Collected:  07/13/17 0233    Specimen:  Blood Updated:  07/13/17 0410     Hemoglobin A1C 6.6 %     Narrative:       Less than 6.0           Non-Diabetic Range  6.0-7.0                 ADA Therapeutic Target  Greater than 7.0        Action Suggested    TSH [[id number]]  (Abnormal) Collected:  07/13/17 0233    Specimen:  Blood Updated:  07/13/17 0417     TSH 4.980 (H) mIU/mL      Troponin [[id number]]  (Normal) Collected:  07/13/17 0614    Specimen:  Blood Updated:  07/13/17 0652     Troponin I <0.012 ng/mL     POC Glucose Fingerstick [[id number]]  (Abnormal) Collected:  07/13/17 0737    Specimen:  Blood Updated:  07/13/17 0759     Glucose 138 (H) mg/dL       : [id number] Sloane KaleeMeter ID: [id number]       T4, Free [[id number]]  (Normal) Collected:  07/13/17 0233    Specimen:  Blood Updated:  07/13/17 0855     Free T4 0.80 ng/dL     T3, Free [[id number]]  (Normal) Collected:  07/13/17 0233    Specimen:  Blood Updated:  07/13/17 0855     T3, Free 3.45 pg/mL     POC Glucose Fingerstick [[id number]]  (Normal) Collected:  07/13/17 1216    Specimen:  Blood Updated:  07/13/17 1239     Glucose 120 mg/dL       : [id number] Effector TherapeuticseeMeter ID: [id number]       Troponin [[id number]]  (Normal) Collected:  07/13/17 1159    Specimen:  Blood Updated:  07/13/17 1250     Troponin I <0.012 ng/mL         Imaging Results (last 24 hours)     Procedure Component Value Units Date/Time    XR Chest 2 View [[id number]] Collected:  07/12/17 2033     Updated:  07/12/17 2045    Narrative:       EXAMINATION: XR CHEST 2 VW- 7/12/2017 8:33 PM CDT     HISTORY: Chest pain.     REPORT: Comparison is made with the study from 10/10/2016.     The lungs are clear, heart size is normal. There is a nodular density  projecting over the left upper chest which appears to be related to a  healed rib fracture which was not seen on the previous study. No other  changes seen.       Impression:       No acute abnormality, the nodular density over the left  upper chest appears to be related to a healed rib fracture.  This report was finalized on 07/12/2017 20:42 by Dr. Lamine Bernstein MD.    CT Head Without Contrast [[id number]] Collected:  07/12/17 2116     Updated:  07/12/17 2121    Narrative:       EXAMINATION: CT HEAD WO CONTRAST- 7/12/2017 9:16 PM CDT     HISTORY: Left upper extremity numbness.     DOSE: 576 mGycm  "(Automatic exposure control technique was implemented in  an effort to keep the radiation dose as low as possible without  compromising image quality)     REPORT:   Multiple axial images of the head were obtained without intravenous  contrast. Reconstructed coronal and sagittal images are also reviewed.  Comparison is made with the study from 10/10/2016. No evidence of  intracranial hemorrhage, mass or mass-effect is identified. The  ventricles and basal cisterns appear within normal limits. Bone windows  show no skull fracture.       Impression:       Impression: Negative unenhanced CT of the head.               This report was finalized on 07/12/2017 21:18 by Dr. Lamine Bernstein MD.        Hospital Course:  The patient is a 51 y.o. female who presented to [facility] with chest pain.  She stated on admission that she has been having intermittent chest pain that usually occurs at rest for the past couple months.  She states that nitroglycerin has not helped the pain.  She is obese, has hyperlipidemia and diabetes.  Therefore she has risk factors for heart disease.  She had negative troponins and was taken down this morning for a dobutamine stress echo this morning.  It was read as low risk for ischemia.  She has had no EKG changes on continuous telemetry and is currently having no chest pain.  She will be discharged today to follow up with Dr. Mateus Self to establish care.  She was very vocal about her previous PCP and did not want to go back to him.  She will also be started on Lipitor for her dyslipidemia and we will stop the lopid.  She is stable and in good condition for discharge.     Condition on Discharge:  Stable     Physical Exam on Discharge:  /71 (BP Location: Right arm, Patient Position: Sitting)  Pulse 70  Temp 97.5 °F (36.4 °C) (Temporal Artery )   Resp 18  Ht 64\" (162.6 cm)  Wt 289 lb 12.8 oz (131 kg)  SpO2 96%  BMI 49.74 kg/m2     Physical Exam   Constitutional: She is " oriented to person, place, and time. She appears well-developed and well-nourished.   HENT:   Head: Normocephalic and atraumatic.   Eyes: Conjunctivae and EOM are normal. Pupils are equal, round, and reactive to light.   Neck: Neck supple. No JVD present. No thyromegaly present.   Cardiovascular: Normal rate, regular rhythm, normal heart sounds and intact distal pulses.  Exam reveals no gallop and no friction rub.    No murmur heard.  Pulmonary/Chest: Effort normal and breath sounds normal. No respiratory distress. She has no wheezes. She has no rales. She exhibits no tenderness.   Abdominal: Soft. Bowel sounds are normal. She exhibits no distension. There is no tenderness. There is no rebound and no guarding.   Musculoskeletal: Normal range of motion. She exhibits no edema, tenderness or deformity.   Lymphadenopathy:     She has no cervical adenopathy.   Neurological: She is alert and oriented to person, place, and time. She displays normal reflexes. No cranial nerve deficit. She exhibits normal muscle tone.   Skin: Skin is warm and dry. No rash noted.   Psychiatric: She has a normal mood and affect. Her behavior is normal. Judgment and thought content normal.   Vitals reviewed.    Discharge Disposition:  Home or Self Care    Discharge Medications:   Shirley Colby   Home Medication Instructions ELENA:[id number]    Printed on:07/13/17 1344   Medication Information                      albuterol (VENTOLIN HFA) 108 (90 BASE) MCG/ACT inhaler  Inhale 2 puffs Every 6 (Six) Hours As Needed for Shortness of Air.             amitriptyline (ELAVIL) 10 MG tablet  Take 10 mg by mouth Every Night.             atorvastatin (LIPITOR) 40 MG tablet  Take 1 tablet by mouth Every Night.             Calcium Carb-Cholecalciferol (CALCIUM + D3 PO)  Take 600 mg by mouth 2 (Two) Times a Day.             cholecalciferol (VITAMIN D3) 1000 UNITS tablet  Take 1,000 Units by mouth 2 (Two) Times a Day.             diazePAM (VALIUM) 10 MG  tablet  Take 5 mg by mouth daily.             gabapentin (NEURONTIN) 400 MG capsule  Take 400 mg by mouth 2 (Two) Times a Day.             glimepiride (AMARYL) 2 MG tablet  Take 2 mg by mouth Every Morning Before Breakfast.             HYDROcodone-acetaminophen (NORCO) 7.5-325 MG per tablet  Take 1 tablet by mouth Every 8 (Eight) Hours As Needed for Moderate Pain (4-6) (BACK PAIN).             lisinopril (PRINIVIL,ZESTRIL) 20 MG tablet  TAKE 1 PILL DAILY             naproxen (NAPROSYN) 500 MG tablet  Take 1 tablet by mouth 2 (Two) Times a Day As Needed for mild pain (1-3).             omeprazole (priLOSEC) 40 MG capsule  Take 40 mg by mouth daily.             rOPINIRole (REQUIP) 0.5 MG tablet  Take 0.5 mg by mouth Every Night. Take 1 hour before bedtime.             vitamin B-12 (CYANOCOBALAMIN) 1000 MCG tablet  Take 1,000 mcg by mouth daily.             vitamin E 100 UNIT capsule  Take 1,000 Units by mouth daily. 2 pills daily               Discharge Diet:   Diet Instructions     Diet: Cardiac, Consistent Carbohydrate; Thin Liquids, No Restrictions       Discharge Diet:   Cardiac  Consistent Carbohydrate      Fluid Consistency:  Thin Liquids, No Restrictions               Activity at Discharge:   Activity Instructions     Activity as Tolerated                   Discharge Care Plan/Instructions:   1. Stop Lopid, start Lipitor     Follow-up Appointments:  1. To establish care with Dr. Self   No future appointments.    Test Results Pending at Discharge: None    SHAHNAZ Gates  07/13/17  1:44 PM    Time: 25 minutes       I personally evaluated and examined the patient in conjunction with SHAHNAZ Noel and agree with the assessment, treatment plan, and disposition of the patient as recorded by her. My history, exam, and further recommendations are: I have reviewed and agree with the plan. Guillermo.

## 2017-07-13 NOTE — PLAN OF CARE
Problem: Patient Care Overview (Adult)  Goal: Plan of Care Review  Outcome: Ongoing (interventions implemented as appropriate)    07/13/17 0448   Coping/Psychosocial Response Interventions   Plan Of Care Reviewed With patient   Patient Care Overview   Progress no change   Outcome Evaluation   Outcome Summary/Follow up Plan Admitted from ER with chest pain. Cardiac markers negative. Denies chest pain. VSS. NSR 70-80 on tele. PPP. Labs pending this AM. Will cont to monitor.         Problem: Acute Coronary Syndrome (ACS) (Adult)  Goal: Signs and Symptoms of Listed Potential Problems Will be Absent or Manageable (Acute Coronary Syndrome)  Outcome: Ongoing (interventions implemented as appropriate)

## 2017-07-18 ENCOUNTER — APPOINTMENT (OUTPATIENT)
Dept: PHYSICAL THERAPY | Facility: HOSPITAL | Age: 52
End: 2017-07-18

## 2017-08-15 ENCOUNTER — APPOINTMENT (OUTPATIENT)
Dept: CT IMAGING | Facility: HOSPITAL | Age: 52
End: 2017-08-15

## 2017-08-15 ENCOUNTER — HOSPITAL ENCOUNTER (EMERGENCY)
Facility: HOSPITAL | Age: 52
Discharge: HOME | End: 2017-08-15
Admitting: EMERGENCY MEDICINE

## 2017-08-15 VITALS
RESPIRATION RATE: 20 BRPM | DIASTOLIC BLOOD PRESSURE: 62 MMHG | BODY MASS INDEX: 48.82 KG/M2 | TEMPERATURE: 97.8 F | SYSTOLIC BLOOD PRESSURE: 117 MMHG | OXYGEN SATURATION: 97 % | HEIGHT: 65 IN | HEART RATE: 66 BPM | WEIGHT: 293 LBS

## 2017-08-15 DIAGNOSIS — R10.11 RIGHT UPPER QUADRANT ABDOMINAL PAIN: Primary | ICD-10-CM

## 2017-08-15 LAB
ALBUMIN SERPL-MCNC: 4.2 G/DL (ref 3.5–5)
ALBUMIN/GLOB SERPL: 1.2 G/DL (ref 1.1–2.5)
ALP SERPL-CCNC: 88 U/L (ref 24–120)
ALT SERPL W P-5'-P-CCNC: 49 U/L (ref 0–54)
AMYLASE SERPL-CCNC: 64 U/L (ref 30–110)
ANION GAP SERPL CALCULATED.3IONS-SCNC: 11 MMOL/L (ref 4–13)
AST SERPL-CCNC: 32 U/L (ref 7–45)
BACTERIA UR QL AUTO: ABNORMAL /HPF
BASOPHILS # BLD AUTO: 0.02 10*3/MM3 (ref 0–0.2)
BASOPHILS NFR BLD AUTO: 0.4 % (ref 0–2)
BILIRUB SERPL-MCNC: 0.5 MG/DL (ref 0.1–1)
BILIRUB UR QL STRIP: NEGATIVE
BUN BLD-MCNC: 12 MG/DL (ref 5–21)
BUN/CREAT SERPL: 16.2 (ref 7–25)
CALCIUM SPEC-SCNC: 9 MG/DL (ref 8.4–10.4)
CHLORIDE SERPL-SCNC: 108 MMOL/L (ref 98–110)
CLARITY UR: CLEAR
CO2 SERPL-SCNC: 22 MMOL/L (ref 24–31)
COLOR UR: YELLOW
CREAT BLD-MCNC: 0.74 MG/DL (ref 0.5–1.4)
DEPRECATED RDW RBC AUTO: 42.1 FL (ref 40–54)
EOSINOPHIL # BLD AUTO: 0.16 10*3/MM3 (ref 0–0.7)
EOSINOPHIL NFR BLD AUTO: 3.3 % (ref 0–4)
ERYTHROCYTE [DISTWIDTH] IN BLOOD BY AUTOMATED COUNT: 13.1 % (ref 12–15)
GFR SERPL CREATININE-BSD FRML MDRD: 82 ML/MIN/1.73
GLOBULIN UR ELPH-MCNC: 3.6 GM/DL
GLUCOSE BLD-MCNC: 79 MG/DL (ref 70–100)
GLUCOSE BLDC GLUCOMTR-MCNC: 99 MG/DL (ref 70–130)
GLUCOSE UR STRIP-MCNC: NEGATIVE MG/DL
HCT VFR BLD AUTO: 35.6 % (ref 37–47)
HGB BLD-MCNC: 11.8 G/DL (ref 12–16)
HGB UR QL STRIP.AUTO: NEGATIVE
HYALINE CASTS UR QL AUTO: ABNORMAL /LPF
IMM GRANULOCYTES # BLD: 0.01 10*3/MM3 (ref 0–0.03)
IMM GRANULOCYTES NFR BLD: 0.2 % (ref 0–5)
KETONES UR QL STRIP: NEGATIVE
LEUKOCYTE ESTERASE UR QL STRIP.AUTO: ABNORMAL
LIPASE SERPL-CCNC: 110 U/L (ref 23–203)
LYMPHOCYTES # BLD AUTO: 2.05 10*3/MM3 (ref 0.72–4.86)
LYMPHOCYTES NFR BLD AUTO: 42.8 % (ref 15–45)
MCH RBC QN AUTO: 29.1 PG (ref 28–32)
MCHC RBC AUTO-ENTMCNC: 33.1 G/DL (ref 33–36)
MCV RBC AUTO: 87.9 FL (ref 82–98)
MONOCYTES # BLD AUTO: 0.35 10*3/MM3 (ref 0.19–1.3)
MONOCYTES NFR BLD AUTO: 7.3 % (ref 4–12)
NEUTROPHILS # BLD AUTO: 2.2 10*3/MM3 (ref 1.87–8.4)
NEUTROPHILS NFR BLD AUTO: 46 % (ref 39–78)
NITRITE UR QL STRIP: NEGATIVE
PH UR STRIP.AUTO: <=5 [PH] (ref 5–8)
PLATELET # BLD AUTO: 252 10*3/MM3 (ref 130–400)
PMV BLD AUTO: 11.4 FL (ref 6–12)
POTASSIUM BLD-SCNC: 3.9 MMOL/L (ref 3.5–5.3)
PROT SERPL-MCNC: 7.8 G/DL (ref 6.3–8.7)
PROT UR QL STRIP: NEGATIVE
RBC # BLD AUTO: 4.05 10*6/MM3 (ref 4.2–5.4)
RBC # UR: ABNORMAL /HPF
REF LAB TEST METHOD: ABNORMAL
SODIUM BLD-SCNC: 141 MMOL/L (ref 135–145)
SP GR UR STRIP: 1.02 (ref 1–1.03)
SQUAMOUS #/AREA URNS HPF: ABNORMAL /HPF
UROBILINOGEN UR QL STRIP: ABNORMAL
WBC NRBC COR # BLD: 4.79 10*3/MM3 (ref 4.8–10.8)
WBC UR QL AUTO: ABNORMAL /HPF

## 2017-08-15 PROCEDURE — 0 IOPAMIDOL 61 % SOLUTION: Performed by: PHYSICIAN ASSISTANT

## 2017-08-15 PROCEDURE — 96375 TX/PRO/DX INJ NEW DRUG ADDON: CPT

## 2017-08-15 PROCEDURE — 85025 COMPLETE CBC W/AUTO DIFF WBC: CPT | Performed by: PHYSICIAN ASSISTANT

## 2017-08-15 PROCEDURE — 99283 EMERGENCY DEPT VISIT LOW MDM: CPT

## 2017-08-15 PROCEDURE — 25010000002 ONDANSETRON PER 1 MG: Performed by: EMERGENCY MEDICINE

## 2017-08-15 PROCEDURE — 87086 URINE CULTURE/COLONY COUNT: CPT | Performed by: PHYSICIAN ASSISTANT

## 2017-08-15 PROCEDURE — 83690 ASSAY OF LIPASE: CPT | Performed by: PHYSICIAN ASSISTANT

## 2017-08-15 PROCEDURE — 80053 COMPREHEN METABOLIC PANEL: CPT | Performed by: PHYSICIAN ASSISTANT

## 2017-08-15 PROCEDURE — 82962 GLUCOSE BLOOD TEST: CPT

## 2017-08-15 PROCEDURE — 82150 ASSAY OF AMYLASE: CPT | Performed by: PHYSICIAN ASSISTANT

## 2017-08-15 PROCEDURE — 25010000002 KETOROLAC TROMETHAMINE PER 15 MG: Performed by: PHYSICIAN ASSISTANT

## 2017-08-15 PROCEDURE — 96374 THER/PROPH/DIAG INJ IV PUSH: CPT

## 2017-08-15 PROCEDURE — 74177 CT ABD & PELVIS W/CONTRAST: CPT

## 2017-08-15 PROCEDURE — 81001 URINALYSIS AUTO W/SCOPE: CPT | Performed by: PHYSICIAN ASSISTANT

## 2017-08-15 PROCEDURE — 96361 HYDRATE IV INFUSION ADD-ON: CPT

## 2017-08-15 RX ORDER — KETOROLAC TROMETHAMINE 30 MG/ML
30 INJECTION, SOLUTION INTRAMUSCULAR; INTRAVENOUS ONCE
Status: COMPLETED | OUTPATIENT
Start: 2017-08-15 | End: 2017-08-15

## 2017-08-15 RX ORDER — SODIUM CHLORIDE 9 MG/ML
125 INJECTION, SOLUTION INTRAVENOUS CONTINUOUS
Status: DISCONTINUED | OUTPATIENT
Start: 2017-08-15 | End: 2017-08-16 | Stop reason: HOSPADM

## 2017-08-15 RX ORDER — KETOROLAC TROMETHAMINE 10 MG/1
10 TABLET, FILM COATED ORAL EVERY 6 HOURS PRN
Qty: 9 TABLET | Refills: 0 | Status: SHIPPED | OUTPATIENT
Start: 2017-08-15 | End: 2018-11-13

## 2017-08-15 RX ORDER — ONDANSETRON 2 MG/ML
4 INJECTION INTRAMUSCULAR; INTRAVENOUS ONCE
Status: COMPLETED | OUTPATIENT
Start: 2017-08-15 | End: 2017-08-15

## 2017-08-15 RX ADMIN — IOPAMIDOL 100 ML: 612 INJECTION, SOLUTION INTRAVENOUS at 21:06

## 2017-08-15 RX ADMIN — ONDANSETRON 4 MG: 2 INJECTION INTRAMUSCULAR; INTRAVENOUS at 21:24

## 2017-08-15 RX ADMIN — SODIUM CHLORIDE 125 ML/HR: 9 INJECTION, SOLUTION INTRAVENOUS at 20:10

## 2017-08-15 RX ADMIN — KETOROLAC TROMETHAMINE 30 MG: 30 INJECTION, SOLUTION INTRAMUSCULAR at 22:21

## 2017-08-16 NOTE — ED PROVIDER NOTES
Subjective   Patient is a 51 y.o. female presenting with abdominal pain.   Abdominal Pain   Associated symptoms: constipation and nausea    Associated symptoms: no chills, no diarrhea, no fatigue, no fever and no vomiting      Patient is a 51-year-old white female who comes in today with the chief complaint of right upper quadrant pain.  The patient states that today around 3 o'clock she first experienced this pain which she rates as 7 out of 10 in severity.  The patient states around 2 o'clock she started mowing her yard taking breaks periodically because of the heat.  Patient states that around 3 o'clock she felt increasing pain in her right upper quadrant area.  She states that she took a Lortab which is prescribed for her back pain for the pain she was feeling and that it helped relieve the pain to a 5 on severity.  Patient states that her last meal was at 9 o'clock this morning which was a santiago cheeseburger.  She states her last bowel movement was Sunday but that this is normal for her to have one every 2 or 3 days.  She states that she did not vomit but that she had a couple dry heaving spells.  She feels that she might have been dehydrated from being outside but that she has a family history of gallbladder disease and her daughter and stomach cancer in her father.  Patient completed an EGD in the 90s and it was normal.  Patient states she had a full cardiac workup for chest pain which she had about one month prior in the ER.  She denies having any pain like this before.  She admits to nausea but denies any diarrhea or fever.    Review of Systems   Constitutional: Positive for activity change and appetite change. Negative for chills, diaphoresis, fatigue, fever and unexpected weight change.   HENT: Negative.    Eyes: Negative.    Respiratory: Negative.    Cardiovascular: Negative.    Gastrointestinal: Positive for abdominal pain, constipation and nausea. Negative for abdominal distention, anal bleeding, blood  in stool, diarrhea, rectal pain and vomiting.        Patient has chronic constipation   Genitourinary: Negative.         Felt like she was peeing less often despite drinking more fluids.   Musculoskeletal: Negative.    Skin: Negative.    Neurological: Negative.    Hematological: Negative.    Psychiatric/Behavioral: Negative.        Past Medical History:   Diagnosis Date   • Blurred vision    • Chronic back pain    • Colon polyp    • COPD (chronic obstructive pulmonary disease)    • Diabetes mellitus    • Fatty liver    • GERD (gastroesophageal reflux disease)    • Hypercholesterolemia    • Hyperlipidemia    • Hypertension    • Insomnia    • Neuropathy     BILAT FEET   • On home oxygen therapy     OXYGEN AT 2L PER NC AT HS   • Parkinson disease    • Restless leg    • Seizures        Allergies   Allergen Reactions   • Oxycodone-Acetaminophen Nausea Only       Past Surgical History:   Procedure Laterality Date   • BREAST BIOPSY      right breast   • CARDIAC CATHETERIZATION     • CATARACT EXTRACTION W/ INTRAOCULAR LENS IMPLANT Right 4/4/2017    Procedure: CATARACT PHACO EXTRACTION WITH INTRAOCULAR LENS IMPLANT, RIGHT;  Surgeon: Huan Lynn MD;  Location: [facility];  Service:    • EYE SURGERY      TOOK FILM OFF EYE   • HYSTERECTOMY         History reviewed. No pertinent family history.    Social History     Social History   • Marital status:      Spouse name: N/A   • Number of children: N/A   • Years of education: N/A     Social History Main Topics   • Smoking status: Former Smoker     Quit date: 12/14/2007   • Smokeless tobacco: Never Used   • Alcohol use No   • Drug use: No   • Sexual activity: Defer     Other Topics Concern   • None     Social History Narrative       Prior to Admission medications    Medication Sig Start Date End Date Taking? Authorizing Provider   albuterol (VENTOLIN HFA) 108 (90 BASE) MCG/ACT inhaler Inhale 2 puffs Every 6 (Six) Hours As Needed for Shortness of Air.    Historical Provider,  MD   amitriptyline (ELAVIL) 10 MG tablet Take 10 mg by mouth Every Night.    Historical Provider, MD   atorvastatin (LIPITOR) 40 MG tablet Take 1 tablet by mouth Every Night. 7/13/17   SHAHNAZ Gates   Calcium Carb-Cholecalciferol (CALCIUM + D3 PO) Take 600 mg by mouth 2 (Two) Times a Day.    Historical Provider, MD   diazePAM (VALIUM) 10 MG tablet Take 5 mg by mouth daily.    Historical Provider, MD   diazePAM (VALIUM) 10 MG tablet Take  by mouth.    Nurse [facility], RN   glimepiride (AMARYL) 2 MG tablet Take 2 mg by mouth Every Morning Before Breakfast.    Historical Provider, MD   HYDROcodone-acetaminophen (NORCO) 7.5-325 MG per tablet Take 1 tablet by mouth Every 8 (Eight) Hours As Needed for Moderate Pain (4-6) (BACK PAIN).    Historical Provider, MD   lisinopril (PRINIVIL,ZESTRIL) 20 MG tablet TAKE 1 PILL DAILY 5/7/15   Historical Provider, MD   omeprazole (priLOSEC) 40 MG capsule Take 40 mg by mouth daily.    Historical Provider, MD   rOPINIRole (REQUIP) 0.5 MG tablet Take 0.5 mg by mouth Every Night. Take 1 hour before bedtime.    Historical Provider, MD   vitamin B-12 (CYANOCOBALAMIN) 1000 MCG tablet Take 1,000 mcg by mouth daily.    Historical Provider, MD   vitamin D (ERGOCALCIFEROL) 60436 units capsule capsule Take 50,000 Units by mouth 2 (Two) Times a Week.    Nurse [facility], RN   vitamin E 100 UNIT capsule Take 1,000 Units by mouth daily. 2 pills daily    Historical Provider, MD   cholecalciferol (VITAMIN D3) 1000 UNITS tablet Take 1,000 Units by mouth Daily.  8/15/17  Historical Provider, MD   gabapentin (NEURONTIN) 400 MG capsule Take 400 mg by mouth 2 (Two) Times a Day.  8/15/17  Historical Provider, MD   naproxen (NAPROSYN) 500 MG tablet Take 1 tablet by mouth 2 (Two) Times a Day As Needed for mild pain (1-3).  Patient taking differently: Take 500 mg by mouth 2 (Two) Times a Day With Meals. 12/14/16 8/15/17  SHAHNAZ Sexton       Medications   sodium chloride 0.9 %  "infusion (125 mL/hr Intravenous New Bag 8/15/17 2010)   iopamidol (ISOVUE-300) 61 % injection 100 mL (100 mL Intravenous Given 8/15/17 2106)   ondansetron (ZOFRAN) injection 4 mg (4 mg Intravenous Given 8/15/17 2124)   ketorolac (TORADOL) injection 30 mg (30 mg Intravenous Given 8/15/17 2221)       /80 (BP Location: Right arm, Patient Position: Sitting)  Pulse 72  Temp 98.5 °F (36.9 °C) (Temporal Artery )   Resp 20  Ht 65\" (165.1 cm)  Wt 297 lb (135 kg)  SpO2 98%  BMI 49.42 kg/m2      Objective   Physical Exam   Constitutional: She is oriented to person, place, and time. She appears well-developed and well-nourished. No distress.   HENT:   Head: Normocephalic and atraumatic.   Right Ear: External ear normal.   Left Ear: External ear normal.   Nose: Nose normal.   Mouth/Throat: Oropharynx is clear and moist. No oropharyngeal exudate.   Eyes: Conjunctivae and EOM are normal. Pupils are equal, round, and reactive to light. Right eye exhibits no discharge. Left eye exhibits no discharge. No scleral icterus.   Neck: Normal range of motion. Neck supple.   Cardiovascular: Normal rate, regular rhythm, normal heart sounds and intact distal pulses.  Exam reveals no gallop and no friction rub.    No murmur heard.  Pulmonary/Chest: Effort normal and breath sounds normal. No respiratory distress. She has no wheezes. She has no rales. She exhibits no tenderness.   Abdominal: Soft. Bowel sounds are normal. She exhibits no shifting dullness, no distension, no pulsatile liver, no abdominal bruit, no ascites, no pulsatile midline mass and no mass. There is no hepatosplenomegaly, splenomegaly or hepatomegaly. There is tenderness in the right upper quadrant. There is positive Rodriguez's sign. There is no rigidity, no rebound, no guarding, no CVA tenderness and no tenderness at McBurney's point. No hernia.   Positive Rodriguez sign. Marked tenderness of RUQ upon light and deep palpation. Constant dull aching pain at rest. "     Bowel sounds present in all 4 quadrants       Musculoskeletal: Normal range of motion. She exhibits no edema, tenderness or deformity.   Neurological: She is alert and oriented to person, place, and time. She has normal reflexes. She displays normal reflexes. No cranial nerve deficit. She exhibits normal muscle tone. Coordination normal.   Skin: Skin is warm and dry. No rash noted. She is not diaphoretic. No erythema. No pallor.   Psychiatric: She has a normal mood and affect. Her behavior is normal. Judgment and thought content normal.       Procedures         Lab Results (last 24 hours)     Procedure Component Value Units Date/Time    CBC & Differential [[id number]] Collected:  08/15/17 2012    Specimen:  Blood Updated:  08/15/17 2021    Narrative:       The following orders were created for panel order CBC & Differential.  Procedure                               Abnormality         Status                     ---------                               -----------         ------                     CBC Auto Differential[[id number]]        Abnormal            Final result                 Please view results for these tests on the individual orders.    Comprehensive Metabolic Panel [[id number]]  (Abnormal) Collected:  08/15/17 2012    Specimen:  Blood Updated:  08/15/17 2033     Glucose 79 mg/dL      BUN 12 mg/dL       Specimen hemolyzed. Results may be affected.        Creatinine 0.74 mg/dL      Sodium 141 mmol/L      Potassium 3.9 mmol/L       Specimen hemolyzed.  Results may be affected.        Chloride 108 mmol/L      CO2 22.0 (L) mmol/L      Calcium 9.0 mg/dL      Total Protein 7.8 g/dL      Albumin 4.20 g/dL      ALT (SGPT) 49 U/L       Specimen hemolyzed.  Results may be affected.        AST (SGOT) 32 U/L       Specimen hemolyzed.  Results may be affected.        Alkaline Phosphatase 88 U/L       Specimen hemolyzed. Results may be affected.        Total Bilirubin 0.5 mg/dL      eGFR Non African Amer 82  mL/min/1.73      Globulin 3.6 gm/dL      A/G Ratio 1.2 g/dL      BUN/Creatinine Ratio 16.2     Anion Gap 11.0 mmol/L     Amylase [[id number]]  (Normal) Collected:  08/15/17 2012    Specimen:  Blood Updated:  08/15/17 2030     Amylase 64 U/L     Lipase [[id number]]  (Normal) Collected:  08/15/17 2012    Specimen:  Blood Updated:  08/15/17 2030     Lipase 110 U/L     CBC Auto Differential [[id number]]  (Abnormal) Collected:  08/15/17 2012    Specimen:  Blood Updated:  08/15/17 2021     WBC 4.79 (L) 10*3/mm3      RBC 4.05 (L) 10*6/mm3      Hemoglobin 11.8 (L) g/dL      Hematocrit 35.6 (L) %      MCV 87.9 fL      MCH 29.1 pg      MCHC 33.1 g/dL      RDW 13.1 %      RDW-SD 42.1 fl      MPV 11.4 fL      Platelets 252 10*3/mm3      Neutrophil % 46.0 %      Lymphocyte % 42.8 %      Monocyte % 7.3 %      Eosinophil % 3.3 %      Basophil % 0.4 %      Immature Grans % 0.2 %      Neutrophils, Absolute 2.20 10*3/mm3      Lymphocytes, Absolute 2.05 10*3/mm3      Monocytes, Absolute 0.35 10*3/mm3      Eosinophils, Absolute 0.16 10*3/mm3      Basophils, Absolute 0.02 10*3/mm3      Immature Grans, Absolute 0.01 10*3/mm3     Urinalysis With / Culture If Indicated [[id number]]  (Abnormal) Collected:  08/15/17 2014    Specimen:  Urine from Urine, Clean Catch Updated:  08/15/17 2025     Color, UA Yellow     Appearance, UA Clear     pH, UA <=5.0     Specific Gravity, UA 1.019     Glucose, UA Negative     Ketones, UA Negative     Bilirubin, UA Negative     Blood, UA Negative     Protein, UA Negative     Leuk Esterase, UA Small (1+) (A)     Nitrite, UA Negative     Urobilinogen, UA 1.0 E.U./dL    Urine Culture [[id number]] Collected:  08/15/17 2014    Specimen:  Urine from Urine, Clean Catch Updated:  08/15/17 2022    Urinalysis, Microscopic Only [[id number]]  (Abnormal) Collected:  08/15/17 2014    Specimen:  Urine from Urine, Clean Catch Updated:  08/15/17 2025     RBC, UA 0-2 (A) /HPF      WBC, UA 3-5 (A) /HPF      Bacteria, UA 1+ (A) /HPF       Squamous Epithelial Cells, UA 7-12 (A) /HPF      Hyaline Casts, UA 0-2 /LPF      Methodology Automated Microscopy    POC Glucose Fingerstick [[id number]]  (Normal) Collected:  08/15/17 2018    Specimen:  Blood Updated:  08/15/17 2031     Glucose 99 mg/dL       : [id number] Sujit JaimesMeter ID: [id number]             Ct Abdomen Pelvis With Contrast    Result Date: 8/15/2017  Narrative: CT ABDOMEN AND PELVIS WITH CONTRAST 8/15/2017 8:43 PM CDT  HISTORY: Right upper quadrant pain  COMPARISON: 10/10/2016.  DLP: 1864 mGy cm  TECHNIQUE: Following the intravenous administration of contrast, helical CT tomographic images of the abdomen and pelvis were acquired. Coronal reformatted images were also provided for review.  FINDINGS: The lung bases and base of the heart are unremarkable.  LIVER: No focal liver lesion. The hepatic vasculature is patent.  BILIARY SYSTEM: The gallbladder is unremarkable. No intrahepatic or extrahepatic ductal dilatation.  PANCREAS: No focal pancreatic lesion.  SPLEEN: Unremarkable.  KIDNEYS AND ADRENALS: Bilateral kidneys and adrenal glands are unremarkable. The ureters are decompressed and normal in appearance.  RETROPERITONEUM: No mass, lymphadenopathy or hemorrhage.  GI TRACT: No evidence of obstruction or bowel wall thickening. A few scattered diverticula are noted in the sigmoid colon.  OTHER: There is no mesenteric mass, lymphadenopathy or fluid collection. The abdominopelvic vasculature is patent. The osseous structures and soft tissues demonstrate no worrisome lesions.      PELVIS: The muscle and fat planes are symmetric.. The urinary bladder is normal in appearance.      Impression: 1. No acute intra-abdominal or pelvic abnormalities identified..   This report was finalized on 08/15/2017 21:21 by Dr. Austin Kan MD.      ED Course  ED Course          MDM  Number of Diagnoses or Management Options  Right upper quadrant abdominal pain:   Diagnosis management comments: Abdominal  "pain differential includes but not limited to:  Acute cholecystitis, cholelithiasis, bilary colic, pancreatitis, gastritis (ulcer vs alcoholic vs other), small bowel obstruction, peritonitis, diverticulitis, colitis, large bowel obstruction, Crohn's, ulcerative colitis, UTI (lower or upper/pyelonephritis), intussception, pregnancy, Mittelschmerz, ovarian cyst, ovarian torsion, PID, cervicitis, endometriosis, fibroids, nephro(utero)lithiasis, mononucleosis, MI, trauma, etc...       Amount and/or Complexity of Data Reviewed  Clinical lab tests: ordered and reviewed  Tests in the radiology section of CPT®: ordered and reviewed  Tests in the medicine section of CPT®: ordered and reviewed  Decide to obtain previous medical records or to obtain history from someone other than the patient: yes    Risk of Complications, Morbidity, and/or Mortality  Presenting problems: low  Diagnostic procedures: low  Management options: low    Patient Progress  Patient progress: improved    [facility] CARDIOLOGY  [address] [place] [id number] [phone]             Shirley Colby   Echocardiogram stress test and limited echocardiogram with contrast   Order# [id number]   Reading physician: Lamine Mcgrath MD Ordering physician: SAHHNAZ Gates Study date: 17   Patient Information   Patient Name MRN Sex  (Age)   Shirley SCOTT Lasha [id number] Female 1965 (51 y.o.)   Patient Height & Weight   Height Weight BSA (Calculated - sq m) BMI (kg/m2) Pulse   64\" (162.6 cm) 289 lb 12.8 oz (131 kg) 2.29 sq meters 49.85 70   Patient Vitals   BP Pulse   117/71 70   Reason For Exam   Chest Pain; Atypical Chest Pain   Cardiac History   Diagnosis Date Comment   Diabetes mellitus     Hypercholesterolemia     Hyperlipidemia     Hypertension     Social History   Tobacco Use   Former Smoker; Quit 2007.   Smokeless Tobacco: Never used smokeless tobacco.      Family Cardiac History as of 2017   No family history " on file.   Interpretation Summary   Low risk for ischemia   Study Description   DSE with continuous EKG monitoring. Verbal consent was obtained from the patient to use Definity contrast in order to optimize the study. The use of Definity was indicated as two or more contiguous segments of the left ventricular endocardial border were unable to be adequately visualized by standard imaging methods. 1.3 mL of mechanically activated Definity was mixed with 8.7 mL of normal saline. A total of 10 mL of the resulting Definity solution was administered, and the remaining contrast was wasted and discarded.   No adverse reaction to contrast was noted.   Stress Procedure   Rest ECG  Baseline ECG of normal sinus rhythm noted. Non-specific ST-T wave changes noted. There were inverted T waves..   Stress ECG  Stress ECG rhythm of sinus tachycardia noted. Non-specific ST-T wave changes and left bundle branch block noted.   There were no arrhythmias during stress.   There were no arrhythmias during recovery.   Stress Description  A pharmacological stress test was performed using dobutamine.   The patient achieved the target heart rate.   The patient reported no symptoms during the stress test. Denied CP or SOA.   Stress Echo Findings   Ventricle Size / Description  Segment augmentation had a normal response to stress. Cavity size behaved normally in response to stress.   Stress Echo - Peak Stress Findings  Post stress images with adequate visualization of myocardium to assess for ischemia. Normal stress echo with no significant echocardiographic evidence for myocardial ischemia.       Patient has been reassessed.  She reports feeling better. She has been updated on test results.         Final diagnoses:   Right upper quadrant abdominal pain          EDWIN Young  08/15/17 [id number]

## 2017-08-17 LAB — BACTERIA SPEC AEROBE CULT: ABNORMAL

## 2017-08-28 ENCOUNTER — TELEPHONE (OUTPATIENT)
Dept: PRIMARY CARE CLINIC | Age: 52
End: 2017-08-28

## 2017-08-28 ENCOUNTER — OFFICE VISIT (OUTPATIENT)
Dept: PRIMARY CARE CLINIC | Age: 52
End: 2017-08-28
Payer: MEDICAID

## 2017-08-28 VITALS
HEIGHT: 65 IN | BODY MASS INDEX: 48.82 KG/M2 | TEMPERATURE: 97.6 F | OXYGEN SATURATION: 97 % | HEART RATE: 85 BPM | WEIGHT: 293 LBS | DIASTOLIC BLOOD PRESSURE: 82 MMHG | SYSTOLIC BLOOD PRESSURE: 122 MMHG

## 2017-08-28 DIAGNOSIS — R10.84 GENERALIZED ABDOMINAL PAIN: Primary | ICD-10-CM

## 2017-08-28 DIAGNOSIS — E11.9 TYPE 2 DIABETES MELLITUS WITHOUT COMPLICATION, WITHOUT LONG-TERM CURRENT USE OF INSULIN (HCC): ICD-10-CM

## 2017-08-28 DIAGNOSIS — K21.9 GASTROESOPHAGEAL REFLUX DISEASE, ESOPHAGITIS PRESENCE NOT SPECIFIED: ICD-10-CM

## 2017-08-28 PROCEDURE — 99215 OFFICE O/P EST HI 40 MIN: CPT | Performed by: INTERNAL MEDICINE

## 2017-08-28 RX ORDER — HYOSCYAMINE SULFATE 0.125 MG
0.12 TABLET ORAL EVERY 4 HOURS PRN
COMMUNITY
Start: 2017-08-16 | End: 2018-01-02

## 2017-08-28 RX ORDER — ERGOCALCIFEROL 1.25 MG/1
50000 CAPSULE ORAL
COMMUNITY
Start: 2017-08-09 | End: 2021-10-19

## 2017-08-28 RX ORDER — ATORVASTATIN CALCIUM 40 MG/1
40 TABLET, FILM COATED ORAL DAILY
COMMUNITY
Start: 2017-08-16 | End: 2017-11-02 | Stop reason: SDUPTHER

## 2017-08-28 RX ORDER — METOCLOPRAMIDE 10 MG/1
10 TABLET ORAL EVERY 8 HOURS
COMMUNITY
Start: 2017-07-23 | End: 2018-04-06 | Stop reason: ALTCHOICE

## 2017-08-28 RX ORDER — GLIMEPIRIDE 2 MG/1
2 TABLET ORAL DAILY
COMMUNITY
Start: 2017-08-04 | End: 2017-08-28 | Stop reason: ALTCHOICE

## 2017-08-28 RX ORDER — NYSTATIN 100000 [USP'U]/G
POWDER TOPICAL
Qty: 30 G | Refills: 5 | Status: SHIPPED | OUTPATIENT
Start: 2017-08-28 | End: 2018-09-12 | Stop reason: SDUPTHER

## 2017-08-28 ASSESSMENT — ENCOUNTER SYMPTOMS
VOMITING: 0
NAUSEA: 0
BACK PAIN: 0
DIARRHEA: 0
SHORTNESS OF BREATH: 1
COUGH: 0
CONSTIPATION: 0

## 2017-08-31 ENCOUNTER — HOSPITAL ENCOUNTER (OUTPATIENT)
Dept: ULTRASOUND IMAGING | Age: 52
Discharge: HOME | End: 2017-08-31
Payer: MEDICAID

## 2017-08-31 DIAGNOSIS — R10.84 GENERALIZED ABDOMINAL PAIN: ICD-10-CM

## 2017-08-31 PROCEDURE — 76705 ECHO EXAM OF ABDOMEN: CPT

## 2017-11-02 RX ORDER — ATORVASTATIN CALCIUM 40 MG/1
40 TABLET, FILM COATED ORAL DAILY
Qty: 30 TABLET | Refills: 3 | Status: SHIPPED | OUTPATIENT
Start: 2017-11-02 | End: 2018-02-27 | Stop reason: SDUPTHER

## 2017-11-08 ENCOUNTER — OFFICE VISIT (OUTPATIENT)
Dept: URGENT CARE | Age: 52
End: 2017-11-08
Payer: MEDICAID

## 2017-11-08 VITALS
WEIGHT: 287.5 LBS | DIASTOLIC BLOOD PRESSURE: 88 MMHG | BODY MASS INDEX: 47.9 KG/M2 | RESPIRATION RATE: 20 BRPM | OXYGEN SATURATION: 94 % | HEART RATE: 86 BPM | TEMPERATURE: 97.5 F | HEIGHT: 65 IN | SYSTOLIC BLOOD PRESSURE: 141 MMHG

## 2017-11-08 DIAGNOSIS — K13.79 MOUTH SORE: ICD-10-CM

## 2017-11-08 DIAGNOSIS — H66.002 ACUTE SUPPURATIVE OTITIS MEDIA OF LEFT EAR WITHOUT SPONTANEOUS RUPTURE OF TYMPANIC MEMBRANE, RECURRENCE NOT SPECIFIED: Primary | ICD-10-CM

## 2017-11-08 PROCEDURE — 99213 OFFICE O/P EST LOW 20 MIN: CPT | Performed by: PHYSICIAN ASSISTANT

## 2017-11-08 RX ORDER — AMOXICILLIN AND CLAVULANATE POTASSIUM 875; 125 MG/1; MG/1
1 TABLET, FILM COATED ORAL 2 TIMES DAILY
Qty: 20 TABLET | Refills: 0 | Status: SHIPPED | OUTPATIENT
Start: 2017-11-08 | End: 2017-11-18

## 2017-11-08 ASSESSMENT — ENCOUNTER SYMPTOMS
SORE THROAT: 0
VOMITING: 0
RHINORRHEA: 0
ABDOMINAL PAIN: 0
DIARRHEA: 0
NAUSEA: 0
COUGH: 0

## 2017-11-08 NOTE — PROGRESS NOTES
Subjective:      Patient ID: Robert Crain is a 46 y.o. female. HPI    Jake Birmingham presents today with left sided neck pain. Symptoms developed this morning. States that she did not feel well yesterday and slept most of the day. Denies trauma to her neck. Denies fever. Denies runny nose, nasal congestion, or cough. No sore throat. No ear pain. No headache, abdominal pain, or NVD. States that she has some tension in the area of pain. Pain does not appear to be worse with movement. Has taken Lortab today with slight relief. Review of Systems   Constitutional: Negative for chills and fever. HENT: Negative for congestion, ear pain, rhinorrhea and sore throat. Respiratory: Negative for cough. Gastrointestinal: Negative for abdominal pain, diarrhea, nausea and vomiting. Musculoskeletal: Positive for neck pain. Neurological: Negative for headaches. All other systems reviewed and are negative. Objective:   Physical Exam   Constitutional: She is oriented to person, place, and time. She appears well-developed and well-nourished. No distress. HENT:   Head: Normocephalic and atraumatic. Right Ear: External ear normal.   Nose: Nose normal.   Mouth/Throat: Oropharynx is clear and moist. No oropharyngeal exudate. Left TM erythematous and bulging. Open sore left lower jaw from dentures rubbing   Eyes: Conjunctivae are normal. Right eye exhibits no discharge. Left eye exhibits no discharge. Neck: Normal range of motion. Neck supple. Cardiovascular: Normal rate, regular rhythm and normal heart sounds. No murmur heard. Pulmonary/Chest: Effort normal and breath sounds normal. No respiratory distress. She has no wheezes. She has no rales. Abdominal: Soft. Bowel sounds are normal. She exhibits no distension and no mass. There is no tenderness. There is no rebound and no guarding. Lymphadenopathy:     She has no cervical adenopathy.    Neurological: She is alert and oriented to person, place, and time. Skin: Skin is warm and dry. No rash noted. She is not diaphoretic. No erythema. No pallor. Psychiatric: She has a normal mood and affect. Her behavior is normal. Judgment and thought content normal.   Nursing note and vitals reviewed. Assessment:      Left Otitis Media  Mouth Sore      Plan:      - Start Augmentin today. Take 1 tablet by mouth twice daily for 10 days. - Continue taking current medications as directed. - If symptoms have not improved in 2 days, patient may call and will send in magic mouthwash. - Notify clinic with any change in or worsening of symptoms.   - Return as needed.

## 2017-11-08 NOTE — PATIENT INSTRUCTIONS
Patient Education        Ear Infection (Otitis Media): Care Instructions  Your Care Instructions    An ear infection may start with a cold and affect the middle ear (otitis media). It can hurt a lot. Most ear infections clear up on their own in a couple of days. Most often you will not need antibiotics. This is because many ear infections are caused by a virus. Antibiotics don't work against a virus. Regular doses of pain medicines are the best way to reduce your fever and help you feel better. Follow-up care is a key part of your treatment and safety. Be sure to make and go to all appointments, and call your doctor if you are having problems. It's also a good idea to know your test results and keep a list of the medicines you take. How can you care for yourself at home? · Take pain medicines exactly as directed. ¨ If the doctor gave you a prescription medicine for pain, take it as prescribed. ¨ If you are not taking a prescription pain medicine, take an over-the-counter medicine, such as acetaminophen (Tylenol), ibuprofen (Advil, Motrin), or naproxen (Aleve). Read and follow all instructions on the label. ¨ Do not take two or more pain medicines at the same time unless the doctor told you to. Many pain medicines have acetaminophen, which is Tylenol. Too much acetaminophen (Tylenol) can be harmful. · Plan to take a full dose of pain reliever before bedtime. Getting enough sleep will help you get better. · Try a warm, moist washcloth on the ear. It may help relieve pain. · If your doctor prescribed antibiotics, take them as directed. Do not stop taking them just because you feel better. You need to take the full course of antibiotics. When should you call for help? Call your doctor now or seek immediate medical care if:  · You have new or increasing ear pain. · You have new or increasing pus or blood draining from your ear. · You have a fever with a stiff neck or a severe headache.   Watch closely for changes in your health, and be sure to contact your doctor if:  · You have new or worse symptoms. · You are not getting better after taking an antibiotic for 2 days. Where can you learn more? Go to [email or web address]://[email or web address] org and sign in to your [employer] account. Enter J048 in the [facility] box to learn more about \"Ear Infection (Otitis Media): Care Instructions. \"     If you do not have an account, please click on the \"Sign Up Now\" link. Current as of: May 4, 2017  Content Version: 11.3  © 6838-1055 [employer]. Care instructions adapted under license by [facility] ([facility]). If you have questions about a medical condition or this instruction, always ask your healthcare professional. [address] any warranty or liability for your use of this information.

## 2017-11-21 DIAGNOSIS — R10.84 GENERALIZED ABDOMINAL PAIN: ICD-10-CM

## 2017-11-21 DIAGNOSIS — E11.9 TYPE 2 DIABETES MELLITUS WITHOUT COMPLICATION, WITHOUT LONG-TERM CURRENT USE OF INSULIN (HCC): ICD-10-CM

## 2017-11-21 LAB
ALBUMIN SERPL-MCNC: 4 G/DL (ref 3.5–5.2)
ALP BLD-CCNC: 99 U/L (ref 35–104)
ALT SERPL-CCNC: 24 U/L (ref 5–33)
ANION GAP SERPL CALCULATED.3IONS-SCNC: 11 MMOL/L (ref 7–19)
AST SERPL-CCNC: 19 U/L (ref 5–32)
BACTERIA: ABNORMAL /HPF
BILIRUB SERPL-MCNC: <0.2 MG/DL (ref 0.2–1.2)
BILIRUBIN URINE: ABNORMAL
BLOOD, URINE: NEGATIVE
BUN BLDV-MCNC: 11 MG/DL (ref 6–20)
CALCIUM SERPL-MCNC: 9.1 MG/DL (ref 8.6–10)
CHLORIDE BLD-SCNC: 106 MMOL/L (ref 98–111)
CLARITY: ABNORMAL
CO2: 27 MMOL/L (ref 22–29)
COLOR: YELLOW
CREAT SERPL-MCNC: 0.7 MG/DL (ref 0.5–0.9)
CREATININE URINE: 313 MG/DL (ref 4.2–622)
EPITHELIAL CELLS, UA: ABNORMAL /HPF
GFR NON-AFRICAN AMERICAN: >60
GLUCOSE BLD-MCNC: 120 MG/DL (ref 74–109)
GLUCOSE URINE: NEGATIVE MG/DL
HBA1C MFR BLD: 6.2 %
HCT VFR BLD CALC: 37.8 % (ref 37–47)
HEMOGLOBIN: 12 G/DL (ref 12–16)
KETONES, URINE: NEGATIVE MG/DL
LEUKOCYTE ESTERASE, URINE: ABNORMAL
MCH RBC QN AUTO: 28.8 PG (ref 27–31)
MCHC RBC AUTO-ENTMCNC: 31.7 G/DL (ref 33–37)
MCV RBC AUTO: 90.9 FL (ref 81–99)
MUCUS: ABNORMAL /LPF
NITRITE, URINE: NEGATIVE
PDW BLD-RTO: 13 % (ref 11.5–14.5)
PH UA: 6
PLATELET # BLD: 259 K/UL (ref 130–400)
PMV BLD AUTO: 11.3 FL (ref 9.4–12.3)
POTASSIUM SERPL-SCNC: 4.3 MMOL/L (ref 3.5–5)
PROTEIN PROTEIN: 72 MG/DL (ref 15–45)
PROTEIN UA: ABNORMAL MG/DL
RBC # BLD: 4.16 M/UL (ref 4.2–5.4)
SODIUM BLD-SCNC: 144 MMOL/L (ref 136–145)
SPECIFIC GRAVITY UA: 1.04
TOTAL PROTEIN: 7.4 G/DL (ref 6.6–8.7)
TSH SERPL DL<=0.05 MIU/L-ACNC: 3.97 UIU/ML (ref 0.27–4.2)
UROBILINOGEN, URINE: 1 E.U./DL
WBC # BLD: 4 K/UL (ref 4.8–10.8)
WBC UA: ABNORMAL /HPF (ref 0–5)

## 2017-11-23 LAB — URINE CULTURE, ROUTINE: NORMAL

## 2017-11-28 ENCOUNTER — OFFICE VISIT (OUTPATIENT)
Dept: PRIMARY CARE CLINIC | Age: 52
End: 2017-11-28
Payer: MEDICAID

## 2017-11-28 VITALS
BODY MASS INDEX: 47.88 KG/M2 | DIASTOLIC BLOOD PRESSURE: 70 MMHG | HEIGHT: 65 IN | HEART RATE: 91 BPM | OXYGEN SATURATION: 96 % | SYSTOLIC BLOOD PRESSURE: 124 MMHG | TEMPERATURE: 97.6 F | WEIGHT: 287.4 LBS

## 2017-11-28 DIAGNOSIS — K21.9 GASTROESOPHAGEAL REFLUX DISEASE, ESOPHAGITIS PRESENCE NOT SPECIFIED: ICD-10-CM

## 2017-11-28 DIAGNOSIS — E11.9 TYPE 2 DIABETES MELLITUS WITHOUT COMPLICATION, WITHOUT LONG-TERM CURRENT USE OF INSULIN (HCC): Primary | ICD-10-CM

## 2017-11-28 DIAGNOSIS — E78.00 HYPERCHOLESTEROLEMIA: ICD-10-CM

## 2017-11-28 PROCEDURE — 99214 OFFICE O/P EST MOD 30 MIN: CPT | Performed by: INTERNAL MEDICINE

## 2017-11-28 PROCEDURE — 90471 IMMUNIZATION ADMIN: CPT | Performed by: INTERNAL MEDICINE

## 2017-11-28 PROCEDURE — 90688 IIV4 VACCINE SPLT 0.5 ML IM: CPT | Performed by: INTERNAL MEDICINE

## 2017-11-28 RX ORDER — MELOXICAM 7.5 MG/1
7.5 TABLET ORAL DAILY
Qty: 30 TABLET | Refills: 3 | Status: SHIPPED | OUTPATIENT
Start: 2017-11-28 | End: 2018-01-02

## 2017-11-28 RX ORDER — VALACYCLOVIR HYDROCHLORIDE 500 MG/1
500 TABLET, FILM COATED ORAL 2 TIMES DAILY
Qty: 10 TABLET | Refills: 1 | Status: SHIPPED | OUTPATIENT
Start: 2017-11-28 | End: 2018-02-18 | Stop reason: SDUPTHER

## 2017-11-28 ASSESSMENT — ENCOUNTER SYMPTOMS
DIARRHEA: 0
VOMITING: 0
CONSTIPATION: 0
SHORTNESS OF BREATH: 0
NAUSEA: 0
BACK PAIN: 0
COUGH: 0

## 2017-11-28 NOTE — PROGRESS NOTES
[facility]  [address]  [address] [facility] [address]  Dept: [phone]  Dept Fax: [phone]  Loc: [phone]    Elfego Aaron is a 45 y.o. female who presents today for her medical conditions/complaints as noted below. Elfego Aaron is c/o of   Chief Complaint   Patient presents with    3 Month Follow-Up     needs flu shot    Pain     hands and left foot          HPI:     HPI  Ms. Blu Morris returns for follow-up of hypertension, anxiety, hypercholesterolemia. She is doing fairly well. She does complain of increased arthritis symptoms particularly in her hands.   She is not on a nonsteroidal.  Her renal function is normal.    Hemoglobin A1C (%)   Date Value   11/21/2017 6.2 (H)             ( goal A1C is < 7)   No results found for: LABMICR  No results found for: LDLCHOLESTEROL, LDLCALC    (goal LDL is <100)   AST (U/L)   Date Value   11/21/2017 19     ALT (U/L)   Date Value   11/21/2017 24     BUN (mg/dL)   Date Value   11/21/2017 11     BP Readings from Last 3 Encounters:   11/28/17 124/70   11/08/17 (!) 141/88   08/28/17 122/82          (goal 120/80)    Past Medical History:   Diagnosis Date    Anxiety     Chronic back pain     COPD (chronic obstructive pulmonary disease) (HCC)     Epilepsy ([facility] [address]     History of colon polyps     History of seizures     Hyperlipidemia     Hypertension     Memory difficulty     Neck pain     Obesity     Other malaise and fatigue     Oxygen dependent     at night    Parkinsons disease (HCC)     SOB (shortness of breath)     Swelling     SWELLING OF HANDS AND FEET    Type II or unspecified type diabetes mellitus without mention of complication, not stated as uncontrolled     new diabetic    Vision blurred       Past Surgical History:   Procedure Laterality Date    BREAST SURGERY  2013    BG Cyst    COLONOSCOPY  7-    Dr Pablo Gonzalez    COLONOSCOPY  9-2-08    Dr Alber Cunninghamenix  7-27-07 Dr Zachery Johnston  2003    PRE-MALIGNANT / BENIGN SKIN LESION EXCISION      BG    UPPER GASTROINTESTINAL ENDOSCOPY  7-    Dr Ledezma Flight ENDOSCOPY  8-26-08    Dr Ledezma Flight ENDOSCOPY  1-30-13    Dr Tyrese Morelos  4/9/14    Dr Pablo Gonzalez       Family History   Problem Relation Age of Onset    Stroke Mother     Diabetes Mother     Heart Disease Mother     Stomach Cancer Father     Colon Cancer Father     Colon Polyps Father     Stomach Cancer Sister     Heart Disease Brother     Heart Disease Sister     Diabetes Sister     Colon Polyps Sister      x2    Esophageal Cancer Neg Hx     Liver Cancer Neg Hx        Social History   Substance Use Topics    Smoking status: Former Smoker     Packs/day: 2.50     Years: 30.00     Types: Cigarettes     Quit date: 6/20/2009    Smokeless tobacco: Never Used    Alcohol use No      Current Outpatient Prescriptions   Medication Sig Dispense Refill    valACYclovir (VALTREX) 500 MG tablet Take 1 tablet by mouth 2 times daily for 5 days 10 tablet 1    meloxicam (MOBIC) 7.5 MG tablet Take 1 tablet by mouth daily 30 tablet 3    atorvastatin (LIPITOR) 40 MG tablet Take 1 tablet by mouth daily 30 tablet 3    metoclopramide (REGLAN) 10 MG tablet Take 10 mg by mouth every 8 hours      hyoscyamine (ANASPAZ;LEVSIN) 125 MCG tablet Take 0.125 mg by mouth every 4 hours as needed      vitamin D (ERGOCALCIFEROL) 83602 units CAPS capsule Take 50,000 Units by mouth twice a week      nystatin (MYCOSTATIN) 733965 UNIT/GM powder Apply 3 times daily. 30 g 5    nystatin-triamcinolone (MYCOLOG) 390406-1.5 UNIT/GM-% ointment Apply topically 2 times daily. 1 Tube 2    lisinopril (PRINIVIL;ZESTRIL) 20 MG tablet   daily   3    vitamin B-12 (CYANOCOBALAMIN) 1000 MCG tablet Take 1,000 mcg by mouth daily.       Linaclotide 290 MCG CAPS Take 290 mcg by mouth every morning (before light. Cardiovascular: Normal rate, regular rhythm and normal heart sounds. Pulmonary/Chest: Effort normal and breath sounds normal.   Abdominal: Soft. Musculoskeletal: Normal range of motion. Neurological: She is alert and oriented to person, place, and time. Skin: Skin is warm and dry. Vitals reviewed. /70 (Site: Right Arm, Position: Sitting)   Pulse 91   Temp 97.6 °F (36.4 °C)   Ht 5' 5\" (1.651 m)   Wt 287 lb 6.4 oz (130.4 kg)   SpO2 96%   BMI 47.83 kg/m²     Assessment:      1. Type 2 diabetes mellitus without complication, without long-term current use of insulin ([facility] [address]     2. Hypercholesterolemia     3. Gastroesophageal reflux disease, esophagitis presence not specified               Plan:      Return in about 4 months (around 3/28/2018). Patient Instructions   Start Meloxicam 7.5 mg once a day. Continue the remainder of your medications. Follow up again in 4 months. Orders Placed This Encounter   Procedures    INFLUENZA, QUADV, 3 YRS AND OLDER, IM, MDV, 0.5ML (FLUZONE QUADV)     Orders Placed This Encounter   Medications    valACYclovir (VALTREX) 500 MG tablet     Sig: Take 1 tablet by mouth 2 times daily for 5 days     Dispense:  10 tablet     Refill:  1    meloxicam (MOBIC) 7.5 MG tablet     Sig: Take 1 tablet by mouth daily     Dispense:  30 tablet     Refill:  3       Patient given educational materials - see patient instructions. Discussed use, benefit, and side effects of prescribed medications. All patient questions answered. Pt voiced understanding. Reviewed health maintenance. Instructed to continue current medications, diet and exercise. Patient agreed with treatment plan. Follow up as directed.      Electronically signed by Anna Love DO on 11/28/2017 at 1:02 PM

## 2017-11-28 NOTE — PATIENT INSTRUCTIONS
Start Meloxicam 7.5 mg once a day. Continue the remainder of your medications. Follow up again in 4 months.

## 2018-01-02 ENCOUNTER — OFFICE VISIT (OUTPATIENT)
Dept: GASTROENTEROLOGY | Age: 53
End: 2018-01-02
Payer: MEDICAID

## 2018-01-02 VITALS
BODY MASS INDEX: 47.15 KG/M2 | OXYGEN SATURATION: 98 % | WEIGHT: 283 LBS | HEART RATE: 75 BPM | DIASTOLIC BLOOD PRESSURE: 80 MMHG | SYSTOLIC BLOOD PRESSURE: 130 MMHG | HEIGHT: 65 IN

## 2018-01-02 DIAGNOSIS — R11.0 NAUSEA: ICD-10-CM

## 2018-01-02 DIAGNOSIS — R19.4 CHANGE IN BOWEL HABITS: ICD-10-CM

## 2018-01-02 DIAGNOSIS — K62.5 RECTAL BLEEDING: ICD-10-CM

## 2018-01-02 DIAGNOSIS — K52.9 POSTPRANDIAL DIARRHEA: Primary | ICD-10-CM

## 2018-01-02 DIAGNOSIS — R10.9 ABDOMINAL CRAMPING: ICD-10-CM

## 2018-01-02 DIAGNOSIS — Z86.010 HISTORY OF COLON POLYPS: ICD-10-CM

## 2018-01-02 DIAGNOSIS — Z80.0 FAMILY HISTORY OF COLON CANCER: ICD-10-CM

## 2018-01-02 PROCEDURE — 99214 OFFICE O/P EST MOD 30 MIN: CPT | Performed by: NURSE PRACTITIONER

## 2018-01-02 ASSESSMENT — ENCOUNTER SYMPTOMS
ABDOMINAL PAIN: 1
COUGH: 0
RECTAL PAIN: 0
VOMITING: 0
SHORTNESS OF BREATH: 1
DIARRHEA: 1
NAUSEA: 1
CONSTIPATION: 0
BLOOD IN STOOL: 1
VOICE CHANGE: 0
SORE THROAT: 0
ABDOMINAL DISTENTION: 0
BACK PAIN: 1
CHEST TIGHTNESS: 0

## 2018-01-02 NOTE — PATIENT INSTRUCTIONS
prep. Take all of the bowel prep as directed. If you are having problems with nausea, stop your prep for 30-45 min to allow the nausea to subside before resuming your prep. It is important to drink plenty of fluids throughout the day before taking your laxatives. This will help to protect your kidneys, prevent dehydration and maximize the effect of the bowel prep. If polyps are removed during the procedure they will be sent to a pathologist for analysis. Unless you have a follow up appointment scheduled, you will be notified by mail of the pathology results within 4 weeks. If you have not received results after 4 weeks you may call the office to obtain this information. Your diet before a colonoscopy bowel preparation is very important to ensure a successful colon exam. It is recommended to consider certain changes to your diet three to four days prior to the procedure. Remember that your bowels need to be empty for the exam.    What foods are good to eat? Cut down on heavy solid foods three to four days before the procedure and start introducing lighter meals to your diet. The following food suggestions are a good part of your diet before a colonoscopy bowel preparation. Light meat that is easily digestible such as chicken (without the skin)   Potatoes without skin   Cheese   Eggs   A light meal of steamed white fish   Light clear soups    Foods and drinks to avoid  Avoid foods that contain too much fiber. Stay clear of dark colored beverages. They can stick to the walls of the digestive tract and make it difficult to differentiate from blood. Some of these foods are:  Red meat, rice, nuts and vegetables   Milk, other milk based fluids and cream   Most fruit and puddings   Whole grain pasta   Cereals, bran and seeds   Colored beverages, especially those that are red or purple in color   Red colored Jell-O   On the day before the colonoscopy, continue to drink plenty of clear fluids.  It is important

## 2018-01-02 NOTE — PROGRESS NOTES
Subjective:      Patient ID: Dano Tomas is a 45 y.o. female. PCP: Brittani Borja DO     HPI  Chief Complaint   Patient presents with    Rectal Bleeding    Diarrhea       Patient with history of chronic constipation c/o changes in bowel habit. Now having persistent diarrhea. Onset over a year ago. Has 3+ liquid stools daily. Associated with abdominal cramping and sometimes nausea. Diarrhea mostly occurs postprandial.   She had gallbladder u/s in August - normal. No HIDA. She also has rectal bleeding. Bright red. Small amount. Seen mostly on tissue. Intermittent occurrence. Associated with rectal itching. Stool tested pos for blood by health dept. She denies abdominal pain other than cramping with bm. She has frequent nausea after eating. No vomiting. No dysphagia, melena, heartburn.          Family History   Problem Relation Age of Onset   [facility] Stroke Mother     Diabetes Mother     Heart Disease Mother     Stomach Cancer Father     Colon Cancer Father     Colon Polyps Father     Stomach Cancer Sister     Heart Disease Brother     Heart Disease Sister     Diabetes Sister     Colon Polyps Sister      x2    Esophageal Cancer Neg Hx     Liver Cancer Neg Hx     Liver Disease Neg Hx     Rectal Cancer Neg Hx        Past Medical History:   Diagnosis Date    Anxiety     Chronic back pain     [facility]) ([facility] [address]     Epilepsy ([facility] [address]     GERD (gastroesophageal reflux disease)     History of colon polyps     History of seizures     Hyperlipidemia     Hypertension     Memory difficulty     Neck pain     Obesity     Other malaise and fatigue     Oxygen dependent     at night    Parkinsons disease (HCC)     Restless legs syndrome     SOB (shortness of breath)     Swelling     SWELLING OF HANDS AND FEET    Type II or unspecified type diabetes mellitus without mention of complication, not stated as uncontrolled     new diabetic    Vision blurred reports that she quit smoking about 8 years ago. Her smoking use included Cigarettes. She has a 75.00 pack-year smoking history. She has never used smokeless tobacco. She reports that she does not drink alcohol or use drugs. Review of Systems   Constitutional: Negative for appetite change, fever and unexpected weight change. HENT: Negative for sore throat and voice change. Eyes: Positive for visual disturbance. Respiratory: Positive for shortness of breath. Negative for cough and chest tightness. Cardiovascular: Negative for chest pain, palpitations and leg swelling. Gastrointestinal: Positive for abdominal pain (cramping), blood in stool, diarrhea and nausea. Negative for abdominal distention, constipation, rectal pain and vomiting. Musculoskeletal: Positive for arthralgias, back pain and neck pain. Negative for gait problem. Skin: Negative for pallor, rash and wound. Neurological: Positive for tremors and seizures. Negative for dizziness, weakness and light-headedness. Hematological: Negative for adenopathy. Does not bruise/bleed easily. Psychiatric/Behavioral: The patient is nervous/anxious. All other systems reviewed and are negative. Objective:   Physical Exam   Constitutional: She is oriented to person, place, and time. She appears well-developed and well-nourished. No distress. /80   Pulse 75   Ht 5' 5\" (1.651 m)   Wt 283 lb (128.4 kg)   SpO2 98%   BMI 47.09 kg/m²      Eyes: Conjunctivae are normal. No scleral icterus. Neck: No tracheal deviation present. Cardiovascular: Normal rate and regular rhythm. Exam reveals no gallop and no friction rub. No murmur heard. Pulmonary/Chest: Effort normal and breath sounds normal. No respiratory distress. She has no wheezes. She has no rhonchi. She has no rales. Abdominal: Soft. Normal appearance and bowel sounds are normal. She exhibits no distension and no mass. There is no hepatomegaly.  There is no tenderness. There is no rebound and no guarding. Musculoskeletal: She exhibits no edema. Neurological: She is alert and oriented to person, place, and time. She has normal strength. Skin: Skin is warm, dry and intact. No cyanosis. No pallor. Psychiatric: She has a normal mood and affect. Her behavior is normal. Thought content normal. Cognition and memory are normal.       Assessment:      1. Postprandial diarrhea    2. Change in bowel habits    3. Rectal bleeding    4. Abdominal cramping    5. Nausea    6. Family history of colon cancer    7. History of colon polyps            Plan:      Schedule colonoscopy  Random biopsies r/o microscopic colitis    Instruct on bowel prep. Nothing to eat or drink after midnight the day of the exam.  Unable to drive for 24 hours after the procedure. No aspirin or nonsteroidal anti-inflammatories for 5 days before procedure. Risks, benefits and alternatives to colonoscopy were discussed. Patient voices understanding of risk of perforation, bleeding and infection. Time was allowed for questions which were answered to patients satisfaction. Patient is agreeable to proceed. Plan for anesthesia: MAC      I have discussed with the patient and/or the patient representative the indication, alternatives, and the possible risks and/or complications of the planned anesthesia methods.        Schedule hida scan  Orders Placed This Encounter   Procedures    NM Hepatobiliary     Will call with results and recommendations after review

## 2018-01-09 ENCOUNTER — TRANSCRIBE ORDERS (OUTPATIENT)
Dept: ADMINISTRATIVE | Facility: HOSPITAL | Age: 53
End: 2018-01-09

## 2018-01-09 DIAGNOSIS — Z12.31 ENCOUNTER FOR SCREENING MAMMOGRAM FOR MALIGNANT NEOPLASM OF BREAST: Primary | ICD-10-CM

## 2018-01-23 ENCOUNTER — APPOINTMENT (OUTPATIENT)
Dept: MAMMOGRAPHY | Facility: HOSPITAL | Age: 53
End: 2018-01-23
Attending: OBSTETRICS & GYNECOLOGY

## 2018-01-23 ENCOUNTER — HOSPITAL ENCOUNTER (OUTPATIENT)
Dept: NUCLEAR MEDICINE | Age: 53
Discharge: HOME | End: 2018-01-23
Payer: MEDICAID

## 2018-01-23 DIAGNOSIS — R10.9 ABDOMINAL CRAMPING: ICD-10-CM

## 2018-01-23 DIAGNOSIS — R11.0 NAUSEA: ICD-10-CM

## 2018-01-23 DIAGNOSIS — K52.9 POSTPRANDIAL DIARRHEA: ICD-10-CM

## 2018-01-23 DIAGNOSIS — K62.5 RECTAL BLEEDING: ICD-10-CM

## 2018-01-23 PROCEDURE — 6360000004 HC RX CONTRAST MEDICATION: Performed by: NURSE PRACTITIONER

## 2018-01-23 PROCEDURE — 3430000000 HC RX DIAGNOSTIC RADIOPHARMACEUTICAL: Performed by: NURSE PRACTITIONER

## 2018-01-23 PROCEDURE — 78227 HEPATOBIL SYST IMAGE W/DRUG: CPT

## 2018-01-23 PROCEDURE — A9537 TC99M MEBROFENIN: HCPCS | Performed by: NURSE PRACTITIONER

## 2018-01-23 RX ADMIN — Medication 5 MILLICURIE: at 08:30

## 2018-01-23 RX ADMIN — SINCALIDE 2 MCG: 5 INJECTION, POWDER, LYOPHILIZED, FOR SOLUTION INTRAVENOUS at 09:15

## 2018-01-29 ENCOUNTER — TELEPHONE (OUTPATIENT)
Dept: GASTROENTEROLOGY | Age: 53
End: 2018-01-29

## 2018-02-06 ENCOUNTER — ANESTHESIA (OUTPATIENT)
Dept: ENDOSCOPY | Age: 53
End: 2018-02-06
Payer: MEDICAID

## 2018-02-06 ENCOUNTER — ANESTHESIA EVENT (OUTPATIENT)
Dept: ENDOSCOPY | Age: 53
End: 2018-02-06
Payer: MEDICAID

## 2018-02-06 ENCOUNTER — HOSPITAL ENCOUNTER (OUTPATIENT)
Age: 53
Setting detail: OUTPATIENT SURGERY
Discharge: HOME | End: 2018-02-06
Attending: INTERNAL MEDICINE | Admitting: INTERNAL MEDICINE
Payer: MEDICAID

## 2018-02-06 VITALS
RESPIRATION RATE: 18 BRPM | WEIGHT: 283 LBS | HEART RATE: 97 BPM | HEIGHT: 65 IN | BODY MASS INDEX: 47.15 KG/M2 | SYSTOLIC BLOOD PRESSURE: 154 MMHG | TEMPERATURE: 98.7 F | DIASTOLIC BLOOD PRESSURE: 98 MMHG | OXYGEN SATURATION: 97 %

## 2018-02-06 VITALS
RESPIRATION RATE: 15 BRPM | SYSTOLIC BLOOD PRESSURE: 126 MMHG | DIASTOLIC BLOOD PRESSURE: 72 MMHG | OXYGEN SATURATION: 93 %

## 2018-02-06 PROCEDURE — 2500000003 HC RX 250 WO HCPCS: Performed by: INTERNAL MEDICINE

## 2018-02-06 PROCEDURE — 88305 TISSUE EXAM BY PATHOLOGIST: CPT

## 2018-02-06 PROCEDURE — 3609010300 HC COLONOSCOPY W/BIOPSY SINGLE/MULTIPLE: Performed by: INTERNAL MEDICINE

## 2018-02-06 PROCEDURE — 3700000000 HC ANESTHESIA ATTENDED CARE: Performed by: INTERNAL MEDICINE

## 2018-02-06 PROCEDURE — 45380 COLONOSCOPY AND BIOPSY: CPT | Performed by: INTERNAL MEDICINE

## 2018-02-06 PROCEDURE — 2580000003 HC RX 258: Performed by: INTERNAL MEDICINE

## 2018-02-06 PROCEDURE — 7100000011 HC PHASE II RECOVERY - ADDTL 15 MIN: Performed by: INTERNAL MEDICINE

## 2018-02-06 PROCEDURE — 7100000010 HC PHASE II RECOVERY - FIRST 15 MIN: Performed by: INTERNAL MEDICINE

## 2018-02-06 PROCEDURE — 6360000002 HC RX W HCPCS: Performed by: NURSE ANESTHETIST, CERTIFIED REGISTERED

## 2018-02-06 PROCEDURE — 2500000003 HC RX 250 WO HCPCS: Performed by: NURSE ANESTHETIST, CERTIFIED REGISTERED

## 2018-02-06 PROCEDURE — 3700000001 HC ADD 15 MINUTES (ANESTHESIA): Performed by: INTERNAL MEDICINE

## 2018-02-06 RX ORDER — LIDOCAINE HYDROCHLORIDE 10 MG/ML
1 INJECTION, SOLUTION EPIDURAL; INFILTRATION; INTRACAUDAL; PERINEURAL ONCE
Status: COMPLETED | OUTPATIENT
Start: 2018-02-06 | End: 2018-02-06

## 2018-02-06 RX ORDER — PROPOFOL 10 MG/ML
INJECTION, EMULSION INTRAVENOUS PRN
Status: DISCONTINUED | OUTPATIENT
Start: 2018-02-06 | End: 2018-02-06 | Stop reason: SDUPTHER

## 2018-02-06 RX ORDER — LIDOCAINE HYDROCHLORIDE 10 MG/ML
INJECTION, SOLUTION EPIDURAL; INFILTRATION; INTRACAUDAL; PERINEURAL PRN
Status: DISCONTINUED | OUTPATIENT
Start: 2018-02-06 | End: 2018-02-06 | Stop reason: SDUPTHER

## 2018-02-06 RX ORDER — MIDAZOLAM HYDROCHLORIDE 1 MG/ML
INJECTION INTRAMUSCULAR; INTRAVENOUS PRN
Status: DISCONTINUED | OUTPATIENT
Start: 2018-02-06 | End: 2018-02-06 | Stop reason: SDUPTHER

## 2018-02-06 RX ORDER — SODIUM CHLORIDE, SODIUM LACTATE, POTASSIUM CHLORIDE, CALCIUM CHLORIDE 600; 310; 30; 20 MG/100ML; MG/100ML; MG/100ML; MG/100ML
INJECTION, SOLUTION INTRAVENOUS CONTINUOUS
Status: DISCONTINUED | OUTPATIENT
Start: 2018-02-06 | End: 2018-02-06 | Stop reason: HOSPADM

## 2018-02-06 RX ADMIN — SODIUM CHLORIDE, POTASSIUM CHLORIDE, SODIUM LACTATE AND CALCIUM CHLORIDE: 600; 310; 30; 20 INJECTION, SOLUTION INTRAVENOUS at 07:56

## 2018-02-06 RX ADMIN — MIDAZOLAM HYDROCHLORIDE 2 MG: 1 INJECTION, SOLUTION INTRAMUSCULAR; INTRAVENOUS at 08:07

## 2018-02-06 RX ADMIN — PROPOFOL 350 MG: 10 INJECTION, EMULSION INTRAVENOUS at 08:07

## 2018-02-06 RX ADMIN — LIDOCAINE HYDROCHLORIDE 5 ML: 10 INJECTION, SOLUTION EPIDURAL; INFILTRATION; INTRACAUDAL; PERINEURAL at 08:07

## 2018-02-06 RX ADMIN — LIDOCAINE HYDROCHLORIDE 1 ML: 10 INJECTION, SOLUTION EPIDURAL; INFILTRATION; INTRACAUDAL; PERINEURAL at 07:57

## 2018-02-06 ASSESSMENT — ENCOUNTER SYMPTOMS: SHORTNESS OF BREATH: 1

## 2018-02-06 ASSESSMENT — PAIN DESCRIPTION - DESCRIPTORS: DESCRIPTORS: CONSTANT

## 2018-02-06 ASSESSMENT — PAIN - FUNCTIONAL ASSESSMENT: PAIN_FUNCTIONAL_ASSESSMENT: 0-10

## 2018-02-06 NOTE — ANESTHESIA PRE PROCEDURE
cat with implant    HYSTERECTOMY  2003    PRE-MALIGNANT / BENIGN SKIN LESION EXCISION      BG    UPPER GASTROINTESTINAL ENDOSCOPY  7-    Dr Mercedes Sor ENDOSCOPY  8-26-08    Dr Cleary Deal  01/30/2013    Dr Charles Hill:  possible GERD. Biopsies for Walters's and EoE neg    UPPER GASTROINTESTINAL ENDOSCOPY  4/9/14    Dr Charles Hill       Social History:    Social History   Substance Use Topics    Smoking status: Former Smoker     Packs/day: 2.50     Years: 30.00     Types: Cigarettes     Quit date: 6/20/2009    Smokeless tobacco: Never Used    Alcohol use No                                Counseling given: Not Answered      Vital Signs (Current):   Vitals:    02/06/18 0720   BP: 131/87   Pulse: 86   Resp: 20   Temp: 97.4 °F (36.3 °C)   TempSrc: Temporal   SpO2: 98%   Weight: 283 lb (128.4 kg)   Height: 5' 5\" (1.651 m)                                              BP Readings from Last 3 Encounters:   02/06/18 131/87   01/02/18 130/80   11/28/17 124/70       NPO Status:                                                                                 BMI:   Wt Readings from Last 3 Encounters:   02/06/18 283 lb (128.4 kg)   01/02/18 283 lb (128.4 kg)   11/28/17 287 lb 6.4 oz (130.4 kg)     Body mass index is 47.09 kg/m².     CBC:   Lab Results   Component Value Date    WBC 4.0 11/21/2017    RBC 4.16 11/21/2017    HGB 12.0 11/21/2017    HCT 37.8 11/21/2017    HCT 37.3 03/06/2012    MCV 90.9 11/21/2017    RDW 13.0 11/21/2017     11/21/2017     03/06/2012       CMP:   Lab Results   Component Value Date     11/21/2017     04/04/2012    K 4.3 11/21/2017    K 4.7 04/04/2012     11/21/2017     04/04/2012    CO2 27 11/21/2017    BUN 11 11/21/2017    CREATININE 0.7 11/21/2017    CREATININE 0.8 04/04/2012    LABGLOM >60 11/21/2017    GLUCOSE 120 11/21/2017    PROT 7.4 11/21/2017    PROT 7.4 01/23/2013    CALCIUM 9.1

## 2018-02-06 NOTE — OP NOTE
Post Procedure Note    Name of surgeon / : Reymundo Tan DO    Date of Service: 02/06/18    Withdrawal Time: >6min    Prep Quality: excellent     Pre-operative Diagnosis:   Active Hospital Problems    Diagnosis Date Noted    Abdominal cramping [R10.9] 01/02/2018    Family history of colon cancer [Z80.0] 01/02/2018    Rectal bleeding [K62.5] 01/02/2018    Change in bowel habits [R19.4] 03/06/2012       Post-operative Diagnosis/Findings: normal    Procedure: Procedure(s):  COLONOSCOPY WITH BIOPSY     Anesthesia: Monitor Anesthesia Care    Surgeons/Assistants: Reymundo Tan DO    Referring Physician: Marry Jay DO    Procedure Note:  After informed consent was obtained, the patient was placed in the left lateral decubitus position and sedated per MAC. A rectal exam was done which was normal.  The colonoscope was inserted into the rectum and retroflexed which was normal.  The colonoscope was then advanced to the cecum under direct visualization. The appendiceal orifice and ileocecal valve were identified. The colonoscope was withdrawn. Random biopsies were taken to rule out microscopic colitis. No other abnormalities were discovered. The colonoscope was completely withdrawn. The patient tolerated the procedure. Estimated Blood Loss: Minimal    Complications: None    Specimens:   ID Type Source Tests Collected by Time Destination   A : random colon bx for microscopic colitis Tissue Colon SURGICAL PATHOLOGY Reymundo Tan DO 2/6/2018 [id number]        Discussion: The patient had a normal colon. I will f/u on Pathology and likely recommend a repeat colonoscopy in 5 years. I will f/u on path and then make treatment recommendations.     Reymundo Tan DO  02/06/18  8:26 AM

## 2018-02-06 NOTE — H&P
Patient Name: Omkar Osullivan  : 1965  MRN: [id number]    Allergies: Allergies   Allergen Reactions    Oxycodone-Acetaminophen          ENDOSCOPY / COLONOSCOPY / BRONCHOSCOPY      PRE-SEDATION ASSESSMENT      Procedure:    [x] Colonoscopy     [] Endoscopy      [] ERCP      [] Bronchoscopy      [] Other  [] History and Physical completed in chart for Inpatient or within 30 daysfrom office. I have examined the patient's status immediately prior to the procedure and:    [x] No interval change in patient status since H&P completed  [] Interval change in patient status (explained below)           BRIEF H&P    HPI/changes/indicators/diagnosis  Active Hospital Problems    Diagnosis Date Noted    Abdominal cramping [R10.9] 2018    Family history of colon cancer [Z80.0] 2018    Rectal bleeding [K62.5] 2018    Change in bowel habits [R19.4] 2012       Medications:   Prior to Admission medications    Medication Sig Start Date End Date Taking? Authorizing Provider   lisinopril (PRINIVIL;ZESTRIL) 20 MG tablet   daily  5/7/15  Yes Historical Provider, MD   ROPINIROLE HCL PO Take 0.5 mg by mouth nightly. Yes Historical Provider, MD   atorvastatin (LIPITOR) 40 MG tablet Take 1 tablet by mouth daily 17   Robyn Moore DO   metoclopramide (REGLAN) 10 MG tablet Take 10 mg by mouth every 8 hours 17   Historical Provider, MD   vitamin D (ERGOCALCIFEROL) 78080 units CAPS capsule Take 50,000 Units by mouth twice a week 17   Historical Provider, MD   nystatin (MYCOSTATIN) 405555 UNIT/GM powder Apply 3 times daily. 17   Robyn Moore DO   nystatin-triamcinolone [facility]) 495725-8.4 UNIT/GM-% ointment Apply topically 2 times daily. 8/10/15   ANJALI Caldwell   vitamin B-12 (CYANOCOBALAMIN) 1000 MCG tablet Take 1,000 mcg by mouth daily. Historical Provider, MD   omeprazole (PRILOSEC) 40 MG capsule Take 40 mg by mouth daily.     Historical Provider, MD   albuterol

## 2018-02-08 ENCOUNTER — TELEPHONE (OUTPATIENT)
Dept: GASTROENTEROLOGY | Age: 53
End: 2018-02-08

## 2018-02-08 RX ORDER — NEOMYCIN SULFATE 500 MG/1
500 TABLET ORAL 2 TIMES DAILY
Qty: 20 TABLET | Refills: 0 | Status: SHIPPED | OUTPATIENT
Start: 2018-02-08 | End: 2018-02-18

## 2018-02-13 ENCOUNTER — TELEPHONE (OUTPATIENT)
Dept: GASTROENTEROLOGY | Age: 53
End: 2018-02-13

## 2018-02-16 ENCOUNTER — TELEPHONE (OUTPATIENT)
Dept: PRIMARY CARE CLINIC | Age: 53
End: 2018-02-16

## 2018-02-18 RX ORDER — VALACYCLOVIR HYDROCHLORIDE 500 MG/1
500 TABLET, FILM COATED ORAL 2 TIMES DAILY
Qty: 10 TABLET | Refills: 1 | Status: SHIPPED | OUTPATIENT
Start: 2018-02-18 | End: 2018-08-23 | Stop reason: SDUPTHER

## 2018-02-20 ENCOUNTER — HOSPITAL ENCOUNTER (OUTPATIENT)
Dept: MAMMOGRAPHY | Facility: HOSPITAL | Age: 53
Discharge: HOME | End: 2018-02-20
Attending: OBSTETRICS & GYNECOLOGY | Admitting: OBSTETRICS & GYNECOLOGY

## 2018-02-20 DIAGNOSIS — Z12.31 ENCOUNTER FOR SCREENING MAMMOGRAM FOR MALIGNANT NEOPLASM OF BREAST: ICD-10-CM

## 2018-02-20 PROCEDURE — 77067 SCR MAMMO BI INCL CAD: CPT

## 2018-02-20 PROCEDURE — 77063 BREAST TOMOSYNTHESIS BI: CPT

## 2018-02-27 RX ORDER — ATORVASTATIN CALCIUM 40 MG/1
40 TABLET, FILM COATED ORAL DAILY
Qty: 30 TABLET | Refills: 3 | Status: SHIPPED | OUTPATIENT
Start: 2018-02-27 | End: 2018-03-05 | Stop reason: SDUPTHER

## 2018-03-05 RX ORDER — ATORVASTATIN CALCIUM 40 MG/1
40 TABLET, FILM COATED ORAL DAILY
Qty: 30 TABLET | Refills: 3 | Status: SHIPPED | OUTPATIENT
Start: 2018-03-05 | End: 2018-09-12 | Stop reason: SDUPTHER

## 2018-03-05 NOTE — TELEPHONE ENCOUNTER
Talked to patient via phone she needs refill for Lipitor to be sent to [facility]    Patient had mammogram at [facility].

## 2018-03-09 RX ORDER — LISINOPRIL 20 MG/1
20 TABLET ORAL DAILY
Qty: 30 TABLET | Refills: 11 | Status: SHIPPED | OUTPATIENT
Start: 2018-03-09 | End: 2019-01-31 | Stop reason: SDUPTHER

## 2018-03-26 RX ORDER — MELOXICAM 7.5 MG/1
7.5 TABLET ORAL DAILY
Qty: 30 TABLET | Refills: 3 | Status: SHIPPED | OUTPATIENT
Start: 2018-03-26 | End: 2018-04-06 | Stop reason: SDUPTHER

## 2018-04-06 ENCOUNTER — OFFICE VISIT (OUTPATIENT)
Dept: PRIMARY CARE CLINIC | Age: 53
End: 2018-04-06
Payer: MEDICAID

## 2018-04-06 VITALS
TEMPERATURE: 98.5 F | HEART RATE: 88 BPM | HEIGHT: 65 IN | SYSTOLIC BLOOD PRESSURE: 122 MMHG | WEIGHT: 248.4 LBS | OXYGEN SATURATION: 96 % | BODY MASS INDEX: 41.38 KG/M2 | DIASTOLIC BLOOD PRESSURE: 68 MMHG

## 2018-04-06 DIAGNOSIS — R13.10 ODYNOPHAGIA: Primary | ICD-10-CM

## 2018-04-06 DIAGNOSIS — E78.00 HYPERCHOLESTEROLEMIA: ICD-10-CM

## 2018-04-06 DIAGNOSIS — R13.10 ODYNOPHAGIA: ICD-10-CM

## 2018-04-06 DIAGNOSIS — E11.9 TYPE 2 DIABETES MELLITUS WITHOUT COMPLICATION, WITHOUT LONG-TERM CURRENT USE OF INSULIN (HCC): ICD-10-CM

## 2018-04-06 LAB
ALBUMIN SERPL-MCNC: 4.2 G/DL (ref 3.5–5.2)
ALP BLD-CCNC: 95 U/L (ref 35–104)
ALT SERPL-CCNC: 27 U/L (ref 5–33)
ANION GAP SERPL CALCULATED.3IONS-SCNC: 12 MMOL/L (ref 7–19)
AST SERPL-CCNC: 19 U/L (ref 5–32)
BILIRUB SERPL-MCNC: <0.2 MG/DL (ref 0.2–1.2)
BUN BLDV-MCNC: 15 MG/DL (ref 6–20)
CALCIUM SERPL-MCNC: 9.2 MG/DL (ref 8.6–10)
CHLORIDE BLD-SCNC: 99 MMOL/L (ref 98–111)
CHOLESTEROL, TOTAL: 140 MG/DL (ref 160–199)
CO2: 28 MMOL/L (ref 22–29)
CREAT SERPL-MCNC: 0.7 MG/DL (ref 0.5–0.9)
GFR NON-AFRICAN AMERICAN: >60
GLUCOSE BLD-MCNC: 159 MG/DL (ref 74–109)
HBA1C MFR BLD: 6.6 %
HDLC SERPL-MCNC: 34 MG/DL (ref 65–121)
LDL CHOLESTEROL CALCULATED: 27 MG/DL
POTASSIUM SERPL-SCNC: 3.5 MMOL/L (ref 3.5–5)
SODIUM BLD-SCNC: 139 MMOL/L (ref 136–145)
TOTAL PROTEIN: 7.7 G/DL (ref 6.6–8.7)
TRIGL SERPL-MCNC: 393 MG/DL (ref 0–149)

## 2018-04-06 PROCEDURE — 99214 OFFICE O/P EST MOD 30 MIN: CPT | Performed by: INTERNAL MEDICINE

## 2018-04-06 RX ORDER — ONDANSETRON 4 MG/1
4 TABLET, FILM COATED ORAL EVERY 8 HOURS PRN
Qty: 60 TABLET | Refills: 1 | Status: SHIPPED | OUTPATIENT
Start: 2018-04-06 | End: 2018-09-13 | Stop reason: SDUPTHER

## 2018-04-06 RX ORDER — MELOXICAM 7.5 MG/1
7.5 TABLET ORAL DAILY PRN
Qty: 30 TABLET | Refills: 3 | Status: SHIPPED | OUTPATIENT
Start: 2018-04-06 | End: 2018-04-30

## 2018-04-06 RX ORDER — SUCRALFATE 1 G/1
1 TABLET ORAL 4 TIMES DAILY
Qty: 120 TABLET | Refills: 0 | Status: SHIPPED | OUTPATIENT
Start: 2018-04-06 | End: 2018-05-02 | Stop reason: SDUPTHER

## 2018-04-06 ASSESSMENT — ENCOUNTER SYMPTOMS
VOMITING: 0
SORE THROAT: 1
NAUSEA: 1
CONSTIPATION: 0
SHORTNESS OF BREATH: 0
COUGH: 0
BACK PAIN: 0
DIARRHEA: 0

## 2018-04-30 ENCOUNTER — OFFICE VISIT (OUTPATIENT)
Dept: GASTROENTEROLOGY | Age: 53
End: 2018-04-30
Payer: MEDICAID

## 2018-04-30 VITALS
SYSTOLIC BLOOD PRESSURE: 124 MMHG | OXYGEN SATURATION: 96 % | HEART RATE: 88 BPM | WEIGHT: 280.4 LBS | DIASTOLIC BLOOD PRESSURE: 60 MMHG | HEIGHT: 65 IN | BODY MASS INDEX: 46.72 KG/M2

## 2018-04-30 DIAGNOSIS — R10.13 POSTPRANDIAL EPIGASTRIC PAIN: ICD-10-CM

## 2018-04-30 DIAGNOSIS — R11.0 CHRONIC NAUSEA: Primary | ICD-10-CM

## 2018-04-30 DIAGNOSIS — R13.10 DYSPHAGIA, UNSPECIFIED TYPE: ICD-10-CM

## 2018-04-30 DIAGNOSIS — K21.9 CHRONIC GERD: ICD-10-CM

## 2018-04-30 DIAGNOSIS — Z80.0 FAMILY HISTORY OF GASTRIC CANCER: ICD-10-CM

## 2018-04-30 DIAGNOSIS — R10.13 DYSPEPSIA: ICD-10-CM

## 2018-04-30 PROCEDURE — 99214 OFFICE O/P EST MOD 30 MIN: CPT | Performed by: NURSE PRACTITIONER

## 2018-04-30 ASSESSMENT — ENCOUNTER SYMPTOMS
ABDOMINAL PAIN: 1
RECTAL PAIN: 0
SORE THROAT: 0
VOICE CHANGE: 0
SHORTNESS OF BREATH: 1
COUGH: 0
NAUSEA: 1
DIARRHEA: 0
ABDOMINAL DISTENTION: 0
BLOOD IN STOOL: 0
CONSTIPATION: 0
VOMITING: 0
CHEST TIGHTNESS: 0
BACK PAIN: 1

## 2018-05-01 ENCOUNTER — ANESTHESIA EVENT (OUTPATIENT)
Dept: ENDOSCOPY | Age: 53
End: 2018-05-01
Payer: MEDICAID

## 2018-05-01 ENCOUNTER — HOSPITAL ENCOUNTER (OUTPATIENT)
Age: 53
Setting detail: OUTPATIENT SURGERY
Discharge: HOME | End: 2018-05-01
Attending: INTERNAL MEDICINE | Admitting: INTERNAL MEDICINE
Payer: MEDICAID

## 2018-05-01 ENCOUNTER — ANESTHESIA (OUTPATIENT)
Dept: ENDOSCOPY | Age: 53
End: 2018-05-01
Payer: MEDICAID

## 2018-05-01 VITALS
HEART RATE: 76 BPM | SYSTOLIC BLOOD PRESSURE: 106 MMHG | HEIGHT: 65 IN | WEIGHT: 280 LBS | OXYGEN SATURATION: 100 % | BODY MASS INDEX: 46.65 KG/M2 | DIASTOLIC BLOOD PRESSURE: 63 MMHG | TEMPERATURE: 96.8 F | RESPIRATION RATE: 18 BRPM

## 2018-05-01 VITALS — SYSTOLIC BLOOD PRESSURE: 100 MMHG | DIASTOLIC BLOOD PRESSURE: 44 MMHG | OXYGEN SATURATION: 93 %

## 2018-05-01 PROCEDURE — 7100000010 HC PHASE II RECOVERY - FIRST 15 MIN: Performed by: INTERNAL MEDICINE

## 2018-05-01 PROCEDURE — 43248 EGD GUIDE WIRE INSERTION: CPT | Performed by: INTERNAL MEDICINE

## 2018-05-01 PROCEDURE — 6360000002 HC RX W HCPCS: Performed by: NURSE ANESTHETIST, CERTIFIED REGISTERED

## 2018-05-01 PROCEDURE — 87077 CULTURE AEROBIC IDENTIFY: CPT

## 2018-05-01 PROCEDURE — 3609012400 HC EGD TRANSORAL BIOPSY SINGLE/MULTIPLE: Performed by: INTERNAL MEDICINE

## 2018-05-01 PROCEDURE — 43239 EGD BIOPSY SINGLE/MULTIPLE: CPT | Performed by: INTERNAL MEDICINE

## 2018-05-01 PROCEDURE — 3700000000 HC ANESTHESIA ATTENDED CARE: Performed by: INTERNAL MEDICINE

## 2018-05-01 PROCEDURE — 88305 TISSUE EXAM BY PATHOLOGIST: CPT

## 2018-05-01 PROCEDURE — 2500000003 HC RX 250 WO HCPCS: Performed by: NURSE ANESTHETIST, CERTIFIED REGISTERED

## 2018-05-01 PROCEDURE — 2500000003 HC RX 250 WO HCPCS: Performed by: INTERNAL MEDICINE

## 2018-05-01 PROCEDURE — 2580000003 HC RX 258: Performed by: INTERNAL MEDICINE

## 2018-05-01 PROCEDURE — 3700000001 HC ADD 15 MINUTES (ANESTHESIA): Performed by: INTERNAL MEDICINE

## 2018-05-01 PROCEDURE — 7100000011 HC PHASE II RECOVERY - ADDTL 15 MIN: Performed by: INTERNAL MEDICINE

## 2018-05-01 RX ORDER — PROPOFOL 10 MG/ML
INJECTION, EMULSION INTRAVENOUS PRN
Status: DISCONTINUED | OUTPATIENT
Start: 2018-05-01 | End: 2018-05-01 | Stop reason: SDUPTHER

## 2018-05-01 RX ORDER — LIDOCAINE HYDROCHLORIDE 10 MG/ML
INJECTION, SOLUTION INFILTRATION; PERINEURAL PRN
Status: DISCONTINUED | OUTPATIENT
Start: 2018-05-01 | End: 2018-05-01 | Stop reason: SDUPTHER

## 2018-05-01 RX ORDER — BETHANECHOL CHLORIDE 25 MG/1
25 TABLET ORAL 2 TIMES DAILY
Qty: 60 TABLET | Refills: 5 | Status: SHIPPED | OUTPATIENT
Start: 2018-05-01 | End: 2018-10-12 | Stop reason: SDUPTHER

## 2018-05-01 RX ORDER — SODIUM CHLORIDE, SODIUM LACTATE, POTASSIUM CHLORIDE, CALCIUM CHLORIDE 600; 310; 30; 20 MG/100ML; MG/100ML; MG/100ML; MG/100ML
INJECTION, SOLUTION INTRAVENOUS CONTINUOUS
Status: DISCONTINUED | OUTPATIENT
Start: 2018-05-01 | End: 2018-05-01 | Stop reason: HOSPADM

## 2018-05-01 RX ORDER — LIDOCAINE HYDROCHLORIDE 10 MG/ML
1 INJECTION, SOLUTION EPIDURAL; INFILTRATION; INTRACAUDAL; PERINEURAL ONCE
Status: COMPLETED | OUTPATIENT
Start: 2018-05-01 | End: 2018-05-01

## 2018-05-01 RX ORDER — MIDAZOLAM HYDROCHLORIDE 1 MG/ML
INJECTION INTRAMUSCULAR; INTRAVENOUS PRN
Status: DISCONTINUED | OUTPATIENT
Start: 2018-05-01 | End: 2018-05-01 | Stop reason: SDUPTHER

## 2018-05-01 RX ORDER — ONDANSETRON 2 MG/ML
INJECTION INTRAMUSCULAR; INTRAVENOUS PRN
Status: DISCONTINUED | OUTPATIENT
Start: 2018-05-01 | End: 2018-05-01 | Stop reason: SDUPTHER

## 2018-05-01 RX ADMIN — SODIUM CHLORIDE, POTASSIUM CHLORIDE, SODIUM LACTATE AND CALCIUM CHLORIDE: 600; 310; 30; 20 INJECTION, SOLUTION INTRAVENOUS at 08:20

## 2018-05-01 RX ADMIN — MIDAZOLAM HYDROCHLORIDE 2 MG: 1 INJECTION, SOLUTION INTRAMUSCULAR; INTRAVENOUS at 08:55

## 2018-05-01 RX ADMIN — LIDOCAINE HYDROCHLORIDE 1 ML: 10 INJECTION, SOLUTION EPIDURAL; INFILTRATION; INTRACAUDAL; PERINEURAL at 08:20

## 2018-05-01 RX ADMIN — PROPOFOL 280 MG: 10 INJECTION, EMULSION INTRAVENOUS at 08:59

## 2018-05-01 RX ADMIN — ONDANSETRON HYDROCHLORIDE 4 MG: 2 SOLUTION INTRAMUSCULAR; INTRAVENOUS at 08:56

## 2018-05-01 RX ADMIN — LIDOCAINE HYDROCHLORIDE 40 MG: 10 INJECTION, SOLUTION INFILTRATION; PERINEURAL at 08:59

## 2018-05-01 ASSESSMENT — PAIN - FUNCTIONAL ASSESSMENT: PAIN_FUNCTIONAL_ASSESSMENT: 0-10

## 2018-05-01 ASSESSMENT — ENCOUNTER SYMPTOMS: SHORTNESS OF BREATH: 1

## 2018-05-02 LAB — CLOTEST: NEGATIVE

## 2018-05-03 ENCOUNTER — TELEPHONE (OUTPATIENT)
Dept: GASTROENTEROLOGY | Age: 53
End: 2018-05-03

## 2018-08-23 RX ORDER — VALACYCLOVIR HYDROCHLORIDE 500 MG/1
500 TABLET, FILM COATED ORAL 2 TIMES DAILY
Qty: 10 TABLET | Refills: 1 | Status: SHIPPED | OUTPATIENT
Start: 2018-08-23 | End: 2019-03-06 | Stop reason: SDUPTHER

## 2018-09-12 ENCOUNTER — OFFICE VISIT (OUTPATIENT)
Dept: FAMILY MEDICINE CLINIC | Age: 53
End: 2018-09-12
Payer: MEDICAID

## 2018-09-12 VITALS
HEART RATE: 82 BPM | TEMPERATURE: 98.3 F | BODY MASS INDEX: 45.98 KG/M2 | SYSTOLIC BLOOD PRESSURE: 120 MMHG | OXYGEN SATURATION: 98 % | HEIGHT: 65 IN | WEIGHT: 276 LBS | DIASTOLIC BLOOD PRESSURE: 78 MMHG

## 2018-09-12 DIAGNOSIS — M19.041 PRIMARY OSTEOARTHRITIS OF BOTH HANDS: Primary | ICD-10-CM

## 2018-09-12 DIAGNOSIS — M19.042 PRIMARY OSTEOARTHRITIS OF BOTH HANDS: Primary | ICD-10-CM

## 2018-09-12 DIAGNOSIS — I10 ESSENTIAL HYPERTENSION: ICD-10-CM

## 2018-09-12 DIAGNOSIS — E74.39 GLUCOSE INTOLERANCE: ICD-10-CM

## 2018-09-12 DIAGNOSIS — M19.042 PRIMARY OSTEOARTHRITIS OF BOTH HANDS: ICD-10-CM

## 2018-09-12 DIAGNOSIS — M19.041 PRIMARY OSTEOARTHRITIS OF BOTH HANDS: ICD-10-CM

## 2018-09-12 LAB
ALBUMIN SERPL-MCNC: 3.9 G/DL (ref 3.5–5.2)
ALP BLD-CCNC: 96 U/L (ref 35–104)
ALT SERPL-CCNC: 28 U/L (ref 5–33)
ANION GAP SERPL CALCULATED.3IONS-SCNC: 14 MMOL/L (ref 7–19)
AST SERPL-CCNC: 22 U/L (ref 5–32)
BACTERIA: ABNORMAL /HPF
BILIRUB SERPL-MCNC: <0.2 MG/DL (ref 0.2–1.2)
BILIRUBIN URINE: NEGATIVE
BLOOD, URINE: ABNORMAL
BUN BLDV-MCNC: 13 MG/DL (ref 6–20)
CALCIUM SERPL-MCNC: 9.4 MG/DL (ref 8.6–10)
CHLORIDE BLD-SCNC: 105 MMOL/L (ref 98–111)
CLARITY: ABNORMAL
CO2: 24 MMOL/L (ref 22–29)
COLOR: YELLOW
CREAT SERPL-MCNC: 0.6 MG/DL (ref 0.5–0.9)
CREATININE URINE: 98.2 MG/DL (ref 4.2–622)
EPITHELIAL CELLS, UA: 9 /HPF (ref 0–5)
GFR NON-AFRICAN AMERICAN: >60
GLUCOSE BLD-MCNC: 142 MG/DL (ref 74–109)
GLUCOSE URINE: NEGATIVE MG/DL
HBA1C MFR BLD: 6.4 % (ref 4–6)
HCT VFR BLD CALC: 36.5 % (ref 37–47)
HEMOGLOBIN: 11.6 G/DL (ref 12–16)
HYALINE CASTS: 2 /HPF (ref 0–8)
KETONES, URINE: NEGATIVE MG/DL
LEUKOCYTE ESTERASE, URINE: ABNORMAL
MCH RBC QN AUTO: 28.9 PG (ref 27–31)
MCHC RBC AUTO-ENTMCNC: 31.8 G/DL (ref 33–37)
MCV RBC AUTO: 90.8 FL (ref 81–99)
NITRITE, URINE: NEGATIVE
PDW BLD-RTO: 12.6 % (ref 11.5–14.5)
PH UA: 6
PLATELET # BLD: 262 K/UL (ref 130–400)
PMV BLD AUTO: 11.1 FL (ref 9.4–12.3)
POTASSIUM SERPL-SCNC: 3.9 MMOL/L (ref 3.5–5)
PROTEIN PROTEIN: 15 MG/DL (ref 15–45)
PROTEIN UA: NEGATIVE MG/DL
RBC # BLD: 4.02 M/UL (ref 4.2–5.4)
RBC UA: 9 /HPF (ref 0–4)
SODIUM BLD-SCNC: 143 MMOL/L (ref 136–145)
SPECIFIC GRAVITY UA: 1.02
TOTAL PROTEIN: 7.2 G/DL (ref 6.6–8.7)
UROBILINOGEN, URINE: 1 E.U./DL
WBC # BLD: 5.5 K/UL (ref 4.8–10.8)
WBC UA: 14 /HPF (ref 0–5)

## 2018-09-12 PROCEDURE — 99214 OFFICE O/P EST MOD 30 MIN: CPT | Performed by: INTERNAL MEDICINE

## 2018-09-12 RX ORDER — MELOXICAM 7.5 MG/1
7.5 TABLET ORAL DAILY
Qty: 30 TABLET | Refills: 3 | Status: SHIPPED | OUTPATIENT
Start: 2018-09-12 | End: 2019-06-06

## 2018-09-12 RX ORDER — ATORVASTATIN CALCIUM 40 MG/1
40 TABLET, FILM COATED ORAL DAILY
Qty: 30 TABLET | Refills: 3 | Status: SHIPPED | OUTPATIENT
Start: 2018-09-12 | End: 2019-01-31 | Stop reason: SDUPTHER

## 2018-09-12 RX ORDER — NYSTATIN 100000 [USP'U]/G
POWDER TOPICAL
Qty: 30 G | Refills: 5 | Status: SHIPPED | OUTPATIENT
Start: 2018-09-12 | End: 2019-06-06 | Stop reason: SDUPTHER

## 2018-09-12 RX ORDER — ROPINIROLE 0.5 MG/1
TABLET, FILM COATED ORAL
Qty: 30 TABLET | Refills: 5 | Status: SHIPPED | OUTPATIENT
Start: 2018-09-12 | End: 2019-03-05 | Stop reason: SDUPTHER

## 2018-09-12 RX ORDER — NYSTATIN AND TRIAMCINOLONE ACETONIDE 100000; 1 [USP'U]/G; MG/G
OINTMENT TOPICAL
Qty: 1 TUBE | Refills: 2 | Status: SHIPPED | OUTPATIENT
Start: 2018-09-12 | End: 2019-06-06

## 2018-09-12 ASSESSMENT — PATIENT HEALTH QUESTIONNAIRE - PHQ9
SUM OF ALL RESPONSES TO PHQ QUESTIONS 1-9: 0
1. LITTLE INTEREST OR PLEASURE IN DOING THINGS: 0
SUM OF ALL RESPONSES TO PHQ QUESTIONS 1-9: 0
2. FEELING DOWN, DEPRESSED OR HOPELESS: 0
SUM OF ALL RESPONSES TO PHQ9 QUESTIONS 1 & 2: 0

## 2018-09-12 NOTE — PATIENT INSTRUCTIONS
We will follow up on blood and urine studies. Continue current medications. Follow up again in 4 months.

## 2018-09-12 NOTE — PROGRESS NOTES
constipation, diarrhea, nausea and vomiting. Genitourinary: Negative for difficulty urinating and dysuria. Musculoskeletal: Negative for arthralgias and back pain. Neurological: Negative for dizziness and headaches. Psychiatric/Behavioral: The patient is nervous/anxious. Objective:     Physical Exam   Constitutional: She is oriented to person, place, and time. She appears well-developed and well-nourished. HENT:   Head: Normocephalic and atraumatic. Mouth/Throat: Oropharynx is clear and moist.   Eyes: Pupils are equal, round, and reactive to light. Conjunctivae are normal.   Cardiovascular: Normal rate, regular rhythm and normal heart sounds. Pulmonary/Chest: Effort normal and breath sounds normal.   Abdominal: Soft. Musculoskeletal: Normal range of motion. Neurological: She is alert and oriented to person, place, and time. Skin: Skin is warm and dry. Vitals reviewed. /78   Pulse 82   Temp 98.3 °F (36.8 °C)   Ht 5' 5\" (1.651 m)   Wt 276 lb (125.2 kg)   SpO2 98%   BMI 45.93 kg/m²     Assessment:       Diagnosis Orders   1. Primary osteoarthritis of both hands  CBC    Comprehensive Metabolic Panel    Creatinine, Random Urine    Hemoglobin A1C    Urinalysis    Protein, urine, random   2. Essential hypertension  CBC    Comprehensive Metabolic Panel    Creatinine, Random Urine    Hemoglobin A1C    Urinalysis    Protein, urine, random   3. Glucose intolerance  CBC    Comprehensive Metabolic Panel    Creatinine, Random Urine    Hemoglobin A1C    Urinalysis    Protein, urine, random             Plan:      Return in about 4 months (around 1/12/2019). Patient Instructions   We will follow up on blood and urine studies. Continue current medications. Follow up again in 4 months.       Orders Placed This Encounter   Procedures    CBC     Standing Status:   Future     Number of Occurrences:   1     Standing Expiration Date:   9/12/2019    Comprehensive Metabolic Panel     Standing

## 2018-09-13 RX ORDER — ONDANSETRON 4 MG/1
4 TABLET, FILM COATED ORAL EVERY 8 HOURS PRN
Qty: 60 TABLET | Refills: 1 | Status: SHIPPED | OUTPATIENT
Start: 2018-09-13 | End: 2019-02-04 | Stop reason: SDUPTHER

## 2018-09-25 ASSESSMENT — ENCOUNTER SYMPTOMS
VOMITING: 0
SINUS PRESSURE: 0
SHORTNESS OF BREATH: 0
DIARRHEA: 0
BACK PAIN: 0
SINUS PAIN: 0
NAUSEA: 0
CONSTIPATION: 0
COUGH: 0

## 2018-10-15 RX ORDER — BETHANECHOL CHLORIDE 25 MG/1
25 TABLET ORAL 2 TIMES DAILY
Qty: 60 TABLET | Refills: 5 | Status: SHIPPED | OUTPATIENT
Start: 2018-10-15 | End: 2019-01-14

## 2018-10-30 ENCOUNTER — HOSPITAL ENCOUNTER (OUTPATIENT)
Dept: NEUROLOGY | Age: 53
Discharge: HOME | End: 2018-10-30
Payer: MEDICAID

## 2018-10-30 PROCEDURE — 95911 NRV CNDJ TEST 9-10 STUDIES: CPT

## 2018-10-30 PROCEDURE — 95911 NRV CNDJ TEST 9-10 STUDIES: CPT | Performed by: PSYCHIATRY & NEUROLOGY

## 2018-10-30 PROCEDURE — 95886 MUSC TEST DONE W/N TEST COMP: CPT

## 2018-10-30 PROCEDURE — 95886 MUSC TEST DONE W/N TEST COMP: CPT | Performed by: PSYCHIATRY & NEUROLOGY

## 2018-11-13 ENCOUNTER — APPOINTMENT (OUTPATIENT)
Dept: PREADMISSION TESTING | Facility: HOSPITAL | Age: 53
End: 2018-11-13

## 2018-11-13 VITALS
SYSTOLIC BLOOD PRESSURE: 154 MMHG | OXYGEN SATURATION: 94 % | WEIGHT: 267.86 LBS | BODY MASS INDEX: 45.73 KG/M2 | HEIGHT: 64 IN | DIASTOLIC BLOOD PRESSURE: 74 MMHG | HEART RATE: 77 BPM

## 2018-11-13 LAB
ANION GAP SERPL CALCULATED.3IONS-SCNC: 10 MMOL/L (ref 4–13)
BUN BLD-MCNC: 11 MG/DL (ref 5–21)
BUN/CREAT SERPL: 17.5 (ref 7–25)
CALCIUM SPEC-SCNC: 9.3 MG/DL (ref 8.4–10.4)
CHLORIDE SERPL-SCNC: 102 MMOL/L (ref 98–110)
CO2 SERPL-SCNC: 28 MMOL/L (ref 24–31)
CREAT BLD-MCNC: 0.63 MG/DL (ref 0.5–1.4)
DEPRECATED RDW RBC AUTO: 38.8 FL (ref 40–54)
ERYTHROCYTE [DISTWIDTH] IN BLOOD BY AUTOMATED COUNT: 12.3 % (ref 12–15)
GFR SERPL CREATININE-BSD FRML MDRD: 98 ML/MIN/1.73
GLUCOSE BLD-MCNC: 98 MG/DL (ref 70–100)
HCT VFR BLD AUTO: 36.3 % (ref 37–47)
HGB BLD-MCNC: 12.4 G/DL (ref 12–16)
MCH RBC QN AUTO: 29.6 PG (ref 28–32)
MCHC RBC AUTO-ENTMCNC: 34.2 G/DL (ref 33–36)
MCV RBC AUTO: 86.6 FL (ref 82–98)
PLATELET # BLD AUTO: 268 10*3/MM3 (ref 130–400)
PMV BLD AUTO: 10.6 FL (ref 6–12)
POTASSIUM BLD-SCNC: 3.9 MMOL/L (ref 3.5–5.3)
RBC # BLD AUTO: 4.19 10*6/MM3 (ref 4.2–5.4)
SODIUM BLD-SCNC: 140 MMOL/L (ref 135–145)
WBC NRBC COR # BLD: 4.88 10*3/MM3 (ref 4.8–10.8)

## 2018-11-13 PROCEDURE — 85027 COMPLETE CBC AUTOMATED: CPT | Performed by: ORTHOPAEDIC SURGERY

## 2018-11-13 PROCEDURE — 93010 ELECTROCARDIOGRAM REPORT: CPT | Performed by: INTERNAL MEDICINE

## 2018-11-13 PROCEDURE — 80048 BASIC METABOLIC PNL TOTAL CA: CPT | Performed by: ORTHOPAEDIC SURGERY

## 2018-11-13 PROCEDURE — 36415 COLL VENOUS BLD VENIPUNCTURE: CPT

## 2018-11-13 PROCEDURE — 93005 ELECTROCARDIOGRAM TRACING: CPT

## 2018-11-13 RX ORDER — BETHANECHOL CHLORIDE 25 MG/1
25 TABLET ORAL 2 TIMES DAILY
COMMUNITY
End: 2022-09-15

## 2018-11-13 RX ORDER — MELOXICAM 7.5 MG/1
7.5 TABLET ORAL DAILY
COMMUNITY
End: 2022-09-15

## 2018-11-13 RX ORDER — SUCRALFATE 1 G/1
1 TABLET ORAL 4 TIMES DAILY
COMMUNITY
End: 2022-09-15

## 2018-11-13 RX ORDER — NYSTATIN 100000 [USP'U]/G
1 POWDER TOPICAL 3 TIMES DAILY
COMMUNITY
End: 2022-09-15

## 2018-11-13 NOTE — DISCHARGE INSTRUCTIONS
DAY OF SURGERY INSTRUCTIONS  CARPAL TUNNEL RELEASE    DR PATEL        DATE OF SURGERY: NOV 21 2018    ARRIVAL TIME: AS DIRECTED BY OFFICE    DAY OF SURGERY TAKE ONLY THESE MEDICATIONS UNLESS OTHERWISE INSTRUCTED BY YOUR PHYSICIAN: VALIUM AND HYDROCODONE        MANAGING PAIN AFTER SURGERY    We know you are probably wondering what your pain will be like after surgery.  Following surgery it is unrealistic to expect you will not have pain.   Pain is how our bodies let us know that something is wrong or cautions us to be careful.  That said, our goal is to make your pain tolerable.    Methods we may use to treat your pain include (oral or IV medications, PCAs, epidurals, nerve blocks, etc.)   While some procedures require IV pain medications for a short time after surgery, transitioning to pain medications by mouth allows for better management of pain.   Your nurse will encourage you to take oral pain medications whenever possible.  IV medications work almost immediately, but only last a short while.  Taking medications by mouth allows for a more constant level of medication in your blood stream for a longer period of time.      Once your pain is out of control it is harder to get back under control.  It is important you are aware when your next dose of pain medication is due.  If you are admitted, your nurse may write the time of your next dose on the white board in your room to help you remember.      We are interested in your pain and encourage you to inform us about aggravating factors during your visit.   Many times a simple repositioning every few hours can make a big difference.    If your physician says it is okay, do not let your pain prevent you from getting out of bed. Be sure to call your nurse for assistance prior to getting up so you do not fall.      Before surgery, please decide your tolerable pain goal.  These faces help describe the pain ratings we use on a 0-10 scale.   Be prepared to tell us your  goal and whether or not you take pain or anxiety medications at home.          BEFORE YOU COME TO THE HOSPITAL  (Pre-op instructions)  • Do not eat, drink, smoke or chew gum after midnight the night before surgery.  This also includes no mints.  • Morning of surgery take only the medicines you have been instructed with a sip of water unless otherwise instructed  by your physician.  • Do not shave, wear makeup or dark nail polish.  • Remove all jewelry including rings.  • Leave anything you consider valuable at home.  • Leave your suitcase in the car until after your surgery.  • Bring the following with you if applicable:  o Picture ID and insurance, Medicare or Medicaid cards  o Co-pay/deductible required by insurance (cash, check, credit card)  o Copy of advance directive, living will or power-of- documents if not brought to PAT  o CPAP or BIPAP mask and tubing  o Relaxation aids (MP3 player, book, magazine)  • On the day of surgery check in at registration located at the main entrance of the hospital.       Outpatient Surgery Guidelines, Adult  Outpatient procedures are those for which the person having the procedure is allowed to go home the same day as the procedure. Various procedures are done on an outpatient basis. You should follow some general guidelines if you will be having an outpatient procedure.  LET YOUR HEALTH CARE PROVIDER KNOW ABOUT:  · Any allergies you have.  · All medicines you are taking, including vitamins, herbs, eye drops, creams, and over-the-counter medicines.  · Previous problems you or members of your family have had with the use of anesthetics.  · Any blood disorders you have.  · Previous surgeries you have had.  · Medical conditions you have.  RISKS AND COMPLICATIONS  Your health care provider will discuss possible risks and complications with you before surgery. Common risks and complications include:    · Problems due to the use of anesthetics.  · Blood loss and replacement  (does not apply to minor surgical procedures).  · Temporary increase in pain due to surgery.  · Uncorrected pain or problems that the surgery was meant to correct.  · Infection.  · New damage.  BEFORE THE PROCEDURE  · Ask your health care provider about changing or stopping your regular medicines. You may need to stop taking certain medicines in the days or weeks before the procedure.  · Stop smoking at least 2 weeks before surgery. This lowers your risk for complications during and after surgery. Ask your health care provider for help with this if needed.  · Eat your usual meals and a light supper the day before surgery. Continue fluid intake. Do not drink alcohol.  · Do not eat or drink after midnight the night before your surgery.   · Arrange for someone to take you home and to stay with you for 24 hours after the procedure. Medicine given for your procedure may affect your ability to drive or to care for yourself.  · Call your health care provider's office if you develop an illness or problem that may prevent you from safely having your procedure.  AFTER THE PROCEDURE  After surgery, you will be taken to a recovery area, where your progress will be monitored. If there are no complications, you will be allowed to go home when you are awake, stable, and taking fluids well. You may have numbness around the surgical site. Healing will take some time. You will have tenderness at the surgical site and may have some swelling and bruising. You may also have some nausea.  HOME CARE INSTRUCTIONS  · Do not drive for 24 hours, or as directed by your health care provider. Do not drive while taking prescription pain medicines.  · Do not drink alcohol for 24 hours.  · Do not make important decisions or sign legal documents for 24 hours.  · You may resume a normal diet and activities as directed.  · Do not lift anything heavier than 10 pounds (4.5 kg) or play contact sports until your health care provider says it is  okay.  · Change your bandages (dressings) as directed.  · Only take over-the-counter or prescription medicines as directed by your health care provider.  · Follow up with your health care provider as directed.  SEEK MEDICAL CARE IF:  · You have increased bleeding (more than a small spot) from the surgical site.  · You have redness, swelling, or increasing pain in the wound.  · You see pus coming from the wound.  · You have a fever.  · You notice a bad smell coming from the wound or dressing.  · You feel lightheaded or faint.  · You develop a rash.  · You have trouble breathing.  · You develop allergies.  MAKE SURE YOU:  · Understand these instructions.  · Will watch your condition.  · Will get help right away if you are not doing well or get worse.     This information is not intended to replace advice given to you by your health care provider. Make sure you discuss any questions you have with your health care provider.     Document Released: 09/12/2002 Document Revised: 05/03/2016 Document Reviewed: 05/22/2014  [employer] Interactive Patient Education ©2016 [employer]       Fall Prevention in Hospitals, Adult  As a hospital patient, your condition and the treatments you receive can increase your risk for falls. Some additional risk factors for falls in a hospital include:  · Being in an unfamiliar environment.  · Being on bed rest.  · Your surgery.  · Taking certain medicines.  · Your tubing requirements, such as intravenous (IV) therapy or catheters.  It is important that you learn how to decrease fall risks while at the hospital. Below are important tips that can help prevent falls.  SAFETY TIPS FOR PREVENTING FALLS  Talk about your risk of falling.  · Ask your health care provider why you are at risk for falling. Is it your medicine, illness, tubing placement, or something else?  · Make a plan with your health care provider to keep you safe from falls.  · Ask your health care provider or pharmacist about side  effects of your medicines. Some medicines can make you dizzy or affect your coordination.  Ask for help.  · Ask for help before getting out of bed. You may need to press your call button.  · Ask for assistance in getting safely to the toilet.  · Ask for a walker or cane to be put at your bedside. Ask that most of the side rails on your bed be placed up before your health care provider leaves the room.  · Ask family or friends to sit with you.  · Ask for things that are out of your reach, such as your glasses, hearing aids, telephone, bedside table, or call button.  Follow these tips to avoid falling:  · Stay lying or seated, rather than standing, while waiting for help.  · Wear rubber-soled slippers or shoes whenever you walk in the hospital.  · Avoid quick, sudden movements.  ¨ Change positions slowly.  ¨ Sit on the side of your bed before standing.  ¨ Stand up slowly and wait before you start to walk.  · Let your health care provider know if there is a spill on the floor.  · Pay careful attention to the medical equipment, electrical cords, and tubes around you.  · When you need help, use your call button by your bed or in the bathroom. Wait for one of your health care providers to help you.  · If you feel dizzy or unsure of your footing, return to bed and wait for assistance.  · Avoid being distracted by the TV, telephone, or another person in your room.  · Do not lean or support yourself on rolling objects, such as IV poles or bedside tables.     This information is not intended to replace advice given to you by your health care provider. Make sure you discuss any questions you have with your health care provider.     Document Released: 12/15/2001 Document Revised: 01/08/2016 Document Reviewed: 08/25/2013  [employer] Interactive Patient Education ©2016 [employer]       Surgical Site Infections FAQs  What is a Surgical Site Infection (SSI)?  A surgical site infection is an infection that occurs after surgery in  the part of the body where the surgery took place. Most patients who have surgery do not develop an infection. However, infections develop in about 1 to 3 out of every 100 patients who have surgery.  Some of the common symptoms of a surgical site infection are:  · Redness and pain around the area where you had surgery  · Drainage of cloudy fluid from your surgical wound  · Fever  Can SSIs be treated?  Yes. Most surgical site infections can be treated with antibiotics. The antibiotic given to you depends on the bacteria (germs) causing the infection. Sometimes patients with SSIs also need another surgery to treat the infection.  What are some of the things that hospitals are doing to prevent SSIs?  To prevent SSIs, doctors, nurses, and other healthcare providers:  · Clean their hands and arms up to their elbows with an antiseptic agent just before the surgery.  · Clean their hands with soap and water or an alcohol-based hand rub before and after caring for each patient.  · May remove some of your hair immediately before your surgery using electric clippers if the hair is in the same area where the procedure will occur. They should not shave you with a razor.  · Wear special hair covers, masks, gowns, and gloves during surgery to keep the surgery area clean.  · Give you antibiotics before your surgery starts. In most cases, you should get antibiotics within 60 minutes before the surgery starts and the antibiotics should be stopped within 24 hours after surgery.  · Clean the skin at the site of your surgery with a special soap that kills germs.  What can I do to help prevent SSIs?  Before your surgery:  · Tell your doctor about other medical problems you may have. Health problems such as allergies, diabetes, and obesity could affect your surgery and your treatment.  · Quit smoking. Patients who smoke get more infections. Talk to your doctor about how you can quit before your surgery.  · Do not shave near where you will  have surgery. Shaving with a razor can irritate your skin and make it easier to develop an infection.  At the time of your surgery:  · Speak up if someone tries to shave you with a razor before surgery. Ask why you need to be shaved and talk with your surgeon if you have any concerns.  · Ask if you will get antibiotics before surgery.  After your surgery:  · Make sure that your healthcare providers clean their hands before examining you, either with soap and water or an alcohol-based hand rub.  · If you do not see your providers clean their hands, please ask them to do so.  · Family and friends who visit you should not touch the surgical wound or dressings.  · Family and friends should clean their hands with soap and water or an alcohol-based hand rub before and after visiting you. If you do not see them clean their hands, ask them to clean their hands.  What do I need to do when I go home from the hospital?  · Before you go home, your doctor or nurse should explain everything you need to know about taking care of your wound. Make sure you understand how to care for your wound before you leave the hospital.  · Always clean your hands before and after caring for your wound.  · Before you go home, make sure you know who to contact if you have questions or problems after you get home.  · If you have any symptoms of an infection, such as redness and pain at the surgery site, drainage, or fever, call your doctor immediately.  If you have additional questions, please ask your doctor or nurse.  Developed and co-sponsored by The Society for Healthcare Epidemiology of [place] (SHEA); Infectious Diseases Society of [place] (IDSA); American Hospital Association; Association for Professionals in Infection Control and Epidemiology (APIC); Centers for Disease Control and Prevention (CDC); and The Joint Commission.     This information is not intended to replace advice given to you by your health care provider. Make sure you  discuss any questions you have with your health care provider.     Document Released: 12/23/2014 Document Revised: 01/08/2016 Document Reviewed: 03/02/2016  [employer] Interactive Patient Education ©2016 [employer]       [facility]  CHG 4% Patient Instruction Sheet    Preparing the Skin Before Surgery  Preparing or “prepping” skin before surgery can reduce the risk of infection at the surgical site. To make the process easier,[facility] has chosen 4% Chlorhexidine Gluconate (CHG) antiseptic solution.   The steps below outline the prepping process and should be carefully followed.                                                                                                                                                      Prep the skin at the following time(s):                                                      We recommend you shower the night before surgery, and again the morning of surgery with the 4% CHG antiseptic solution using half of the bottle and a cloth each time.  Dress in clean clothes/sleepwear after showering.  See instructions below for application.          Do not apply any lotions or moisturizers.       Do not shave the area to be prepped for at least 2 days prior to surgery.    Clipping the hair may be done immediately prior to your surgery at the hospital    if needed.    Directions:  Thoroughly rinse your body with water.  Apply 4% CHG to a cloth and wash skin gently, paying special attention to the operative site.  Rinse again thoroughly.  Once you have begun using this product do not apply anything else to your skin. If itching or redness persists, rinse affected areas and discontinue use.    When using this product:  • Keep out of eyes, ears, and mouth.  • If solution should contact these areas, rinse out promptly and thoroughly with water.  • For external use only.  • Do not use in genital area, on your face or head.      PATIENT/FAMILY/RESPONSIBLE PARTY VERBALIZES UNDERSTANDING  OF ABOVE EDUCATION.  COPY OF PAIN SCALE GIVEN AND REVIEWED WITH VERBALIZED UNDERSTANDING.

## 2019-01-14 ENCOUNTER — HOSPITAL ENCOUNTER (OUTPATIENT)
Dept: PREADMISSION TESTING | Age: 54
Setting detail: OUTPATIENT SURGERY
Discharge: HOME | End: 2019-01-18

## 2019-01-14 VITALS — WEIGHT: 258.5 LBS | HEIGHT: 65 IN | BODY MASS INDEX: 43.07 KG/M2

## 2019-01-18 ENCOUNTER — ANESTHESIA EVENT (OUTPATIENT)
Dept: OPERATING ROOM | Age: 54
End: 2019-01-18

## 2019-01-25 ENCOUNTER — ANESTHESIA (OUTPATIENT)
Dept: OPERATING ROOM | Age: 54
End: 2019-01-25

## 2019-01-25 ENCOUNTER — HOSPITAL ENCOUNTER (OUTPATIENT)
Age: 54
Setting detail: OUTPATIENT SURGERY
Discharge: HOME | End: 2019-01-25
Attending: ORTHOPAEDIC SURGERY | Admitting: ORTHOPAEDIC SURGERY

## 2019-01-25 VITALS
TEMPERATURE: 97 F | RESPIRATION RATE: 10 BRPM | DIASTOLIC BLOOD PRESSURE: 65 MMHG | SYSTOLIC BLOOD PRESSURE: 105 MMHG | OXYGEN SATURATION: 97 %

## 2019-01-25 VITALS
HEART RATE: 54 BPM | OXYGEN SATURATION: 100 % | TEMPERATURE: 98.3 F | DIASTOLIC BLOOD PRESSURE: 73 MMHG | SYSTOLIC BLOOD PRESSURE: 129 MMHG | RESPIRATION RATE: 18 BRPM | BODY MASS INDEX: 43.07 KG/M2 | WEIGHT: 258.5 LBS | HEIGHT: 65 IN

## 2019-01-25 PROCEDURE — 64721 CARPAL TUNNEL SURGERY: CPT

## 2019-01-25 PROCEDURE — G8907 PT DOC NO EVENTS ON DISCHARG: HCPCS

## 2019-01-25 PROCEDURE — G8918 PT W/O PREOP ORDER IV AB PRO: HCPCS

## 2019-01-25 RX ORDER — PROPOFOL 10 MG/ML
INJECTION, EMULSION INTRAVENOUS PRN
Status: DISCONTINUED | OUTPATIENT
Start: 2019-01-25 | End: 2019-01-25 | Stop reason: SDUPTHER

## 2019-01-25 RX ORDER — ONDANSETRON 2 MG/ML
INJECTION INTRAMUSCULAR; INTRAVENOUS PRN
Status: DISCONTINUED | OUTPATIENT
Start: 2019-01-25 | End: 2019-01-25

## 2019-01-25 RX ORDER — HYDROMORPHONE HCL 110MG/55ML
0.25 PATIENT CONTROLLED ANALGESIA SYRINGE INTRAVENOUS EVERY 5 MIN PRN
Status: DISCONTINUED | OUTPATIENT
Start: 2019-01-25 | End: 2019-01-25 | Stop reason: HOSPADM

## 2019-01-25 RX ORDER — FENTANYL CITRATE 50 UG/ML
INJECTION, SOLUTION INTRAMUSCULAR; INTRAVENOUS PRN
Status: DISCONTINUED | OUTPATIENT
Start: 2019-01-25 | End: 2019-01-25 | Stop reason: SDUPTHER

## 2019-01-25 RX ORDER — MORPHINE SULFATE 10 MG/ML
2 INJECTION, SOLUTION INTRAMUSCULAR; INTRAVENOUS EVERY 5 MIN PRN
Status: DISCONTINUED | OUTPATIENT
Start: 2019-01-25 | End: 2019-01-25 | Stop reason: HOSPADM

## 2019-01-25 RX ORDER — LIDOCAINE HYDROCHLORIDE 10 MG/ML
1 INJECTION, SOLUTION EPIDURAL; INFILTRATION; INTRACAUDAL; PERINEURAL
Status: DISCONTINUED | OUTPATIENT
Start: 2019-01-25 | End: 2019-01-25 | Stop reason: HOSPADM

## 2019-01-25 RX ORDER — ONDANSETRON 2 MG/ML
4 INJECTION INTRAMUSCULAR; INTRAVENOUS EVERY 6 HOURS PRN
Status: DISCONTINUED | OUTPATIENT
Start: 2019-01-25 | End: 2019-01-25 | Stop reason: HOSPADM

## 2019-01-25 RX ORDER — DEXAMETHASONE SODIUM PHOSPHATE 4 MG/ML
INJECTION, SOLUTION INTRA-ARTICULAR; INTRALESIONAL; INTRAMUSCULAR; INTRAVENOUS; SOFT TISSUE PRN
Status: DISCONTINUED | OUTPATIENT
Start: 2019-01-25 | End: 2019-01-25 | Stop reason: SDUPTHER

## 2019-01-25 RX ORDER — MORPHINE SULFATE 10 MG/ML
4 INJECTION, SOLUTION INTRAMUSCULAR; INTRAVENOUS EVERY 5 MIN PRN
Status: DISCONTINUED | OUTPATIENT
Start: 2019-01-25 | End: 2019-01-25 | Stop reason: HOSPADM

## 2019-01-25 RX ORDER — HYDROMORPHONE HCL 110MG/55ML
0.5 PATIENT CONTROLLED ANALGESIA SYRINGE INTRAVENOUS EVERY 5 MIN PRN
Status: DISCONTINUED | OUTPATIENT
Start: 2019-01-25 | End: 2019-01-25 | Stop reason: HOSPADM

## 2019-01-25 RX ORDER — DIPHENHYDRAMINE HYDROCHLORIDE 50 MG/ML
12.5 INJECTION INTRAMUSCULAR; INTRAVENOUS
Status: COMPLETED | OUTPATIENT
Start: 2019-01-25 | End: 2019-01-25

## 2019-01-25 RX ORDER — PROMETHAZINE HYDROCHLORIDE 25 MG/ML
6.25 INJECTION, SOLUTION INTRAMUSCULAR; INTRAVENOUS
Status: DISCONTINUED | OUTPATIENT
Start: 2019-01-25 | End: 2019-01-25 | Stop reason: HOSPADM

## 2019-01-25 RX ORDER — SODIUM CHLORIDE, SODIUM LACTATE, POTASSIUM CHLORIDE, CALCIUM CHLORIDE 600; 310; 30; 20 MG/100ML; MG/100ML; MG/100ML; MG/100ML
INJECTION, SOLUTION INTRAVENOUS CONTINUOUS
Status: DISCONTINUED | OUTPATIENT
Start: 2019-01-25 | End: 2019-01-25 | Stop reason: HOSPADM

## 2019-01-25 RX ORDER — METOCLOPRAMIDE HYDROCHLORIDE 5 MG/ML
10 INJECTION INTRAMUSCULAR; INTRAVENOUS
Status: DISCONTINUED | OUTPATIENT
Start: 2019-01-25 | End: 2019-01-25 | Stop reason: HOSPADM

## 2019-01-25 RX ORDER — MIDAZOLAM HYDROCHLORIDE 1 MG/ML
INJECTION INTRAMUSCULAR; INTRAVENOUS PRN
Status: DISCONTINUED | OUTPATIENT
Start: 2019-01-25 | End: 2019-01-25 | Stop reason: SDUPTHER

## 2019-01-25 RX ORDER — ENALAPRILAT 2.5 MG/2ML
1.25 INJECTION INTRAVENOUS
Status: DISCONTINUED | OUTPATIENT
Start: 2019-01-25 | End: 2019-01-25 | Stop reason: HOSPADM

## 2019-01-25 RX ORDER — KETOROLAC TROMETHAMINE 30 MG/ML
INJECTION, SOLUTION INTRAMUSCULAR; INTRAVENOUS PRN
Status: DISCONTINUED | OUTPATIENT
Start: 2019-01-25 | End: 2019-01-25 | Stop reason: SDUPTHER

## 2019-01-25 RX ORDER — MEPERIDINE HYDROCHLORIDE 25 MG/ML
12.5 INJECTION INTRAMUSCULAR; INTRAVENOUS; SUBCUTANEOUS EVERY 5 MIN PRN
Status: DISCONTINUED | OUTPATIENT
Start: 2019-01-25 | End: 2019-01-25 | Stop reason: HOSPADM

## 2019-01-25 RX ORDER — HYDRALAZINE HYDROCHLORIDE 20 MG/ML
5 INJECTION INTRAMUSCULAR; INTRAVENOUS EVERY 10 MIN PRN
Status: DISCONTINUED | OUTPATIENT
Start: 2019-01-25 | End: 2019-01-25 | Stop reason: HOSPADM

## 2019-01-25 RX ORDER — LIDOCAINE HYDROCHLORIDE 10 MG/ML
INJECTION, SOLUTION INFILTRATION; PERINEURAL PRN
Status: DISCONTINUED | OUTPATIENT
Start: 2019-01-25 | End: 2019-01-25 | Stop reason: SDUPTHER

## 2019-01-25 RX ORDER — LABETALOL HYDROCHLORIDE 5 MG/ML
5 INJECTION, SOLUTION INTRAVENOUS EVERY 10 MIN PRN
Status: DISCONTINUED | OUTPATIENT
Start: 2019-01-25 | End: 2019-01-25 | Stop reason: HOSPADM

## 2019-01-25 RX ADMIN — Medication 0.5 MG: at 10:22

## 2019-01-25 RX ADMIN — FENTANYL CITRATE 50 MCG: 50 INJECTION, SOLUTION INTRAMUSCULAR; INTRAVENOUS at 09:35

## 2019-01-25 RX ADMIN — PROPOFOL 200 MG: 10 INJECTION, EMULSION INTRAVENOUS at 09:20

## 2019-01-25 RX ADMIN — KETOROLAC TROMETHAMINE 30 MG: 30 INJECTION, SOLUTION INTRAMUSCULAR; INTRAVENOUS at 09:24

## 2019-01-25 RX ADMIN — MIDAZOLAM HYDROCHLORIDE 2 MG: 1 INJECTION INTRAMUSCULAR; INTRAVENOUS at 09:15

## 2019-01-25 RX ADMIN — SODIUM CHLORIDE, SODIUM LACTATE, POTASSIUM CHLORIDE, CALCIUM CHLORIDE: 600; 310; 30; 20 INJECTION, SOLUTION INTRAVENOUS at 09:15

## 2019-01-25 RX ADMIN — DEXAMETHASONE SODIUM PHOSPHATE 4 MG: 4 INJECTION, SOLUTION INTRA-ARTICULAR; INTRALESIONAL; INTRAMUSCULAR; INTRAVENOUS; SOFT TISSUE at 09:24

## 2019-01-25 RX ADMIN — Medication 0.5 MG: at 10:11

## 2019-01-25 RX ADMIN — FENTANYL CITRATE 50 MCG: 50 INJECTION, SOLUTION INTRAMUSCULAR; INTRAVENOUS at 09:19

## 2019-01-25 RX ADMIN — DIPHENHYDRAMINE HYDROCHLORIDE 12.5 MG: 50 INJECTION INTRAMUSCULAR; INTRAVENOUS at 10:51

## 2019-01-25 RX ADMIN — ONDANSETRON 4 MG: 2 INJECTION INTRAMUSCULAR; INTRAVENOUS at 08:13

## 2019-01-25 RX ADMIN — LIDOCAINE HYDROCHLORIDE 3 ML: 10 INJECTION, SOLUTION INFILTRATION; PERINEURAL at 09:20

## 2019-01-25 ASSESSMENT — PAIN DESCRIPTION - ORIENTATION
ORIENTATION: RIGHT

## 2019-01-25 ASSESSMENT — PAIN DESCRIPTION - FREQUENCY
FREQUENCY: INTERMITTENT

## 2019-01-25 ASSESSMENT — ENCOUNTER SYMPTOMS: SHORTNESS OF BREATH: 0

## 2019-01-25 ASSESSMENT — PAIN SCALES - GENERAL
PAINLEVEL_OUTOF10: 2
PAINLEVEL_OUTOF10: 7
PAINLEVEL_OUTOF10: 3
PAINLEVEL_OUTOF10: 4
PAINLEVEL_OUTOF10: 3
PAINLEVEL_OUTOF10: 7
PAINLEVEL_OUTOF10: 2

## 2019-01-25 ASSESSMENT — PAIN DESCRIPTION - DESCRIPTORS
DESCRIPTORS: BURNING
DESCRIPTORS: BURNING;ACHING
DESCRIPTORS: BURNING

## 2019-01-25 ASSESSMENT — PAIN DESCRIPTION - PAIN TYPE
TYPE: SURGICAL PAIN

## 2019-01-25 ASSESSMENT — COPD QUESTIONNAIRES: CAT_SEVERITY: NO INTERVAL CHANGE

## 2019-01-25 ASSESSMENT — PAIN DESCRIPTION - LOCATION
LOCATION: HAND

## 2019-01-25 ASSESSMENT — LIFESTYLE VARIABLES: SMOKING_STATUS: 0

## 2019-01-31 RX ORDER — LISINOPRIL 20 MG/1
20 TABLET ORAL DAILY
Qty: 30 TABLET | Refills: 11 | Status: SHIPPED | OUTPATIENT
Start: 2019-01-31 | End: 2020-03-12 | Stop reason: SDUPTHER

## 2019-01-31 RX ORDER — ATORVASTATIN CALCIUM 40 MG/1
TABLET, FILM COATED ORAL
Qty: 30 TABLET | Refills: 3 | Status: SHIPPED | OUTPATIENT
Start: 2019-01-31 | End: 2019-06-12 | Stop reason: SDUPTHER

## 2019-02-05 RX ORDER — ALBUTEROL SULFATE 90 UG/1
AEROSOL, METERED RESPIRATORY (INHALATION)
Qty: 1 INHALER | Refills: 4 | OUTPATIENT
Start: 2019-02-05

## 2019-02-06 RX ORDER — ONDANSETRON 4 MG/1
4 TABLET, FILM COATED ORAL EVERY 8 HOURS PRN
Qty: 60 TABLET | Refills: 1 | Status: SHIPPED | OUTPATIENT
Start: 2019-02-06 | End: 2019-06-06 | Stop reason: SDUPTHER

## 2019-02-25 RX ORDER — ALBUTEROL SULFATE 90 UG/1
AEROSOL, METERED RESPIRATORY (INHALATION)
Qty: 1 INHALER | Refills: 4 | Status: SHIPPED | OUTPATIENT
Start: 2019-02-25 | End: 2023-03-07

## 2019-02-26 RX ORDER — VALACYCLOVIR HYDROCHLORIDE 500 MG/1
500 TABLET, FILM COATED ORAL 2 TIMES DAILY
Qty: 10 TABLET | Refills: 1 | OUTPATIENT
Start: 2019-02-26 | End: 2019-03-03

## 2019-03-05 RX ORDER — ROPINIROLE 0.5 MG/1
TABLET, FILM COATED ORAL
Qty: 30 TABLET | Refills: 5 | Status: SHIPPED | OUTPATIENT
Start: 2019-03-05 | End: 2019-10-09 | Stop reason: SDUPTHER

## 2019-03-06 ENCOUNTER — OFFICE VISIT (OUTPATIENT)
Dept: FAMILY MEDICINE CLINIC | Age: 54
End: 2019-03-06
Payer: MEDICAID

## 2019-03-06 VITALS
SYSTOLIC BLOOD PRESSURE: 130 MMHG | OXYGEN SATURATION: 96 % | BODY MASS INDEX: 43.93 KG/M2 | RESPIRATION RATE: 14 BRPM | DIASTOLIC BLOOD PRESSURE: 76 MMHG | TEMPERATURE: 97.9 F | HEART RATE: 67 BPM | WEIGHT: 264 LBS

## 2019-03-06 DIAGNOSIS — R53.82 CHRONIC FATIGUE: ICD-10-CM

## 2019-03-06 DIAGNOSIS — M19.041 PRIMARY OSTEOARTHRITIS OF BOTH HANDS: ICD-10-CM

## 2019-03-06 DIAGNOSIS — K21.9 CHRONIC GERD: ICD-10-CM

## 2019-03-06 DIAGNOSIS — E74.39 GLUCOSE INTOLERANCE: ICD-10-CM

## 2019-03-06 DIAGNOSIS — I10 ESSENTIAL HYPERTENSION: Primary | ICD-10-CM

## 2019-03-06 DIAGNOSIS — M19.042 PRIMARY OSTEOARTHRITIS OF BOTH HANDS: ICD-10-CM

## 2019-03-06 LAB — TSH SERPL DL<=0.05 MIU/L-ACNC: 2.72 UIU/ML (ref 0.27–4.2)

## 2019-03-06 PROCEDURE — 99214 OFFICE O/P EST MOD 30 MIN: CPT | Performed by: INTERNAL MEDICINE

## 2019-03-06 RX ORDER — NABUMETONE 500 MG/1
500 TABLET, FILM COATED ORAL 2 TIMES DAILY
COMMUNITY
End: 2019-06-06

## 2019-03-06 RX ORDER — VALACYCLOVIR HYDROCHLORIDE 500 MG/1
500 TABLET, FILM COATED ORAL 2 TIMES DAILY
Qty: 10 TABLET | Refills: 5 | Status: SHIPPED | OUTPATIENT
Start: 2019-03-06 | End: 2019-03-11

## 2019-03-06 RX ORDER — TRAZODONE HYDROCHLORIDE 100 MG/1
100 TABLET ORAL NIGHTLY
Qty: 90 TABLET | Refills: 1 | Status: SHIPPED | OUTPATIENT
Start: 2019-03-06 | End: 2019-06-06

## 2019-03-06 ASSESSMENT — ENCOUNTER SYMPTOMS
NAUSEA: 0
DIARRHEA: 0
SHORTNESS OF BREATH: 0
VOMITING: 0
BACK PAIN: 0
CONSTIPATION: 0
COUGH: 0

## 2019-03-11 ENCOUNTER — HOSPITAL ENCOUNTER (OUTPATIENT)
Dept: GENERAL RADIOLOGY | Facility: HOSPITAL | Age: 54
Discharge: HOME | End: 2019-03-11
Admitting: PAIN MEDICINE

## 2019-03-11 ENCOUNTER — TRANSCRIBE ORDERS (OUTPATIENT)
Dept: ADMINISTRATIVE | Facility: HOSPITAL | Age: 54
End: 2019-03-11

## 2019-03-11 DIAGNOSIS — M51.36 DEGENERATION OF LUMBAR INTERVERTEBRAL DISC: Primary | ICD-10-CM

## 2019-03-11 DIAGNOSIS — M47.816 SPONDYLOSIS OF LUMBAR REGION WITHOUT MYELOPATHY OR RADICULOPATHY: ICD-10-CM

## 2019-03-11 DIAGNOSIS — M51.26 DISPLACEMENT OF LUMBAR INTERVERTEBRAL DISC WITHOUT MYELOPATHY: ICD-10-CM

## 2019-03-11 DIAGNOSIS — M51.36 DEGENERATION OF LUMBAR INTERVERTEBRAL DISC: ICD-10-CM

## 2019-03-11 PROCEDURE — 72110 X-RAY EXAM L-2 SPINE 4/>VWS: CPT

## 2019-06-06 ENCOUNTER — OFFICE VISIT (OUTPATIENT)
Dept: FAMILY MEDICINE CLINIC | Age: 54
End: 2019-06-06
Payer: MEDICAID

## 2019-06-06 VITALS
WEIGHT: 278 LBS | SYSTOLIC BLOOD PRESSURE: 110 MMHG | BODY MASS INDEX: 46.26 KG/M2 | RESPIRATION RATE: 16 BRPM | OXYGEN SATURATION: 98 % | HEART RATE: 70 BPM | TEMPERATURE: 98.4 F | DIASTOLIC BLOOD PRESSURE: 80 MMHG

## 2019-06-06 DIAGNOSIS — G47.9 DISTURBANCE IN SLEEP BEHAVIOR: ICD-10-CM

## 2019-06-06 DIAGNOSIS — M54.9 CHRONIC MIDLINE BACK PAIN, UNSPECIFIED BACK LOCATION: ICD-10-CM

## 2019-06-06 DIAGNOSIS — R25.1 TREMOR: ICD-10-CM

## 2019-06-06 DIAGNOSIS — E78.5 HYPERLIPIDEMIA, UNSPECIFIED HYPERLIPIDEMIA TYPE: ICD-10-CM

## 2019-06-06 DIAGNOSIS — G89.29 CHRONIC MIDLINE BACK PAIN, UNSPECIFIED BACK LOCATION: ICD-10-CM

## 2019-06-06 DIAGNOSIS — E11.9 TYPE 2 DIABETES MELLITUS WITHOUT COMPLICATION, WITHOUT LONG-TERM CURRENT USE OF INSULIN (HCC): Primary | ICD-10-CM

## 2019-06-06 DIAGNOSIS — Z12.39 ENCOUNTER FOR SCREENING FOR MALIGNANT NEOPLASM OF BREAST: ICD-10-CM

## 2019-06-06 DIAGNOSIS — R11.0 NAUSEA: ICD-10-CM

## 2019-06-06 DIAGNOSIS — I10 ESSENTIAL HYPERTENSION: ICD-10-CM

## 2019-06-06 DIAGNOSIS — R21 SKIN RASH: ICD-10-CM

## 2019-06-06 DIAGNOSIS — B37.0 THRUSH: ICD-10-CM

## 2019-06-06 PROCEDURE — 99214 OFFICE O/P EST MOD 30 MIN: CPT | Performed by: FAMILY MEDICINE

## 2019-06-06 RX ORDER — NYSTATIN 100000 [USP'U]/G
POWDER TOPICAL
Qty: 30 G | Refills: 5 | Status: SHIPPED | OUTPATIENT
Start: 2019-06-06 | End: 2020-03-30

## 2019-06-06 RX ORDER — NYSTATIN AND TRIAMCINOLONE ACETONIDE 100000; 1 [USP'U]/G; MG/G
OINTMENT TOPICAL
Qty: 1 TUBE | Refills: 2 | Status: CANCELLED | OUTPATIENT
Start: 2019-06-06

## 2019-06-06 RX ORDER — ONDANSETRON 4 MG/1
4 TABLET, FILM COATED ORAL EVERY 8 HOURS PRN
Qty: 60 TABLET | Refills: 1 | Status: SHIPPED | OUTPATIENT
Start: 2019-06-06 | End: 2019-08-03 | Stop reason: SDUPTHER

## 2019-06-06 ASSESSMENT — PATIENT HEALTH QUESTIONNAIRE - PHQ9
1. LITTLE INTEREST OR PLEASURE IN DOING THINGS: 0
SUM OF ALL RESPONSES TO PHQ QUESTIONS 1-9: 0
2. FEELING DOWN, DEPRESSED OR HOPELESS: 0
SUM OF ALL RESPONSES TO PHQ9 QUESTIONS 1 & 2: 0
SUM OF ALL RESPONSES TO PHQ QUESTIONS 1-9: 0

## 2019-06-06 NOTE — PROGRESS NOTES
Ernesto Lind is a 52 y.o. female who presents today for   Chief Complaint   Patient presents with    Check-Up     needs lab results thyroid    Medication Refill    Insomnia       Chief Complaint: Establish care    HPI  Treatment Adherence:   Medication compliance:  compliant most of the time  Diet compliance:  compliant most of the time  Weight trend: stable  Current exercise: no regular exercise  Barriers: none    Diabetes Mellitus Type 2: Current symptoms/problems include none. She is not currently taking medications for her diabetes and is being controlled with diet and exercise only. Home blood sugar records: patient does not test  Any episodes of hypoglycemia? no  Eye exam current (within one year): unknown  Tobacco history: She  reports that she quit smoking about 10 years ago. Her smoking use included cigarettes. She has a 75.00 pack-year smoking history. She has never used smokeless tobacco.     Hypertension:  Home blood pressure monitoring: No.  She is adherent to a low sodium diet. Patient denies chest pain and palpitations. Antihypertensive medication side effects: no medication side effects noted. Use of agents associated with hypertension: none. Hyperlipidemia:  No new myalgias or GI upset on atorvastatin (Lipitor). Lab Results   Component Value Date    LABA1C 6.6 (H) 06/27/2019    LABA1C 6.4 (H) 09/12/2018    LABA1C 6.6 (H) 04/06/2018     Lab Results   Component Value Date    LABMICR 1.60 06/27/2019    CREATININE 0.6 06/27/2019     Lab Results   Component Value Date    ALT 34 (H) 06/27/2019    AST 31 06/27/2019     Lab Results   Component Value Date    CHOL 115 (L) 06/27/2019    TRIG 118 06/27/2019    HDL 33 (L) 06/27/2019    LDLCALC 58 06/27/2019        She does have history of disturbance in sleep behavior and is taking trazodone however it made her too drowsy the next morning.   She states that she seems to be doing well with her current medications and is sleeping better at this time.    She does have a history of essential tremors and is taking Requip which is been beneficial for her symptoms. She denies any issues with use the medicine. She also has a history of chronic back pain is following with pain management, Dr. Lalito Rasmussen. She reports that she is getting relief from the medications being prescribed for back pain. She denies any issues with use of medicine. She reports that allows her to do things that she needs to do. Patient does wear dentures and occasionally have flareups of thrush. She denies any issues at this time but has had issues previously and does also have some issues with fungal skin rashes as well has been able to control her rash with nystatin powder. Review of Systems   Constitutional: Negative for activity change, appetite change, fatigue and fever. HENT: Negative for congestion, rhinorrhea and sore throat. Eyes: Negative for pain and discharge. Respiratory: Negative for cough, shortness of breath and wheezing. Cardiovascular: Negative for chest pain and palpitations. Gastrointestinal: Negative for abdominal pain, constipation, diarrhea, nausea and vomiting. Endocrine: Negative for cold intolerance and heat intolerance. Genitourinary: Negative for dysuria and hematuria. Musculoskeletal: Positive for back pain. Negative for neck pain. Skin: Negative for rash and wound. Neurological: Negative for syncope and weakness. Hematological: Negative for adenopathy. Does not bruise/bleed easily. Psychiatric/Behavioral: Positive for sleep disturbance. Negative for dysphoric mood. The patient is not nervous/anxious.         Past Medical History:   Diagnosis Date    Anxiety     Chronic back pain     COPD (chronic obstructive pulmonary disease) (HCC)     Diabetes mellitus (HCC)     hx. of diabetes, no meds    Epilepsy ([facility] [address]     GERD (gastroesophageal reflux disease)     History of colon polyps     History of seizures     Hyperlipidemia  Hypertension     Memory difficulty     Neck pain     Obesity     Other malaise and fatigue     Oxygen dependent     at night    Parkinsons disease (HCC)     Restless legs syndrome     SOB (shortness of breath)     Swelling     SWELLING OF HANDS AND FEET    Vision blurred        Current Outpatient Medications   Medication Sig Dispense Refill    ondansetron (ZOFRAN) 4 MG tablet Take 1 tablet by mouth every 8 hours as needed for Nausea or Vomiting 60 tablet 1    nystatin (MYCOSTATIN) 976730 UNIT/GM powder Apply 3 times daily. 30 g 5    rOPINIRole (REQUIP) 0.5 MG tablet TAKE 1 TABLET BY MOUTH EVERY DAY 30 tablet 5    lisinopril (PRINIVIL;ZESTRIL) 20 MG tablet TAKE 1 TABLET BY MOUTH DAILY 30 tablet 11    vitamin B-12 (CYANOCOBALAMIN) 1000 MCG tablet Take 1,000 mcg by mouth daily.  albuterol (VENTOLIN HFA) 108 (90 BASE) MCG/ACT inhaler Inhale 2 puffs into the lungs every 6 hours as needed.  vitamin E 100 UNITS capsule Take 1,000 Units by mouth daily. 2 pills daily      Diazepam (VALIUM PO) Take 5 mg by mouth daily.  Hydrocodone-Acetaminophen (LORTAB PO)   Take 7.5-500 mg by mouth 3 times daily       atorvastatin (LIPITOR) 40 MG tablet TAKE 1 TABLET BY MOUTH EVERY DAY 30 tablet 3    vitamin D (ERGOCALCIFEROL) 09815 units CAPS capsule Take 50,000 Units by mouth twice a week       No current facility-administered medications for this visit.            Allergies   Allergen Reactions    Oxycodone-Acetaminophen     Oxycodone-Acetaminophen Nausea Only       Past Surgical History:   Procedure Laterality Date    BREAST SURGERY  2013    [facility] Cyst    CARPAL TUNNEL RELEASE Right 1/25/2019    RIGHT CARPAL TUNNEL RELEASE performed by Kaylen Mahmood MD at [address]   07/28/2010    Dr Michael Later:  HP x 2. 5 yr recall     COLONOSCOPY  9-2-08    Dr Huber Carcamo  7-27-07    Dr Michael Later    COLONOSCOPY  08/04/2015    [facility]: normal (5 yr)    COLONOSCOPY N/A 2/6/2018 Dr Corrales-[facility]--5 yr recall    EYE SURGERY      right cat with implant    HYSTERECTOMY  2003    [place] EGD TRANSORAL BIOPSY SINGLE/MULTIPLE N/A 5/1/2018    Dr Corrales-w/dilation over wire-48 [place]-mild esophagitis/distal esophageal stricture Laura (-)  Esophagogastritis    PRE-MALIGNANT / BENIGN SKIN LESION EXCISION      BG    UPPER GASTROINTESTINAL ENDOSCOPY  7-    Dr Mary Ramos ENDOSCOPY  8-26-08    Dr Kiara Redd  01/30/2013    Dr Sb Rick:  possible GERD. Biopsies for Walters's and EoE neg    UPPER GASTROINTESTINAL ENDOSCOPY  4/9/14    Dr Sb Rick       Social History     Tobacco Use    Smoking status: Former Smoker     Packs/day: 2.50     Years: 30.00     Pack years: 75.00     Types: Cigarettes     Last attempt to quit: 6/20/2009     Years since quitting: 10.0    Smokeless tobacco: Never Used   Substance Use Topics    Alcohol use: No    Drug use: No       Family History   Problem Relation Age of Onset    Stroke Mother     Diabetes Mother     Heart Disease Mother     Stomach Cancer Father     Colon Cancer Father     Colon Polyps Father     Stomach Cancer Sister     Heart Disease Brother     Heart Disease Sister     Diabetes Sister     Colon Polyps Sister         x2    Esophageal Cancer Neg Hx     Liver Cancer Neg Hx     Liver Disease Neg Hx     Rectal Cancer Neg Hx        /80 (Site: Right Upper Arm, Position: Sitting, Cuff Size: Large Adult)   Pulse 70   Temp 98.4 °F (36.9 °C) (Oral)   Resp 16   Wt 278 lb (126.1 kg)   SpO2 98%   BMI 46.26 kg/m²     Physical Exam   Constitutional: She is oriented to person, place, and time. She appears well-developed and well-nourished. No distress. HENT:   Head: Normocephalic and atraumatic. Right Ear: External ear normal.   Left Ear: External ear normal.   Nose: Nose normal.   Eyes: Conjunctivae and EOM are normal. Right eye exhibits no discharge.  Left eye exhibits no discharge. No scleral icterus. Neck: Neck supple. No tracheal deviation present. No thyromegaly present. Cardiovascular: Normal rate, regular rhythm, normal heart sounds and intact distal pulses. Exam reveals no gallop and no friction rub. No murmur heard. Pulmonary/Chest: Effort normal and breath sounds normal. No respiratory distress. She has no wheezes. She has no rales. Abdominal: Soft. Bowel sounds are normal. She exhibits no distension. There is no tenderness. Musculoskeletal: She exhibits no edema (Bilateral lower extremities) or deformity (No gross deformities of upper or lower extremities). Lymphadenopathy:     She has no cervical adenopathy. Neurological: She is alert and oriented to person, place, and time. No cranial nerve deficit. Skin: Skin is warm and dry. No rash noted. She is not diaphoretic. No erythema. Psychiatric: Her behavior is normal. Thought content normal.   Nursing note and vitals reviewed. Assessment:       ICD-10-CM    1. Type 2 diabetes mellitus without complication, without long-term current use of insulin (HCC) E11.9 CBC Auto Differential     Comprehensive Metabolic Panel     Hemoglobin A1C     Microalbumin / Creatinine Urine Ratio    Discussed importance of DM diet and benefits of exercise. Discussed proper use of medication. Stressed proper foot care and monitoring. Discussed the importance of yearly eye exams. 2. Essential hypertension I10 Will continue current medications and continue to monitor and adjust as indicated. 3. Hyperlipidemia, unspecified hyperlipidemia type E78.5 Lipid Panel    Tolerating Lipitor at this time. We will continue to monitor and adjust course dictates. 4. Disturbance in sleep behavior G47.9  he appears to be doing okay at this time. We will continue to monitor and adjust as course dictates. 5. Tremor R25.1  doing well with use of Requip. Will continue and continue to monitor.    6. Thrush B37.0 nystatin 949177 UNIT/ML suspension     Sig: Take 2 mLs by mouth 4 times daily for 10 days     Dispense:  80 mL     Refill:  0     Medications Discontinued During This Encounter   Medication Reason    meloxicam (MOBIC) 7.5 MG tablet LIST CLEANUP    nabumetone (RELAFEN) 500 MG tablet LIST CLEANUP    nystatin-triamcinolone (MYCOLOG) 941657-6.1 UNIT/GM-% ointment LIST CLEANUP    sucralfate (CARAFATE) 1 GM tablet LIST CLEANUP    traZODone (DESYREL) 100 MG tablet LIST CLEANUP    ondansetron (ZOFRAN) 4 MG tablet REORDER    nystatin (MYCOSTATIN) 058885 UNIT/GM powder REORDER    nystatin (MYCOSTATIN) 473566 UNIT/ML suspension REORDER     Patient Instructions   Patient given educational handouts and has had all questions answered. Patient voices understanding and agrees to plans along with risks and benefits of plan. Patient is instructed to continue prior meds,diet, and exercise plans as instructed. Patient agrees to follow up as instructed and sooner if needed. Patient agrees to go to ER if condition becomes emergent. Return if symptoms worsen or fail to improve, for next scheduled follow up with PCP.

## 2019-06-13 RX ORDER — ATORVASTATIN CALCIUM 40 MG/1
TABLET, FILM COATED ORAL
Qty: 30 TABLET | Refills: 3 | Status: SHIPPED | OUTPATIENT
Start: 2019-06-13 | End: 2019-12-01 | Stop reason: SDUPTHER

## 2019-06-27 DIAGNOSIS — E78.5 HYPERLIPIDEMIA, UNSPECIFIED HYPERLIPIDEMIA TYPE: ICD-10-CM

## 2019-06-27 DIAGNOSIS — E11.9 TYPE 2 DIABETES MELLITUS WITHOUT COMPLICATION, WITHOUT LONG-TERM CURRENT USE OF INSULIN (HCC): ICD-10-CM

## 2019-06-27 LAB
ALBUMIN SERPL-MCNC: 4.4 G/DL (ref 3.5–5.2)
ALP BLD-CCNC: 113 U/L (ref 35–104)
ALT SERPL-CCNC: 34 U/L (ref 5–33)
ANION GAP SERPL CALCULATED.3IONS-SCNC: 13 MMOL/L (ref 7–19)
AST SERPL-CCNC: 31 U/L (ref 5–32)
BASOPHILS ABSOLUTE: 0.1 K/UL (ref 0–0.2)
BASOPHILS RELATIVE PERCENT: 1.2 % (ref 0–1)
BILIRUB SERPL-MCNC: 0.3 MG/DL (ref 0.2–1.2)
BUN BLDV-MCNC: 15 MG/DL (ref 6–20)
CALCIUM SERPL-MCNC: 9.1 MG/DL (ref 8.6–10)
CHLORIDE BLD-SCNC: 102 MMOL/L (ref 98–111)
CHOLESTEROL, TOTAL: 115 MG/DL (ref 160–199)
CO2: 24 MMOL/L (ref 22–29)
CREAT SERPL-MCNC: 0.6 MG/DL (ref 0.5–0.9)
CREATININE URINE: 176.1 MG/DL (ref 4.2–622)
EOSINOPHILS ABSOLUTE: 0.2 K/UL (ref 0–0.6)
EOSINOPHILS RELATIVE PERCENT: 4.4 % (ref 0–5)
GFR NON-AFRICAN AMERICAN: >60
GLUCOSE BLD-MCNC: 136 MG/DL (ref 74–109)
HBA1C MFR BLD: 6.6 % (ref 4–6)
HCT VFR BLD CALC: 38.6 % (ref 37–47)
HDLC SERPL-MCNC: 33 MG/DL (ref 65–121)
HEMOGLOBIN: 12.3 G/DL (ref 12–16)
LDL CHOLESTEROL CALCULATED: 58 MG/DL
LYMPHOCYTES ABSOLUTE: 2.1 K/UL (ref 1.1–4.5)
LYMPHOCYTES RELATIVE PERCENT: 42.8 % (ref 20–40)
MCH RBC QN AUTO: 28.9 PG (ref 27–31)
MCHC RBC AUTO-ENTMCNC: 31.9 G/DL (ref 33–37)
MCV RBC AUTO: 90.8 FL (ref 81–99)
MICROALBUMIN UR-MCNC: 1.6 MG/DL (ref 0–19)
MICROALBUMIN/CREAT UR-RTO: 9.1 MG/G
MONOCYTES ABSOLUTE: 0.6 K/UL (ref 0–0.9)
MONOCYTES RELATIVE PERCENT: 12.7 % (ref 0–10)
NEUTROPHILS ABSOLUTE: 1.9 K/UL (ref 1.5–7.5)
NEUTROPHILS RELATIVE PERCENT: 38.7 % (ref 50–65)
PDW BLD-RTO: 12.6 % (ref 11.5–14.5)
PLATELET # BLD: 282 K/UL (ref 130–400)
PMV BLD AUTO: 10.8 FL (ref 9.4–12.3)
POTASSIUM SERPL-SCNC: 4.1 MMOL/L (ref 3.5–5)
RBC # BLD: 4.25 M/UL (ref 4.2–5.4)
SODIUM BLD-SCNC: 139 MMOL/L (ref 136–145)
TOTAL PROTEIN: 8 G/DL (ref 6.6–8.7)
TRIGL SERPL-MCNC: 118 MG/DL (ref 0–149)
WBC # BLD: 4.8 K/UL (ref 4.8–10.8)

## 2019-06-28 ASSESSMENT — ENCOUNTER SYMPTOMS
SORE THROAT: 0
RHINORRHEA: 0
COUGH: 0
DIARRHEA: 0
SHORTNESS OF BREATH: 0
EYE DISCHARGE: 0
WHEEZING: 0
BACK PAIN: 1
EYE PAIN: 0
CONSTIPATION: 0
NAUSEA: 0
ABDOMINAL PAIN: 0
VOMITING: 0

## 2019-06-28 NOTE — PATIENT INSTRUCTIONS
Patient Education        Learning About How to Have a Healthy Back  What causes back pain? Back pain is often caused by overuse, strain, or injury. For example, people often hurt their backs playing sports or working in the yard, being jolted in a car accident, or lifting something too heavy. Aging plays a part too. Your bones and muscles tend to lose strength as you age, which makes injury more likely. The spongy discs between the bones of the spine (vertebrae) may suffer from wear and tear and no longer provide enough cushion between the bones. A disc that bulges or breaks open (herniated disc) can press on nerves, causing back pain. In some people, back pain is the result of arthritis, broken vertebrae caused by bone loss (osteoporosis), illness, or a spine problem. Although most people have back pain at one time or another, there are steps you can take to make it less likely. How can you have a healthy back? Reduce stress on your back through good posture  Slumping or slouching alone may not cause low back pain. But after the back has been strained or injured, bad posture can make pain worse. · Sleep in a position that maintains your back's normal curves and on a mattress that feels comfortable. Sleep on your side with a pillow between your knees, or sleep on your back with a pillow under your knees. These positions can reduce strain on your back. · Stand and sit up straight. \"Good posture\" generally means your ears, shoulders, and hips are in a straight line. · If you must stand for a long time, put one foot on a stool, ledge, or box. Switch feet every now and then. · Sit in a chair that is low enough to let you place both feet flat on the floor with both knees nearly level with your hips. If your chair or desk is too high, use a footrest to raise your knees. Place a small pillow, a rolled-up towel, or a lumbar roll in the curve of your back if you need extra support.   · Try a kneeling chair, which helps tilt your hips forward. This takes pressure off your lower back. · Try sitting on an exercise ball. It can rock from side to side, which helps keep your back loose. · When driving, keep your knees nearly level with your hips. Sit straight, and drive with both hands on the steering wheel. Your arms should be in a slightly bent position. Reduce stress on your back through careful lifting  · Squat down, bending at the hips and knees only. If you need to, put one knee to the floor and extend your other knee in front of you, bent at a right angle (half kneeling). · Press your chest straight forward. This helps keep your upper back straight while keeping a slight arch in your low back. · Hold the load as close to your body as possible, at the level of your belly button (navel). · Use your feet to change direction, taking small steps. · Lead with your hips as you change direction. Keep your shoulders in line with your hips as you move. · Set down your load carefully, squatting with your knees and hips only. Exercise and stretch your back  · Do some exercise on most days of the week, if your doctor says it is okay. You can walk, run, swim, or cycle. · Stretch your back muscles. Here are a few exercises to try:  ? Lie on your back, and gently pull one bent knee to your chest. Put that foot back on the floor, and then pull the other knee to your chest.  ? Do pelvic tilts. Lie on your back with your knees bent. Tighten your stomach muscles. Pull your belly button (navel) in and up toward your ribs. You should feel like your back is pressing to the floor and your hips and pelvis are slightly lifting off the floor. Hold for 6 seconds while breathing smoothly. ? Sit with your back flat against a wall. · Keep your core muscles strong. The muscles of your back, belly (abdomen), and buttocks support your spine. ? Pull in your belly and imagine pulling your navel toward your spine.  Hold this for 6 seconds,

## 2019-07-09 ENCOUNTER — TELEPHONE (OUTPATIENT)
Dept: FAMILY MEDICINE CLINIC | Age: 54
End: 2019-07-09

## 2019-07-09 NOTE — TELEPHONE ENCOUNTER
----- Message from Mamie Willson sent at 7/9/2019  1:56 PM CDT -----   neg  ----- Message -----  From: Yolanda Pfeiffer  Sent: 7/9/2019   1:09 PM  To: Jeanna Martin MD

## 2019-07-29 RX ORDER — ALBUTEROL SULFATE 90 UG/1
AEROSOL, METERED RESPIRATORY (INHALATION)
Qty: 18 INHALER | Refills: 4 | OUTPATIENT
Start: 2019-07-29

## 2019-07-29 RX ORDER — ALBUTEROL SULFATE 90 UG/1
AEROSOL, METERED RESPIRATORY (INHALATION)
Qty: 18 INHALER | Refills: 4 | Status: SHIPPED | OUTPATIENT
Start: 2019-07-29 | End: 2020-08-12 | Stop reason: SDUPTHER

## 2019-08-03 DIAGNOSIS — R11.0 NAUSEA: ICD-10-CM

## 2019-08-05 RX ORDER — ONDANSETRON 4 MG/1
TABLET, FILM COATED ORAL
Qty: 60 TABLET | Refills: 1 | Status: SHIPPED | OUTPATIENT
Start: 2019-08-05 | End: 2019-10-09 | Stop reason: SDUPTHER

## 2019-09-11 ENCOUNTER — OFFICE VISIT (OUTPATIENT)
Dept: FAMILY MEDICINE CLINIC | Age: 54
End: 2019-09-11
Payer: MEDICAID

## 2019-09-11 VITALS
RESPIRATION RATE: 16 BRPM | TEMPERATURE: 97.1 F | HEIGHT: 65 IN | SYSTOLIC BLOOD PRESSURE: 124 MMHG | HEART RATE: 78 BPM | DIASTOLIC BLOOD PRESSURE: 82 MMHG | WEIGHT: 285 LBS | BODY MASS INDEX: 47.48 KG/M2 | OXYGEN SATURATION: 97 %

## 2019-09-11 DIAGNOSIS — F33.1 MODERATE EPISODE OF RECURRENT MAJOR DEPRESSIVE DISORDER (HCC): Primary | ICD-10-CM

## 2019-09-11 DIAGNOSIS — R53.83 FATIGUE, UNSPECIFIED TYPE: ICD-10-CM

## 2019-09-11 DIAGNOSIS — E11.9 TYPE 2 DIABETES MELLITUS WITHOUT COMPLICATION, WITHOUT LONG-TERM CURRENT USE OF INSULIN (HCC): ICD-10-CM

## 2019-09-11 DIAGNOSIS — E78.2 MIXED HYPERLIPIDEMIA: ICD-10-CM

## 2019-09-11 DIAGNOSIS — I10 ESSENTIAL HYPERTENSION: ICD-10-CM

## 2019-09-11 LAB
ALBUMIN SERPL-MCNC: 3.9 G/DL (ref 3.5–5.2)
ALP BLD-CCNC: 98 U/L (ref 35–104)
ALT SERPL-CCNC: 38 U/L (ref 5–33)
ANION GAP SERPL CALCULATED.3IONS-SCNC: 15 MMOL/L (ref 7–19)
AST SERPL-CCNC: 27 U/L (ref 5–32)
BASOPHILS ABSOLUTE: 0 K/UL (ref 0–0.2)
BASOPHILS RELATIVE PERCENT: 1.1 % (ref 0–1)
BILIRUB SERPL-MCNC: <0.2 MG/DL (ref 0.2–1.2)
BUN BLDV-MCNC: 9 MG/DL (ref 6–20)
CALCIUM SERPL-MCNC: 9.2 MG/DL (ref 8.6–10)
CHLORIDE BLD-SCNC: 107 MMOL/L (ref 98–111)
CO2: 24 MMOL/L (ref 22–29)
CREAT SERPL-MCNC: 0.6 MG/DL (ref 0.5–0.9)
EOSINOPHILS ABSOLUTE: 0.2 K/UL (ref 0–0.6)
EOSINOPHILS RELATIVE PERCENT: 5.9 % (ref 0–5)
GFR NON-AFRICAN AMERICAN: >60
GLUCOSE BLD-MCNC: 124 MG/DL (ref 74–109)
HBA1C MFR BLD: 6.9 % (ref 4–6)
HCT VFR BLD CALC: 36.9 % (ref 37–47)
HEMOGLOBIN: 11.5 G/DL (ref 12–16)
IMMATURE GRANULOCYTES #: 0 K/UL
LYMPHOCYTES ABSOLUTE: 1.9 K/UL (ref 1.1–4.5)
LYMPHOCYTES RELATIVE PERCENT: 50.5 % (ref 20–40)
MCH RBC QN AUTO: 29.2 PG (ref 27–31)
MCHC RBC AUTO-ENTMCNC: 31.2 G/DL (ref 33–37)
MCV RBC AUTO: 93.7 FL (ref 81–99)
MONOCYTES ABSOLUTE: 0.4 K/UL (ref 0–0.9)
MONOCYTES RELATIVE PERCENT: 10.5 % (ref 0–10)
NEUTROPHILS ABSOLUTE: 1.2 K/UL (ref 1.5–7.5)
NEUTROPHILS RELATIVE PERCENT: 31.5 % (ref 50–65)
PDW BLD-RTO: 12.4 % (ref 11.5–14.5)
PLATELET # BLD: 241 K/UL (ref 130–400)
PMV BLD AUTO: 11 FL (ref 9.4–12.3)
POTASSIUM SERPL-SCNC: 4.6 MMOL/L (ref 3.5–5)
RBC # BLD: 3.94 M/UL (ref 4.2–5.4)
SODIUM BLD-SCNC: 146 MMOL/L (ref 136–145)
TOTAL PROTEIN: 7.5 G/DL (ref 6.6–8.7)
TSH SERPL DL<=0.05 MIU/L-ACNC: 3.14 UIU/ML (ref 0.27–4.2)
VITAMIN B-12: 1524 PG/ML (ref 211–946)
WBC # BLD: 3.7 K/UL (ref 4.8–10.8)

## 2019-09-11 PROCEDURE — 99214 OFFICE O/P EST MOD 30 MIN: CPT | Performed by: FAMILY MEDICINE

## 2019-09-11 NOTE — PROGRESS NOTES
Leticia Duran is a 52 y.o. female who presents today for   Chief Complaint   Patient presents with    Headache    Nausea     pt says she feels like she is going thru the change again    Anxiety    Sweats    Hyperlipidemia     not been taking it.  Diabetes       Chief Complaint: Follow-up    HPI  Patient has a history of type 2 diabetes and reports that she is doing well with her current medication. She denies any issues with use of medicine and states that her blood sugars been doing well. She reports that yesterday her blood sugar was 124 and that is typically what is been running in the mornings and will usually stay in the low to mid 100s. She denies any issues with her diabetes and reports that she is compliant with a diabetic diet. She also has a history of arterial hypertension that is doing well with her current medications. She denies any issues with use of medicine. She denies any symptoms from abnormal blood pressures. She does attempt avoid excess salt intake. She has a history of hyperlipidemia and reports today that she is not taking her Lipitor. She denies any specific problems with the medicine. She does report that more recently she is been having issues with hot flashes, headaches, and chills. She has been fatigued but otherwise states that she is not really sick and states that it feels as if she is going through menopause again. She states that she has had a decreased appetite. Other than when she is previously gone through menopause she did also report that she had the symptoms with previous episodes of being stressed out and being depressed and feels that that is the likely culprit of her symptoms. She states that she has been stressed and has been a little bit more down recently. Review of Systems   Constitutional: Positive for appetite change, chills and fatigue. Negative for activity change and fever. HENT: Negative for congestion and rhinorrhea.     Eyes: UNIT/GM powder Apply 3 times daily. 30 g 5    rOPINIRole (REQUIP) 0.5 MG tablet TAKE 1 TABLET BY MOUTH EVERY DAY 30 tablet 5    lisinopril (PRINIVIL;ZESTRIL) 20 MG tablet TAKE 1 TABLET BY MOUTH DAILY 30 tablet 11    vitamin D (ERGOCALCIFEROL) 23208 units CAPS capsule Take 50,000 Units by mouth twice a week      vitamin B-12 (CYANOCOBALAMIN) 1000 MCG tablet Take 1,000 mcg by mouth daily.  vitamin E 100 UNITS capsule Take 1,000 Units by mouth daily. 2 pills daily      Diazepam (VALIUM PO) Take 5 mg by mouth daily.  Hydrocodone-Acetaminophen (LORTAB PO)   Take 7.5-500 mg by mouth 3 times daily       Insulin Pen Needle (PEN NEEDLES) 32G X 6 MM MISC Use with Victoza injection 100 each 3     No current facility-administered medications for this visit. Allergies   Allergen Reactions    Oxycodone-Acetaminophen     Oxycodone-Acetaminophen Nausea Only       Past Surgical History:   Procedure Laterality Date    BREAST SURGERY  2013     Cyst    CARPAL TUNNEL RELEASE Right 1/25/2019    RIGHT CARPAL TUNNEL RELEASE performed by Juancho Sosa MD at [address] [place] [address] COLONOSCOPY  07/28/2010    Dr Ameya Ochoa:  HP x 2. 5 yr recall     COLONOSCOPY  9-2-08    Dr Ameya Ochoa    COLONOSCOPY  7-27-07    Dr Ameya Ochoa    COLONOSCOPY  08/04/2015    [facility]: normal (5 yr)   [employer] COLONOSCOPY N/A 2/6/2018    Dr Corrales-[facility]--5 yr recall    EYE SURGERY      right cat with implant    HYSTERECTOMY  2003    [place] EGD TRANSORAL BIOPSY SINGLE/MULTIPLE N/A 5/1/2018    Dr Corrales-w/dilation over wire-48 [place]-mild esophagitis/distal esophageal stricture Laura (-)  Esophagogastritis    PRE-MALIGNANT / BENIGN SKIN LESION EXCISION          UPPER GASTROINTESTINAL ENDOSCOPY  7-    Dr Ashwini Looney ENDOSCOPY  8-26-08    Dr Cuong Celeste  01/30/2013    Dr Ameya Ochoa:  possible GERD.  Biopsies for Walters's and EoE neg    UPPER GASTROINTESTINAL ENDOSCOPY  4/9/14 Dr Brown Drivers       Social History     Tobacco Use    Smoking status: Former Smoker     Packs/day: 2.50     Years: 30.00     Pack years: 75.00     Types: Cigarettes     Last attempt to quit: 6/20/2009     Years since quitting: 10.2    Smokeless tobacco: Never Used   Substance Use Topics    Alcohol use: No    Drug use: No       Family History   Problem Relation Age of Onset   [facility] Stroke Mother     Diabetes Mother     Heart Disease Mother     Stomach Cancer Father     Colon Cancer Father     Colon Polyps Father     Stomach Cancer Sister     Heart Disease Brother     Heart Disease Sister     Diabetes Sister     Colon Polyps Sister         x2    Esophageal Cancer Neg Hx     Liver Cancer Neg Hx     Liver Disease Neg Hx     Rectal Cancer Neg Hx        /82 (Site: Left Upper Arm, Position: Sitting, Cuff Size: Large Adult)   Pulse 78   Temp 97.1 °F (36.2 °C) (Oral)   Resp 16   Ht 5' 5\" (1.651 m)   Wt 285 lb (129.3 kg)   SpO2 97%   BMI 47.43 kg/m²     Physical Exam   Constitutional: She is oriented to person, place, and time. She appears well-developed and well-nourished. No distress. HENT:   Head: Normocephalic and atraumatic. Right Ear: External ear normal.   Left Ear: External ear normal.   Nose: Nose normal.   Eyes: Conjunctivae and EOM are normal. Right eye exhibits no discharge. Left eye exhibits no discharge. No scleral icterus. Neck: Neck supple. No tracheal deviation present. No thyromegaly present. Cardiovascular: Normal rate, regular rhythm, normal heart sounds and intact distal pulses. Exam reveals no gallop and no friction rub. No murmur heard. Pulmonary/Chest: Effort normal and breath sounds normal. No respiratory distress. She has no wheezes. She has no rales. Abdominal: Soft. Bowel sounds are normal. She exhibits no distension. There is no tenderness.    Musculoskeletal: She exhibits no edema (Bilateral lower extremities) or deformity (No gross deformities of upper or

## 2019-09-12 DIAGNOSIS — R89.9 ABNORMAL LABORATORY TEST RESULT: Primary | ICD-10-CM

## 2019-09-18 ENCOUNTER — TELEPHONE (OUTPATIENT)
Dept: FAMILY MEDICINE CLINIC | Age: 54
End: 2019-09-18

## 2019-09-18 RX ORDER — BLOOD SUGAR DIAGNOSTIC
STRIP MISCELLANEOUS
Qty: 100 EACH | Refills: 3 | Status: SHIPPED | OUTPATIENT
Start: 2019-09-18 | End: 2020-03-30

## 2019-09-27 ASSESSMENT — ENCOUNTER SYMPTOMS
SHORTNESS OF BREATH: 0
ABDOMINAL PAIN: 0
VOMITING: 0
DIARRHEA: 0
RHINORRHEA: 0
CONSTIPATION: 0
EYE DISCHARGE: 0
COUGH: 0
BACK PAIN: 1
NAUSEA: 0
EYE PAIN: 0

## 2019-10-09 ENCOUNTER — OFFICE VISIT (OUTPATIENT)
Dept: FAMILY MEDICINE CLINIC | Age: 54
End: 2019-10-09
Payer: MEDICAID

## 2019-10-09 VITALS
SYSTOLIC BLOOD PRESSURE: 126 MMHG | BODY MASS INDEX: 46.66 KG/M2 | TEMPERATURE: 96.7 F | HEART RATE: 88 BPM | WEIGHT: 280.4 LBS | DIASTOLIC BLOOD PRESSURE: 74 MMHG | OXYGEN SATURATION: 96 %

## 2019-10-09 DIAGNOSIS — E11.9 TYPE 2 DIABETES MELLITUS WITHOUT COMPLICATION, WITHOUT LONG-TERM CURRENT USE OF INSULIN (HCC): ICD-10-CM

## 2019-10-09 DIAGNOSIS — I10 ESSENTIAL HYPERTENSION: ICD-10-CM

## 2019-10-09 DIAGNOSIS — F33.1 MODERATE EPISODE OF RECURRENT MAJOR DEPRESSIVE DISORDER (HCC): Primary | ICD-10-CM

## 2019-10-09 DIAGNOSIS — E78.2 MIXED HYPERLIPIDEMIA: ICD-10-CM

## 2019-10-09 DIAGNOSIS — R11.0 NAUSEA: ICD-10-CM

## 2019-10-09 DIAGNOSIS — G25.0 ESSENTIAL TREMOR: ICD-10-CM

## 2019-10-09 PROCEDURE — 99214 OFFICE O/P EST MOD 30 MIN: CPT | Performed by: FAMILY MEDICINE

## 2019-10-09 RX ORDER — ONDANSETRON 4 MG/1
TABLET, FILM COATED ORAL
Qty: 60 TABLET | Refills: 1 | Status: SHIPPED | OUTPATIENT
Start: 2019-10-09 | End: 2019-11-06 | Stop reason: SDUPTHER

## 2019-10-09 RX ORDER — ROPINIROLE 0.5 MG/1
TABLET, FILM COATED ORAL
Qty: 30 TABLET | Refills: 5 | Status: SHIPPED | OUTPATIENT
Start: 2019-10-09 | End: 2020-03-30

## 2019-10-20 ASSESSMENT — ENCOUNTER SYMPTOMS
EYE PAIN: 0
COUGH: 0
CONSTIPATION: 0
VOMITING: 0
BACK PAIN: 1
EYE DISCHARGE: 0
NAUSEA: 1
ABDOMINAL PAIN: 0
DIARRHEA: 0
SHORTNESS OF BREATH: 0
RHINORRHEA: 0

## 2019-11-06 DIAGNOSIS — R11.0 NAUSEA: ICD-10-CM

## 2019-11-06 RX ORDER — ONDANSETRON 4 MG/1
TABLET, FILM COATED ORAL
Qty: 60 TABLET | Refills: 1 | Status: SHIPPED | OUTPATIENT
Start: 2019-11-06 | End: 2020-01-13

## 2019-12-02 RX ORDER — ATORVASTATIN CALCIUM 40 MG/1
TABLET, FILM COATED ORAL
Qty: 30 TABLET | Refills: 3 | Status: SHIPPED | OUTPATIENT
Start: 2019-12-02 | End: 2020-03-30

## 2020-01-13 RX ORDER — ONDANSETRON 4 MG/1
TABLET, FILM COATED ORAL
Qty: 60 TABLET | Refills: 1 | Status: SHIPPED | OUTPATIENT
Start: 2020-01-13 | End: 2020-03-12 | Stop reason: SDUPTHER

## 2020-03-12 ENCOUNTER — TELEPHONE (OUTPATIENT)
Dept: FAMILY MEDICINE CLINIC | Age: 55
End: 2020-03-12

## 2020-03-12 RX ORDER — LISINOPRIL 20 MG/1
20 TABLET ORAL DAILY
Qty: 30 TABLET | Refills: 11 | Status: SHIPPED | OUTPATIENT
Start: 2020-03-12 | End: 2020-12-29

## 2020-03-12 RX ORDER — ONDANSETRON 4 MG/1
TABLET, FILM COATED ORAL
Qty: 60 TABLET | Refills: 1 | Status: SHIPPED | OUTPATIENT
Start: 2020-03-12 | End: 2020-05-04

## 2020-03-30 RX ORDER — ATORVASTATIN CALCIUM 40 MG/1
TABLET, FILM COATED ORAL
Qty: 30 TABLET | Refills: 3 | Status: SHIPPED | OUTPATIENT
Start: 2020-03-30 | End: 2020-08-04

## 2020-03-30 RX ORDER — PEN NEEDLE, DIABETIC 32GX 5/32"
NEEDLE, DISPOSABLE MISCELLANEOUS
Qty: 100 EACH | Refills: 3 | Status: SHIPPED | OUTPATIENT
Start: 2020-03-30 | End: 2021-10-05

## 2020-03-30 RX ORDER — ROPINIROLE 0.5 MG/1
TABLET, FILM COATED ORAL
Qty: 30 TABLET | Refills: 5 | Status: SHIPPED | OUTPATIENT
Start: 2020-03-30 | End: 2020-09-09 | Stop reason: SDUPTHER

## 2020-03-30 RX ORDER — NYSTATIN 100000 [USP'U]/G
POWDER TOPICAL
Qty: 30 G | Refills: 5 | Status: SHIPPED | OUTPATIENT
Start: 2020-03-30 | End: 2022-04-28 | Stop reason: ALTCHOICE

## 2020-05-04 RX ORDER — ONDANSETRON 4 MG/1
TABLET, FILM COATED ORAL
Qty: 60 TABLET | Refills: 1 | Status: SHIPPED | OUTPATIENT
Start: 2020-05-04 | End: 2020-07-30

## 2020-06-02 ENCOUNTER — TRANSCRIBE ORDERS (OUTPATIENT)
Dept: ADMINISTRATIVE | Facility: HOSPITAL | Age: 55
End: 2020-06-02

## 2020-06-02 DIAGNOSIS — Z01.818 PREOP TESTING: Primary | ICD-10-CM

## 2020-06-22 ENCOUNTER — LAB (OUTPATIENT)
Dept: LAB | Facility: HOSPITAL | Age: 55
End: 2020-06-22

## 2020-06-22 PROCEDURE — U0003 INFECTIOUS AGENT DETECTION BY NUCLEIC ACID (DNA OR RNA); SEVERE ACUTE RESPIRATORY SYNDROME CORONAVIRUS 2 (SARS-COV-2) (CORONAVIRUS DISEASE [COVID-19]), AMPLIFIED PROBE TECHNIQUE, MAKING USE OF HIGH THROUGHPUT TECHNOLOGIES AS DESCRIBED BY CMS-2020-01-R: HCPCS | Performed by: PAIN MEDICINE

## 2020-06-23 LAB
COVID LABCORP PRIORITY: NORMAL
SARS-COV-2 RNA RESP QL NAA+PROBE: NOT DETECTED

## 2020-07-30 RX ORDER — ONDANSETRON 4 MG/1
TABLET, FILM COATED ORAL
Qty: 60 TABLET | Refills: 1 | Status: SHIPPED | OUTPATIENT
Start: 2020-07-30 | End: 2020-09-09 | Stop reason: SDUPTHER

## 2020-08-04 RX ORDER — ATORVASTATIN CALCIUM 40 MG/1
TABLET, FILM COATED ORAL
Qty: 30 TABLET | Refills: 3 | Status: SHIPPED | OUTPATIENT
Start: 2020-08-04 | End: 2020-12-07 | Stop reason: SDUPTHER

## 2020-08-13 RX ORDER — ALBUTEROL SULFATE 90 UG/1
AEROSOL, METERED RESPIRATORY (INHALATION)
Qty: 18 INHALER | Refills: 1 | Status: SHIPPED | OUTPATIENT
Start: 2020-08-13 | End: 2020-10-14

## 2020-09-09 ENCOUNTER — OFFICE VISIT (OUTPATIENT)
Dept: FAMILY MEDICINE CLINIC | Age: 55
End: 2020-09-09
Payer: MEDICAID

## 2020-09-09 VITALS
TEMPERATURE: 96.9 F | SYSTOLIC BLOOD PRESSURE: 126 MMHG | OXYGEN SATURATION: 97 % | HEIGHT: 65 IN | HEART RATE: 101 BPM | BODY MASS INDEX: 48.82 KG/M2 | DIASTOLIC BLOOD PRESSURE: 78 MMHG | WEIGHT: 293 LBS

## 2020-09-09 LAB — HBA1C MFR BLD: 7.7 %

## 2020-09-09 PROCEDURE — 83036 HEMOGLOBIN GLYCOSYLATED A1C: CPT | Performed by: FAMILY MEDICINE

## 2020-09-09 PROCEDURE — 99214 OFFICE O/P EST MOD 30 MIN: CPT | Performed by: FAMILY MEDICINE

## 2020-09-09 PROCEDURE — 3051F HG A1C>EQUAL 7.0%<8.0%: CPT | Performed by: FAMILY MEDICINE

## 2020-09-09 RX ORDER — ONDANSETRON 4 MG/1
TABLET, FILM COATED ORAL
Qty: 60 TABLET | Refills: 1 | Status: SHIPPED | OUTPATIENT
Start: 2020-09-09 | End: 2020-10-27

## 2020-09-09 RX ORDER — ROPINIROLE 0.5 MG/1
TABLET, FILM COATED ORAL
Qty: 30 TABLET | Refills: 5 | Status: SHIPPED | OUTPATIENT
Start: 2020-09-09 | End: 2021-04-05

## 2020-09-09 RX ORDER — ERTUGLIFLOZIN 5 MG/1
1 TABLET, FILM COATED ORAL DAILY
Qty: 30 TABLET | Refills: 3 | Status: SHIPPED | OUTPATIENT
Start: 2020-09-09 | End: 2021-01-05

## 2020-09-09 RX ORDER — BUPROPION HYDROCHLORIDE 150 MG/1
150 TABLET ORAL EVERY MORNING
Qty: 30 TABLET | Refills: 3 | Status: SHIPPED | OUTPATIENT
Start: 2020-09-09 | End: 2020-12-09

## 2020-09-09 NOTE — PROGRESS NOTES
Aga Marion is a 62 y.o. female who presents today for   Chief Complaint   Patient presents with    6 Month Follow-Up    Discuss Medications     Zoloft and Victoza not working well for her    Fatigue     wants thyroid checked     Fussy       Chief Complaint: Depression    HPI  Patient reports that she is still been tired and irritable and feeling down and depressed. She states that the Zoloft does not seem to be helping but denies any issues from the medicine. She does feel like she is reached a point where she is interested in trying something else in efforts to improve her symptoms. She does have a history of type 2 diabetes and reports that she has gained weight on Victoza and for that reason is wishing to come off the shot because she feels like it is not helping. She denies any significant changes or problems from her diabetes at this time. She has arterial hypertension is doing well with her current medication. She denies any issues with use of medicine. She denies any symptoms of abnormal blood pressure. She does attempt avoid excess salt intake. She also has hyperlipidemia tolerating Lipitor without any myalgias or GI upsets. She is attempting watch her diet and try to stay physically active. She has issues with essential tremors and has been doing well with her current use of Requip. She denies any problems with the use of medicine or recent changes to her symptoms. Review of Systems   Constitutional: Positive for fatigue. Negative for activity change, appetite change and fever. HENT: Negative for congestion and rhinorrhea. Eyes: Negative for pain and discharge. Respiratory: Negative for cough and shortness of breath. Cardiovascular: Negative for chest pain and palpitations. Gastrointestinal: Negative for abdominal pain, constipation, diarrhea, nausea and vomiting. Endocrine: Negative for cold intolerance and heat intolerance.    Genitourinary: Negative for dysuria and hematuria. Musculoskeletal: Positive for back pain. Negative for neck pain. Skin: Negative for rash and wound. Neurological: Positive for tremors. Negative for syncope and weakness. Hematological: Negative for adenopathy. Does not bruise/bleed easily. Psychiatric/Behavioral: Positive for dysphoric mood. Negative for sleep disturbance. The patient is not nervous/anxious. Past Medical History:   Diagnosis Date    Anxiety     Chronic back pain     COPD (chronic obstructive pulmonary disease) (HCC)     Diabetes mellitus (HCC)     hx. of diabetes, no meds    Epilepsy ([facility] [address]     GERD (gastroesophageal reflux disease)     History of colon polyps     History of seizures     Hyperlipidemia     Hypertension     Memory difficulty     Neck pain     Obesity     Other malaise and fatigue     Oxygen dependent     at night    Parkinsons disease (HCC)     Restless legs syndrome     SOB (shortness of breath)     Swelling     SWELLING OF HANDS AND FEET    Vision blurred        Current Outpatient Medications   Medication Sig Dispense Refill    ondansetron (ZOFRAN) 4 MG tablet Take 1 tablet by mouth every 8 hours as needed for nausea and vomiting 60 tablet 1    rOPINIRole (REQUIP) 0.5 MG tablet Take 1 tablet by mouth every day 30 tablet 5    metFORMIN (GLUCOPHAGE) 1000 MG tablet Take 1 tablet by mouth 2 times daily (with meals) 60 tablet 3    Ertugliflozin L-PyroglutamicAc (STEGLATRO) 5 MG TABS Take 1 tablet by mouth daily 30 tablet 3    buPROPion (WELLBUTRIN XL) 150 MG extended release tablet Take 1 tablet by mouth every morning 30 tablet 3    albuterol sulfate  (90 Base) MCG/ACT inhaler INHALE 2 PUFFS EVERY 4HRS AS NEEDED 18 Inhaler 1    atorvastatin (LIPITOR) 40 MG tablet TAKE 1 TABLET BY MOUTH EVERY DAY 30 tablet 3    nystatin (MYCOSTATIN) 891067 UNIT/GM powder APPLY 3 TIMES DAILY.  30 g 5    Insulin Pen Needle (BD PEN NEEDLE FRANCES U/F) 32G X 4 MM MISC USE AS DIRECTED WITH VICTOZA INJECTION 100 each 3    lisinopril (PRINIVIL;ZESTRIL) 20 MG tablet Take 1 tablet by mouth daily 30 tablet 11    vitamin D (ERGOCALCIFEROL) 45153 units CAPS capsule Take 50,000 Units by mouth twice a week      vitamin B-12 (CYANOCOBALAMIN) 1000 MCG tablet Take 1,000 mcg by mouth daily.  vitamin E 100 UNITS capsule Take 1,000 Units by mouth daily. 2 pills daily      Diazepam (VALIUM PO) Take 5 mg by mouth daily.  Hydrocodone-Acetaminophen (LORTAB PO)   Take 7.5-500 mg by mouth 3 times daily        No current facility-administered medications for this visit. Allergies   Allergen Reactions    Oxycodone-Acetaminophen     Oxycodone-Acetaminophen Nausea Only       Past Surgical History:   Procedure Laterality Date    BREAST SURGERY  2013    BG Cyst    CARPAL TUNNEL RELEASE Right 1/25/2019    RIGHT CARPAL TUNNEL RELEASE performed by Joan Pardo MD at [address] [place] [address] COLONOSCOPY  07/28/2010    Dr Geovanna Villanueva:  HP x 2. 5 yr recall     COLONOSCOPY  9-2-08    Dr Geovanna Villanueva    COLONOSCOPY  7-27-07    Dr Geovanna Villanueva    COLONOSCOPY  08/04/2015    [place]: normal (5 yr)   [facility] COLONOSCOPY N/A 2/6/2018    Dr Corrales-[facility]--5 yr recall    EYE SURGERY      right cat with implant    HYSTERECTOMY  2003    [place] EGD TRANSORAL BIOPSY SINGLE/MULTIPLE N/A 5/1/2018    Dr Corrales-w/dilation over wire-48 Martiniqu[place]-mild esophagitis/distal esophageal stricture Laura (-)  Esophagogastritis    PRE-MALIGNANT / BENIGN SKIN LESION EXCISION          UPPER GASTROINTESTINAL ENDOSCOPY  7-    Dr Linton Bolus ENDOSCOPY  8-26-08    Dr Catherine Lugo  01/30/2013    Dr Geovanna Villanueva:  possible GERD.  Biopsies for Walters's and EoE neg    UPPER GASTROINTESTINAL ENDOSCOPY  4/9/14    Dr Geovanna Villanueva       Social History     Tobacco Use    Smoking status: Former Smoker     Packs/day: 2.50     Years: 30.00     Pack years: 75.00     Types: Cigarettes     Last attempt attention. All questions were answered and patient voiced understanding and agreement with plan as discussed.     Orders Placed This Encounter   Procedures    CBC Auto Differential     Standing Status:   Future     Standing Expiration Date:   9/9/2021    Comprehensive Metabolic Panel     Standing Status:   Future     Standing Expiration Date:   9/9/2021    Hemoglobin A1C     Standing Status:   Future     Standing Expiration Date:   9/9/2021    Lipid Panel     Standing Status:   Future     Standing Expiration Date:   9/9/2021     Order Specific Question:   Is Patient Fasting?/# of Hours     Answer:   fasting    Microalbumin / Creatinine Urine Ratio     Standing Status:   Future     Standing Expiration Date:   9/9/2021    TSH without Reflex     Standing Status:   Future     Standing Expiration Date:   9/9/2021    POCT glycosylated hemoglobin (Hb A1C)     Orders Placed This Encounter   Medications    ondansetron (ZOFRAN) 4 MG tablet     Sig: Take 1 tablet by mouth every 8 hours as needed for nausea and vomiting     Dispense:  60 tablet     Refill:  1    rOPINIRole (REQUIP) 0.5 MG tablet     Sig: Take 1 tablet by mouth every day     Dispense:  30 tablet     Refill:  5    metFORMIN (GLUCOPHAGE) 1000 MG tablet     Sig: Take 1 tablet by mouth 2 times daily (with meals)     Dispense:  60 tablet     Refill:  3    Ertugliflozin L-PyroglutamicAc (STEGLATRO) 5 MG TABS     Sig: Take 1 tablet by mouth daily     Dispense:  30 tablet     Refill:  3    buPROPion (WELLBUTRIN XL) 150 MG extended release tablet     Sig: Take 1 tablet by mouth every morning     Dispense:  30 tablet     Refill:  3     Medications Discontinued During This Encounter   Medication Reason    Liraglutide (VICTOZA) 18 MG/3ML SOPN SC injection     sertraline (ZOLOFT) 50 MG tablet     ondansetron (ZOFRAN) 4 MG tablet REORDER    rOPINIRole (REQUIP) 0.5 MG tablet REORDER     Patient Instructions   Patient given educational handouts and has had all questions answered. Patient voices understanding and agrees to plans along with risks and benefits of plan. Patient is instructed to continue prior meds,diet, and exercise plans as instructed. Patient agrees to follow up as instructed and sooner if needed. Patient agrees to go to ER if condition becomes emergent. Return in about 2 months (around 11/9/2020), or if symptoms worsen or fail to improve.

## 2020-09-21 ASSESSMENT — ENCOUNTER SYMPTOMS
NAUSEA: 0
BACK PAIN: 1
DIARRHEA: 0
COUGH: 0
RHINORRHEA: 0
ABDOMINAL PAIN: 0
SHORTNESS OF BREATH: 0
CONSTIPATION: 0
VOMITING: 0
EYE DISCHARGE: 0
EYE PAIN: 0

## 2020-09-21 NOTE — PATIENT INSTRUCTIONS
concert. Take part in a Shinto activity or other social gathering. Go to a [employer] game. · Ask a friend to have dinner with you. Take care of yourself  · Eat a balanced diet with plenty of fresh fruits and vegetables, whole grains, and lean protein. If you have lost your appetite, eat small snacks rather than large meals. · Avoid drinking alcohol or using illegal drugs. Do not take medicines that have not been prescribed for you. They may interfere with medicines you may be taking for depression, or they may make your depression worse. · Take your medicines exactly as they are prescribed. You may start to feel better within 1 to 3 weeks of taking antidepressant medicine. But it can take as many as 6 to 8 weeks to see more improvement. If you have questions or concerns about your medicines, or if you do not notice any improvement by 3 weeks, talk to your doctor. · If you have any side effects from your medicine, tell your doctor. Antidepressants can make you feel tired, dizzy, or nervous. Some people have dry mouth, constipation, headaches, sexual problems, or diarrhea. Many of these side effects are mild and will go away on their own after you have been taking the medicine for a few weeks. Some may last longer. Talk to your doctor if side effects are bothering you too much. You might be able to try a different medicine. · Get enough sleep. If you have problems sleeping:  ? Go to bed at the same time every night, and get up at the same time every morning. ? Keep your bedroom dark and quiet. ? Do not exercise after 5:00 p.m.  ? Avoid drinks with caffeine after 5:00 p.m. · Avoid sleeping pills unless they are prescribed by the doctor treating your depression. Sleeping pills may make you groggy during the day, and they may interact with other medicine you are taking. · If you have any other illnesses, such as diabetes, heart disease, or high blood pressure, make sure to continue with your treatment.  Tell your doctor about all of the medicines you take, including those with or without a prescription. · Keep the numbers for these national suicide hotlines: 5-391-756-TALK (4-248.794.6234) and 6-605-BNDNIDK (4-644.864.9182). If you or someone you know talks about suicide or feeling hopeless, get help right away. When should you call for help? RFXA417 anytime you think you may need emergency care. For example, call if:  · You feel like hurting yourself or someone else. · Someone you know has depression and is about to attempt or is attempting suicide. Call your doctor now or seek immediate medical care if:  · You hear voices. · Someone you know has depression and:  ? Starts to give away his or her possessions. ? Uses illegal drugs or drinks alcohol heavily. ? Talks or writes about death, including writing suicide notes or talking about guns, knives, or pills. ? Starts to spend a lot of time alone. ? Acts very aggressively or suddenly appears calm. Watch closely for changes in your health, and be sure to contact your doctor if:  · You do not get better as expected. Where can you learn more? Go to https://[employer].com.[employer]. org and sign in to your [employer] account. Enter G811 in the [place] box to learn more about \"Recovering From Depression: Care Instructions. \"     If you do not have an account, please click on the \"Sign Up Now\" link. Current as of: January 31, 2020               Content Version: 12.5  © 2006-2020 [employer]wise, Incorporated. Care instructions adapted under license by [facility] ([facility]). If you have questions about a medical condition or this instruction, always ask your healthcare professional. [place] [address] any warranty or liability for your use of this information.

## 2020-10-14 RX ORDER — ALBUTEROL SULFATE 90 UG/1
AEROSOL, METERED RESPIRATORY (INHALATION)
Qty: 18 INHALER | Refills: 1 | Status: SHIPPED | OUTPATIENT
Start: 2020-10-14 | End: 2021-04-16 | Stop reason: SDUPTHER

## 2020-10-15 ENCOUNTER — TRANSCRIBE ORDERS (OUTPATIENT)
Dept: ADMINISTRATIVE | Facility: HOSPITAL | Age: 55
End: 2020-10-15

## 2020-10-15 DIAGNOSIS — Z11.59 SCREENING FOR VIRAL DISEASE: Primary | ICD-10-CM

## 2020-10-17 ENCOUNTER — LAB (OUTPATIENT)
Dept: LAB | Facility: HOSPITAL | Age: 55
End: 2020-10-17

## 2020-10-17 PROCEDURE — C9803 HOPD COVID-19 SPEC COLLECT: HCPCS | Performed by: PAIN MEDICINE

## 2020-10-17 PROCEDURE — U0003 INFECTIOUS AGENT DETECTION BY NUCLEIC ACID (DNA OR RNA); SEVERE ACUTE RESPIRATORY SYNDROME CORONAVIRUS 2 (SARS-COV-2) (CORONAVIRUS DISEASE [COVID-19]), AMPLIFIED PROBE TECHNIQUE, MAKING USE OF HIGH THROUGHPUT TECHNOLOGIES AS DESCRIBED BY CMS-2020-01-R: HCPCS | Performed by: PAIN MEDICINE

## 2020-10-18 LAB
COVID LABCORP PRIORITY: NORMAL
SARS-COV-2 RNA RESP QL NAA+PROBE: NOT DETECTED

## 2020-10-27 RX ORDER — ONDANSETRON 4 MG/1
TABLET, FILM COATED ORAL
Qty: 60 TABLET | Refills: 1 | Status: SHIPPED | OUTPATIENT
Start: 2020-10-27 | End: 2021-02-02 | Stop reason: SDUPTHER

## 2020-11-02 LAB
ALBUMIN SERPL-MCNC: 4.5 G/DL (ref 3.5–5.2)
ALP BLD-CCNC: 118 U/L (ref 35–104)
ALT SERPL-CCNC: 30 U/L (ref 5–33)
ANION GAP SERPL CALCULATED.3IONS-SCNC: 13 MMOL/L (ref 7–19)
AST SERPL-CCNC: 19 U/L (ref 5–32)
BASOPHILS ABSOLUTE: 0 K/UL (ref 0–0.2)
BASOPHILS RELATIVE PERCENT: 0.6 % (ref 0–1)
BILIRUB SERPL-MCNC: <0.2 MG/DL (ref 0.2–1.2)
BILIRUBIN DIRECT: 0.1 MG/DL (ref 0–0.3)
BILIRUBIN, INDIRECT: 0.1 MG/DL (ref 0.1–1)
BUN BLDV-MCNC: 14 MG/DL (ref 6–20)
CALCIUM SERPL-MCNC: 9.1 MG/DL (ref 8.6–10)
CHLORIDE BLD-SCNC: 101 MMOL/L (ref 98–111)
CHOLESTEROL, TOTAL: 93 MG/DL (ref 160–199)
CO2: 23 MMOL/L (ref 22–29)
CREAT SERPL-MCNC: 0.7 MG/DL (ref 0.5–0.9)
EOSINOPHILS ABSOLUTE: 0.2 K/UL (ref 0–0.6)
EOSINOPHILS RELATIVE PERCENT: 3.1 % (ref 0–5)
GFR AFRICAN AMERICAN: >59
GFR NON-AFRICAN AMERICAN: >60
GLUCOSE BLD-MCNC: 127 MG/DL (ref 74–109)
HBA1C MFR BLD: 6.8 % (ref 4–6)
HCT VFR BLD CALC: 42.8 % (ref 37–47)
HDLC SERPL-MCNC: 34 MG/DL (ref 65–121)
HEMOGLOBIN: 13.6 G/DL (ref 12–16)
IMMATURE GRANULOCYTES #: 0 K/UL
LDL CHOLESTEROL CALCULATED: 31 MG/DL
LYMPHOCYTES ABSOLUTE: 2.5 K/UL (ref 1.1–4.5)
LYMPHOCYTES RELATIVE PERCENT: 38.4 % (ref 20–40)
MCH RBC QN AUTO: 28.8 PG (ref 27–31)
MCHC RBC AUTO-ENTMCNC: 31.8 G/DL (ref 33–37)
MCV RBC AUTO: 90.5 FL (ref 81–99)
MONOCYTES ABSOLUTE: 0.5 K/UL (ref 0–0.9)
MONOCYTES RELATIVE PERCENT: 7.1 % (ref 0–10)
NEUTROPHILS ABSOLUTE: 3.3 K/UL (ref 1.5–7.5)
NEUTROPHILS RELATIVE PERCENT: 50.6 % (ref 50–65)
PDW BLD-RTO: 12.7 % (ref 11.5–14.5)
PLATELET # BLD: 305 K/UL (ref 130–400)
PMV BLD AUTO: 10.9 FL (ref 9.4–12.3)
POTASSIUM SERPL-SCNC: 3.9 MMOL/L (ref 3.5–5)
RBC # BLD: 4.73 M/UL (ref 4.2–5.4)
SODIUM BLD-SCNC: 137 MMOL/L (ref 136–145)
T3 FREE: 3.2 PG/ML (ref 2–4.4)
T4 TOTAL: 8.8 UG/DL (ref 4.5–11.7)
TOTAL PROTEIN: 7.9 G/DL (ref 6.6–8.7)
TRIGL SERPL-MCNC: 139 MG/DL (ref 0–149)
TSH SERPL DL<=0.05 MIU/L-ACNC: 3.4 UIU/ML (ref 0.27–4.2)
VITAMIN B-12: 273 PG/ML (ref 211–946)
VITAMIN D 25-HYDROXY: 22.5 NG/ML
WBC # BLD: 6.5 K/UL (ref 4.8–10.8)

## 2020-11-03 LAB — THYROID PEROXIDASE (TPO) ABS: 0.6 IU/ML (ref 0–9)

## 2020-12-07 RX ORDER — ATORVASTATIN CALCIUM 40 MG/1
TABLET, FILM COATED ORAL
Qty: 30 TABLET | Refills: 3 | Status: SHIPPED | OUTPATIENT
Start: 2020-12-07 | End: 2021-03-08

## 2020-12-09 ENCOUNTER — OFFICE VISIT (OUTPATIENT)
Dept: FAMILY MEDICINE CLINIC | Age: 55
End: 2020-12-09
Payer: MEDICAID

## 2020-12-09 VITALS
SYSTOLIC BLOOD PRESSURE: 138 MMHG | HEIGHT: 66 IN | BODY MASS INDEX: 45.48 KG/M2 | OXYGEN SATURATION: 98 % | RESPIRATION RATE: 18 BRPM | TEMPERATURE: 97.3 F | DIASTOLIC BLOOD PRESSURE: 80 MMHG | WEIGHT: 283 LBS | HEART RATE: 90 BPM

## 2020-12-09 PROCEDURE — 99214 OFFICE O/P EST MOD 30 MIN: CPT | Performed by: FAMILY MEDICINE

## 2020-12-09 RX ORDER — ESCITALOPRAM OXALATE 10 MG/1
10 TABLET ORAL DAILY
Qty: 30 TABLET | Refills: 3 | Status: SHIPPED | OUTPATIENT
Start: 2020-12-09 | End: 2021-02-09 | Stop reason: SDUPTHER

## 2020-12-09 RX ORDER — HYDROCODONE BITARTRATE AND ACETAMINOPHEN 10; 325 MG/1; MG/1
TABLET ORAL
COMMUNITY
Start: 2020-11-21

## 2020-12-09 NOTE — PROGRESS NOTES
Mert Day is a 60 y.o. female who presents today for   Chief Complaint   Patient presents with    Check-Up       Chief Complaint: Follow-up    HPI  Patient has a history of type 2 diabetes and reports that she is doing well with her current use of Metformin and Steglatro. She states that her blood sugar this morning was 123 and that is fairly consistent with what she is seeing. She denies any symptoms or problems from medication. She denies any symptoms or new changes from her diabetes. She does attempt to watch her diet. She has arterial hypertension reports that she is doing well with her current medications. She denies any issues with use of medicine denies any symptoms from abnormal blood pressure. She does attempt avoid excess salt intake. She also has hyperlipidemia tolerating Lipitor without any myalgias or GI upsets. She is to be watch her diet and try and stay physically active but has been limited by her chronic pain issues. She does have chronic pain issues that has her follow with pain management and she reports that they recently increased her pain medication and she does seem to be doing a lot better with the adjustment of her medicine. She denies any issues with use of medicine does report that is beneficial for her symptoms. She does have issues with depression and reports that the Wellbutrin has not been providing her the benefit to her mood. She is interested in potentially trying something else in efforts to improve her mood issues. Review of Systems   Constitutional: Negative for activity change, appetite change, fatigue and fever. HENT: Negative for congestion and rhinorrhea. Eyes: Negative for pain and discharge. Respiratory: Negative for cough and shortness of breath. Cardiovascular: Negative for chest pain and palpitations. Gastrointestinal: Negative for abdominal pain, constipation, diarrhea, nausea and vomiting.    Endocrine: Negative for cold intolerance and heat intolerance. Genitourinary: Negative for dysuria and hematuria. Musculoskeletal: Positive for back pain. Negative for neck pain. Skin: Negative for rash and wound. Neurological: Positive for tremors. Negative for syncope and weakness. Hematological: Negative for adenopathy. Does not bruise/bleed easily. Psychiatric/Behavioral: Positive for dysphoric mood. Negative for sleep disturbance. The patient is not nervous/anxious. Past Medical History:   Diagnosis Date    Anxiety     Chronic back pain     [facility]     Diabetes mellitus ([facility])     hx. of diabetes, no meds    Epilepsy ([facility] [address]     GERD (gastroesophageal reflux disease)     History of colon polyps     History of seizures     Hyperlipidemia     Hypertension     Memory difficulty     Neck pain     Obesity     Other malaise and fatigue     Oxygen dependent     at night    Parkinsons disease ([facility])     Restless legs syndrome     SOB (shortness of breath)     Swelling     SWELLING OF HANDS AND FEET    Vision blurred        Current Outpatient Medications   Medication Sig Dispense Refill    HYDROcodone-acetaminophen (NORCO)  MG per tablet 1 TABLET(S) ORAL THREE TIMES A DAY AS NEEDED      escitalopram (LEXAPRO) 10 MG tablet Take 1 tablet by mouth daily 30 tablet 3    atorvastatin (LIPITOR) 40 MG tablet TAKE 1 TABLET BY MOUTH EVERY DAY 30 tablet 3    ondansetron (ZOFRAN) 4 MG tablet TAKE 1 TABLET BY MOUTH EVERY 8 HOURS AS NEEDED FOR NAUSEA AND VOMITING 60 tablet 1    albuterol sulfate  (90 Base) MCG/ACT inhaler INHALE 2 PUFFS EVERY 4HRS AS NEEDED 18 Inhaler 1    rOPINIRole (REQUIP) 0.5 MG tablet Take 1 tablet by mouth every day 30 tablet 5    Ertugliflozin L-PyroglutamicAc (STEGLATRO) 5 MG TABS Take 1 tablet by mouth daily 30 tablet 3    nystatin (MYCOSTATIN) 830538 UNIT/GM powder APPLY 3 TIMES DAILY.  30 g 5    vitamin D (ERGOCALCIFEROL) 75535 units CAPS capsule Take 50,000 Units by mouth twice a week      vitamin B-12 (CYANOCOBALAMIN) 1000 MCG tablet Take 1,000 mcg by mouth daily.  vitamin E 100 UNITS capsule Take 1,000 Units by mouth daily. 2 pills daily      Diazepam (VALIUM PO) Take 5 mg by mouth daily.  lisinopril (PRINIVIL;ZESTRIL) 20 MG tablet TAKE 1 TABLET BY MOUTH EVERY DAY 30 tablet 3    metFORMIN (GLUCOPHAGE) 1000 MG tablet TAKE 1 TABLET BY MOUTH TWICE A DAY WITH MEALS 60 tablet 3    buPROPion (WELLBUTRIN XL) 150 MG extended release tablet TAKE 1 TABLET BY MOUTH EVERY DAY IN THE MORNING 30 tablet 3    Insulin Pen Needle (BD PEN NEEDLE FRANCES U/F) 32G X 4 MM MISC USE AS DIRECTED WITH VICTOZA INJECTION (Patient not taking: Reported on 12/9/2020) 100 each 3    Hydrocodone-Acetaminophen (LORTAB PO)   Take 7.5-500 mg by mouth 3 times daily        No current facility-administered medications for this visit.            Allergies   Allergen Reactions    Oxycodone-Acetaminophen     Oxycodone-Acetaminophen Nausea Only       Past Surgical History:   Procedure Laterality Date    BREAST SURGERY  2013    BG Cyst    CARPAL TUNNEL RELEASE Right 1/25/2019    RIGHT CARPAL TUNNEL RELEASE performed by Heavenly Richards MD at [address] [place] [address] COLONOSCOPY  07/28/2010    Dr Malika Doyle:  HP x 2. 5 yr recall     COLONOSCOPY  9-2-08    Dr Malika Doyle    COLONOSCOPY  7-27-07    Dr Malika Doyle    COLONOSCOPY  08/04/2015    [facility]: normal (5 yr)   [facility] COLONOSCOPY N/A 2/6/2018    Dr Corrales-[facility]--5 yr recall    EYE SURGERY      right cat with implant    HYSTERECTOMY  2003    [place] EGD TRANSORAL BIOPSY SINGLE/MULTIPLE N/A 5/1/2018    Dr Corrales-w/dilation over wire-48 Yemen[place]-mild esophagitis/distal esophageal stricture Laura (-)  Esophagogastritis    PRE-MALIGNANT / BENIGN SKIN LESION EXCISION          UPPER GASTROINTESTINAL ENDOSCOPY  7-    Dr Wilbert Avalos ENDOSCOPY  8-26-08    Dr Mike Loza 2013    Dr Claudia Vazquez:  possible GERD. Biopsies for Walters's and EoE neg    UPPER GASTROINTESTINAL ENDOSCOPY  14    Dr Claudia Vazquez       Social History     Tobacco Use    Smoking status: Former Smoker     Packs/day: 2.50     Years: 30.00     Pack years: 75.00     Types: Cigarettes     Quit date: 2009     Years since quittin.5    Smokeless tobacco: Never Used   Substance Use Topics    Alcohol use: No    Drug use: No       Family History   Problem Relation Age of Onset    Stroke Mother     Diabetes Mother     Heart Disease Mother     Stomach Cancer Father     Colon Cancer Father     Colon Polyps Father     Stomach Cancer Sister     Heart Disease Brother     Heart Disease Sister     Diabetes Sister     Colon Polyps Sister         x2    Esophageal Cancer Neg Hx     Liver Cancer Neg Hx     Liver Disease Neg Hx     Rectal Cancer Neg Hx        /80 (Site: Right Upper Arm, Position: Sitting, Cuff Size: Large Adult)   Pulse 90   Temp 97.3 °F (36.3 °C) (Temporal)   Resp 18   Ht 5' 6\" (1.676 m)   Wt 283 lb (128.4 kg)   SpO2 98%   BMI 45.68 kg/m²     Physical Exam  Vitals signs and nursing note reviewed. Constitutional:       General: She is not in acute distress. Appearance: She is well-developed. She is not diaphoretic. HENT:      Head: Normocephalic and atraumatic. Right Ear: External ear normal.      Left Ear: External ear normal.      Mouth/Throat:      Comments: Mask in place  Eyes:      General: No scleral icterus. Right eye: No discharge. Left eye: No discharge. Conjunctiva/sclera: Conjunctivae normal.   Neck:      Musculoskeletal: Neck supple. Thyroid: No thyromegaly. Trachea: No tracheal deviation. Cardiovascular:      Rate and Rhythm: Normal rate and regular rhythm. Heart sounds: Normal heart sounds. No murmur. No friction rub. No gallop. Pulmonary:      Effort: Pulmonary effort is normal. No respiratory distress. Breath sounds: Normal breath sounds. No wheezing or rales. Abdominal:      General: Bowel sounds are normal. There is no distension. Palpations: Abdomen is soft. Tenderness: There is no abdominal tenderness. Musculoskeletal:         General: No deformity (No gross deformities of upper or lower extremities). Right lower leg: No edema. Left lower leg: No edema. Lymphadenopathy:      Cervical: No cervical adenopathy. Skin:     General: Skin is warm and dry. Findings: No erythema or rash. Neurological:      Mental Status: She is alert and oriented to person, place, and time. Cranial Nerves: No cranial nerve deficit. Psychiatric:         Behavior: Behavior normal.         Thought Content: Thought content normal.         Assessment:       ICD-10-CM    1. Moderate episode of recurrent major depressive disorder (HCC)  F33.1 escitalopram (LEXAPRO) 10 MG tablet    Discussed the transition from Wellbutrin to Lexapro. Discussed proper use of medication and expected course. We will continue to monitor and adjust the course dictates. 2. Type 2 diabetes mellitus without complication, without long-term current use of insulin (HCC)  E11.9 Discussed importance of DM diet and benefits of exercise. Discussed proper use of medication. Stressed proper foot care and monitoring. Discussed the importance of yearly eye exams. 3. Essential hypertension  I10  controlled current medications. Will continue and continue to monitor. 4. Mixed hyperlipidemia  E78.2  tolerating Lipitor. Will continue continue to monitor. 5. Other chronic pain  G89.29  stressed importance of maintaining follow-up with pain management. We will continue to monitor and assist as needed. Plan:  Discussed proper use of medication. Discussed signs and symptoms requiring medical attention. All questions were answered and patient voiced understanding and agreement with plan as discussed.     No orders of the defined types were placed in this encounter. Orders Placed This Encounter   Medications    escitalopram (LEXAPRO) 10 MG tablet     Sig: Take 1 tablet by mouth daily     Dispense:  30 tablet     Refill:  3     Medications Discontinued During This Encounter   Medication Reason    buPROPion (WELLBUTRIN XL) 150 MG extended release tablet      Patient Instructions   Patient given educational handouts and has had all questions answered. Patient voices understanding and agrees to plans along with risks and benefits of plan. Patient is instructed to continue prior meds,diet, and exercise plans as instructed. Patient agrees to follow up as instructed and sooner if needed. Patient agrees to go to ER if condition becomes emergent. Return in about 3 months (around 3/9/2021), or if symptoms worsen or fail to improve.

## 2020-12-15 RX ORDER — BUPROPION HYDROCHLORIDE 150 MG/1
TABLET ORAL
Qty: 30 TABLET | Refills: 3 | Status: SHIPPED | OUTPATIENT
Start: 2020-12-15 | End: 2021-02-09

## 2020-12-29 RX ORDER — LISINOPRIL 20 MG/1
TABLET ORAL
Qty: 30 TABLET | Refills: 3 | Status: SHIPPED | OUTPATIENT
Start: 2020-12-29 | End: 2021-03-30 | Stop reason: SDUPTHER

## 2020-12-29 ASSESSMENT — ENCOUNTER SYMPTOMS
COUGH: 0
EYE PAIN: 0
DIARRHEA: 0
RHINORRHEA: 0
CONSTIPATION: 0
BACK PAIN: 1
VOMITING: 0
SHORTNESS OF BREATH: 0
EYE DISCHARGE: 0
ABDOMINAL PAIN: 0
NAUSEA: 0

## 2020-12-30 NOTE — PATIENT INSTRUCTIONS
· Go to a movie or concert. Take part in a Rastafari activity or other social gathering. Go to a ball game. · Ask a friend to have dinner with you. Take care of yourself  · Eat a balanced diet with plenty of fresh fruits and vegetables, whole grains, and lean protein. If you have lost your appetite, eat small snacks rather than large meals. · Avoid using illegal drugs or marijuana and drinking alcohol. Do not take medicines that have not been prescribed for you. They may interfere with medicines you may be taking for depression, or they may make your depression worse. · Take your medicines exactly as they are prescribed. You may start to feel better within 1 to 3 weeks of taking antidepressant medicine. But it can take as many as 6 to 8 weeks to see more improvement. If you have questions or concerns about your medicines, or if you do not notice any improvement by 3 weeks, talk to your doctor. · Continue to take your medicine after your symptoms improve. Taking your medicine for at least 6 months after you feel better can help keep you from getting depressed again. If this isn't the first time you have been depressed, your doctor may recommend you to take medicine even longer. · If you have any side effects from your medicine, tell your doctor. Many side effects are mild and will go away on their own after you have been taking the medicine for a few weeks. Some may last longer. Talk to your doctor if side effects are bothering you too much. You might be able to try a different medicine. · Continue counseling. It may help prevent depression from returning, especially if you've had multiple episodes of depression. Talk with your counselor if you are having a hard time attending your sessions or you think the sessions aren't working. Don't just stop going. · Get enough sleep. Talk to your doctor if you are having problems sleeping. · Avoid sleeping pills unless they are prescribed by the doctor treating your depression. Sleeping pills may make you groggy during the day, and they may interact with other medicine you are taking. · If you have any other illnesses, such as diabetes, heart disease, or high blood pressure, make sure to continue with your treatment. Tell your doctor about all of the medicines you take, including those with or without a prescription. · If you or someone you know talks about suicide, self-harm, or feeling hopeless, get help right away. Call the [address] [place] [address] at 7-322-878-EMXG ([phone]) or text HOME to 960381 to access the Crisis Text Line. Consider saving these numbers in your phone. When should you call for help? Call 306 anytime you think you may need emergency care. For example, call if:    · You feel like hurting yourself or someone else.     · Someone you know has depression and is about to attempt or is attempting suicide. Call your doctor now or seek immediate medical care if:    · You hear voices.     · Someone you know has depression and:  ? Starts to give away his or her possessions. ? Uses illegal drugs or drinks alcohol heavily. ? Talks or writes about death, including writing suicide notes or talking about guns, knives, or pills. ? Starts to spend a lot of time alone. ? Acts very aggressively or suddenly appears calm. Watch closely for changes in your health, and be sure to contact your doctor if:    · You do not get better as expected. Where can you learn more? Go to https://[employer] [employer]oseNanoleaf.LaunchPoint. org and sign in to your [employer] account. Enter F151 in the [employer] box to learn more about \"Recovering From Depression: Care Instructions. \"     If you do not have an account, please click on the \"Sign Up Now\" link. Current as of: January 31, 2020               Content Version: 12.6  © 9787-6903 [employer]. Care instructions adapted under license by [facility] ([facility]). If you have questions about a medical condition or this instruction, always ask your healthcare professional. [address] any warranty or liability for your use of this information.

## 2021-01-04 DIAGNOSIS — E11.65 TYPE 2 DIABETES MELLITUS WITH HYPERGLYCEMIA, WITHOUT LONG-TERM CURRENT USE OF INSULIN (HCC): ICD-10-CM

## 2021-01-05 RX ORDER — ERTUGLIFLOZIN 5 MG/1
TABLET, FILM COATED ORAL
Qty: 30 TABLET | Refills: 3 | Status: SHIPPED | OUTPATIENT
Start: 2021-01-05 | End: 2021-04-14 | Stop reason: SDUPTHER

## 2021-02-02 ENCOUNTER — TELEPHONE (OUTPATIENT)
Dept: FAMILY MEDICINE CLINIC | Age: 56
End: 2021-02-02

## 2021-02-02 DIAGNOSIS — R11.0 NAUSEA: ICD-10-CM

## 2021-02-02 RX ORDER — ONDANSETRON 4 MG/1
TABLET, FILM COATED ORAL
Qty: 60 TABLET | Refills: 1 | Status: SHIPPED | OUTPATIENT
Start: 2021-02-02 | End: 2021-04-14

## 2021-02-17 DIAGNOSIS — F33.1 MODERATE EPISODE OF RECURRENT MAJOR DEPRESSIVE DISORDER (HCC): ICD-10-CM

## 2021-02-17 RX ORDER — BUPROPION HYDROCHLORIDE 150 MG/1
TABLET ORAL
Qty: 30 TABLET | Refills: 3 | Status: SHIPPED | OUTPATIENT
Start: 2021-02-17 | End: 2021-10-05

## 2021-03-08 RX ORDER — ATORVASTATIN CALCIUM 40 MG/1
TABLET, FILM COATED ORAL
Qty: 90 TABLET | Refills: 1 | Status: SHIPPED | OUTPATIENT
Start: 2021-03-08 | End: 2021-10-26

## 2021-03-13 ENCOUNTER — LAB (OUTPATIENT)
Dept: LAB | Facility: HOSPITAL | Age: 56
End: 2021-03-13

## 2021-03-13 PROCEDURE — U0004 COV-19 TEST NON-CDC HGH THRU: HCPCS | Performed by: PAIN MEDICINE

## 2021-03-30 DIAGNOSIS — I10 ESSENTIAL HYPERTENSION: ICD-10-CM

## 2021-03-30 RX ORDER — LISINOPRIL 20 MG/1
TABLET ORAL
Qty: 30 TABLET | Refills: 5 | Status: SHIPPED | OUTPATIENT
Start: 2021-03-30 | End: 2021-09-28

## 2021-04-03 DIAGNOSIS — G25.0 ESSENTIAL TREMOR: ICD-10-CM

## 2021-04-05 RX ORDER — ROPINIROLE 0.5 MG/1
TABLET, FILM COATED ORAL
Qty: 90 TABLET | Refills: 1 | Status: SHIPPED | OUTPATIENT
Start: 2021-04-05 | End: 2021-09-28

## 2021-04-14 DIAGNOSIS — R11.0 NAUSEA: ICD-10-CM

## 2021-04-14 DIAGNOSIS — E11.65 TYPE 2 DIABETES MELLITUS WITH HYPERGLYCEMIA, WITHOUT LONG-TERM CURRENT USE OF INSULIN (HCC): ICD-10-CM

## 2021-04-14 RX ORDER — ONDANSETRON 4 MG/1
TABLET, FILM COATED ORAL
Qty: 60 TABLET | Refills: 1 | Status: SHIPPED | OUTPATIENT
Start: 2021-04-14 | End: 2021-06-30

## 2021-04-14 RX ORDER — ERTUGLIFLOZIN 5 MG/1
TABLET, FILM COATED ORAL
Qty: 30 TABLET | Refills: 3 | Status: SHIPPED | OUTPATIENT
Start: 2021-04-14 | End: 2021-04-16 | Stop reason: SDUPTHER

## 2021-04-16 DIAGNOSIS — E11.65 TYPE 2 DIABETES MELLITUS WITH HYPERGLYCEMIA, WITHOUT LONG-TERM CURRENT USE OF INSULIN (HCC): ICD-10-CM

## 2021-04-19 RX ORDER — ERTUGLIFLOZIN 5 MG/1
TABLET, FILM COATED ORAL
Qty: 30 TABLET | Refills: 3 | Status: SHIPPED | OUTPATIENT
Start: 2021-04-19 | End: 2021-04-25 | Stop reason: SDUPTHER

## 2021-04-19 RX ORDER — ALBUTEROL SULFATE 90 UG/1
2 AEROSOL, METERED RESPIRATORY (INHALATION) EVERY 4 HOURS PRN
Qty: 18 INHALER | Refills: 1 | Status: SHIPPED | OUTPATIENT
Start: 2021-04-19 | End: 2021-08-24

## 2021-04-25 DIAGNOSIS — E11.65 TYPE 2 DIABETES MELLITUS WITH HYPERGLYCEMIA, WITHOUT LONG-TERM CURRENT USE OF INSULIN (HCC): ICD-10-CM

## 2021-04-26 RX ORDER — ERTUGLIFLOZIN 5 MG/1
TABLET, FILM COATED ORAL
Qty: 30 TABLET | Refills: 3 | Status: SHIPPED | OUTPATIENT
Start: 2021-04-26 | End: 2021-10-05

## 2021-05-23 DIAGNOSIS — F33.1 MODERATE EPISODE OF RECURRENT MAJOR DEPRESSIVE DISORDER (HCC): ICD-10-CM

## 2021-05-24 RX ORDER — ESCITALOPRAM OXALATE 20 MG/1
TABLET ORAL
Qty: 30 TABLET | Refills: 2 | Status: SHIPPED | OUTPATIENT
Start: 2021-05-24 | End: 2021-08-24

## 2021-06-04 ENCOUNTER — NURSE TRIAGE (OUTPATIENT)
Dept: OTHER | Facility: CLINIC | Age: 56
End: 2021-06-04

## 2021-06-04 ENCOUNTER — VIRTUAL VISIT (OUTPATIENT)
Dept: FAMILY MEDICINE CLINIC | Age: 56
End: 2021-06-04
Payer: MEDICAID

## 2021-06-04 DIAGNOSIS — G40.909 NONINTRACTABLE EPILEPSY WITHOUT STATUS EPILEPTICUS, UNSPECIFIED EPILEPSY TYPE (HCC): ICD-10-CM

## 2021-06-04 DIAGNOSIS — G20 PARKINSON DISEASE (HCC): ICD-10-CM

## 2021-06-04 DIAGNOSIS — K21.9 CHRONIC GERD: ICD-10-CM

## 2021-06-04 DIAGNOSIS — J01.90 ACUTE SINUSITIS, RECURRENCE NOT SPECIFIED, UNSPECIFIED LOCATION: ICD-10-CM

## 2021-06-04 DIAGNOSIS — E08.00 DIABETES MELLITUS DUE TO UNDERLYING CONDITION WITH HYPEROSMOLARITY WITHOUT COMA, WITHOUT LONG-TERM CURRENT USE OF INSULIN (HCC): ICD-10-CM

## 2021-06-04 DIAGNOSIS — J30.2 ACUTE SEASONAL ALLERGIC RHINITIS: ICD-10-CM

## 2021-06-04 PROCEDURE — 99213 OFFICE O/P EST LOW 20 MIN: CPT | Performed by: FAMILY MEDICINE

## 2021-06-04 RX ORDER — CIPROFLOXACIN 500 MG/1
500 TABLET, FILM COATED ORAL 2 TIMES DAILY
Qty: 20 TABLET | Refills: 0 | Status: SHIPPED | OUTPATIENT
Start: 2021-06-04 | End: 2021-06-14

## 2021-06-04 RX ORDER — BENZONATATE 200 MG/1
200 CAPSULE ORAL 3 TIMES DAILY PRN
Qty: 30 CAPSULE | Refills: 0 | Status: SHIPPED | OUTPATIENT
Start: 2021-06-04 | End: 2021-06-11

## 2021-06-04 RX ORDER — MONTELUKAST SODIUM 10 MG/1
10 TABLET ORAL DAILY
Qty: 30 TABLET | Refills: 3 | Status: SHIPPED | OUTPATIENT
Start: 2021-06-04 | End: 2021-08-30

## 2021-06-04 ASSESSMENT — ENCOUNTER SYMPTOMS
SHORTNESS OF BREATH: 0
CONSTIPATION: 0
NAUSEA: 0
VOMITING: 0
EYES NEGATIVE: 1
COUGH: 1
WHEEZING: 0
DIARRHEA: 0
BLOOD IN STOOL: 0
CHEST TIGHTNESS: 0

## 2021-06-04 NOTE — TELEPHONE ENCOUNTER
Received call from pre-service center [facility]) [address] with Red Flag Complaint. Brief description of triage: runny nose, sore throat, cough for 2 days. Triage indicates for patient to wants to see PCP today    Care advice provided, patient verbalizes understanding; denies any other questions or concerns; instructed to call back for any new or worsening symptoms. Writer provided warm transfer to Bhakti Soriano at [facility] for appointment scheduling. Attention Provider: Thank you for allowing me to participate in the care of your patient. The patient was connected to triage in response to information provided to the [facility]. Please do not respond through this encounter as the response is not directed to a shared pool. Reason for Disposition   Patient wants to be seen    Answer Assessment - Initial Assessment Questions  1. ONSET: \"When did the throat start hurting? \" (Hours or days ago)       See above    2. SEVERITY: \"How bad is the sore throat? \" (Scale 1-10; mild, moderate or severe)    - MILD (1-3):  doesn't interfere with eating or normal activities    - MODERATE (4-7): interferes with eating some solids and normal activities    - SEVERE (8-10):  excruciating pain, interferes with most normal activities    - SEVERE DYSPHAGIA: can't swallow liquids, drooling      2-3/10    3. STREP EXPOSURE: \"Has there been any exposure to strep within the past week? \" If so, ask: \"What type of contact occurred? \"       Denies    4. VIRAL SYMPTOMS: Metta Huddle there any symptoms of a cold, such as a runny nose, cough, hoarse voice or red eyes? \"       See above. Hoarse voice and red eyes    5. FEVER: \"Do you have a fever? \" If so, ask: \"What is your temperature, how was it measured, and when did it start? \"      Denies    6. PUS ON THE TONSILS: \"Is there pus on the tonsils in the back of your throat? \"      She hasn't looked    7. OTHER SYMPTOMS: \"Do you have any other symptoms? \" (e.g., difficulty breathing, headache, rash) Denies    8. PREGNANCY: \"Is there any chance you are pregnant? \" \"When was your last menstrual period? \"      NA    Protocols used: SORE THROAT-ADULT-OH

## 2021-06-04 NOTE — PROGRESS NOTES
2021    TELEHEALTH EVALUATION -- Audio/Visual (During [facility] public health emergency)  The patient is being consulted today by way of telehealth through doxy. me. This video conference is billed by [id number] [facility] to her insurance. Telehealth is implemented due to the current pandemic in [place] caused by coronavirus. HPI: This patient is usually seen by Dr. Dora Murphy who is her PCP. We are seeing her today due to availability issues. She called today for consultation complaining that she has had cold symptoms now for 3 days. She has had some sinus drainage. She has had some cough but it has been productive of a clear sputum. She is having no problems swallowing. She is able to eat and drink without difficulty. The patient reports no temperature. She does have a fever blister which is visualized today on virtual visit in her left upper lip. She stated that she did not require medicine for it. She used to have fever blisters many years ago when she you would treat with Valtrex. She did not require that today she states. The patient does report that she is a non-smoker as she quit about 9 years ago. The patient does have a history of \"borderline diabetes\" according to her. We did review her record and she is on antidiabetic medication. She also does have previous diagnosis of parkinsonism and epilepsy which reportedly have been stable. For her problem today, I am going to send Singulair 10 mg p.o. daily, Cipro 500 mg p.o. twice daily for 10 days, and Tessalon Perles 200 mg every 8 hours as needed cough with 30 being dispensed. This is being sent to [facility] pharmacy on [address] in [facility] Porsha Wihte (:  1965) has requested an audio/video evaluation for the following concern(s):      Review of Systems   Constitutional: Negative. HENT: Positive for congestion. Eyes: Negative. Respiratory: Positive for cough.  Negative for chest tightness, shortness of breath UNIT/GM powder APPLY 3 TIMES DAILY. Xavier Matos MD   Insulin Pen Needle (BD PEN NEEDLE FRANCES U/F) 32G X 4 MM MISC USE AS DIRECTED WITH VICTOZA INJECTION  Patient not taking: Reported on 2020  Xavier Matos MD   vitamin D (ERGOCALCIFEROL) 23029 units CAPS capsule Take 50,000 Units by mouth twice a week  Historical Provider, MD   vitamin B-12 (CYANOCOBALAMIN) 1000 MCG tablet Take 1,000 mcg by mouth daily. Historical Provider, MD   vitamin E 100 UNITS capsule Take 1,000 Units by mouth daily. 2 pills daily  Historical Provider, MD   Diazepam (VALIUM PO) Take 5 mg by mouth daily.   Historical Provider, MD   Hydrocodone-Acetaminophen (LORTAB PO)   Take 7.5-500 mg by mouth 3 times daily   Historical Provider, MD       Social History     Tobacco Use    Smoking status: Former Smoker     Packs/day: 2.50     Years: 30.00     Pack years: 75.00     Types: Cigarettes     Quit date: 2009     Years since quittin.9    Smokeless tobacco: Never Used   Vaping Use    Vaping Use: Never used   Substance Use Topics    Alcohol use: No    Drug use: No        Allergies   Allergen Reactions    Oxycodone-Acetaminophen     Oxycodone-Acetaminophen Nausea Only   ,   Past Medical History:   Diagnosis Date    Anxiety     Chronic back pain     COPD (chronic obstructive pulmonary disease) (HCC)     Diabetes mellitus (HCC)     hx. of diabetes, no meds    Epilepsy ([address]     GERD (gastroesophageal reflux disease)     History of colon polyps     History of seizures     Hyperlipidemia     Hypertension     Memory difficulty     Neck pain     Obesity     Other malaise and fatigue     Oxygen dependent     at night    Parkinsons disease ([address]     Restless legs syndrome     SOB (shortness of breath)     Swelling     SWELLING OF HANDS AND FEET    Vision blurred    ,   Past Surgical History:   Procedure Laterality Date    BREAST SURGERY      BG Cyst    CARPAL TUNNEL RELEASE Right 2019    RIGHT CARPAL TUNNEL RELEASE performed by Tj Berrios MD at [address]   2010    Dr Peoples:  HP x 2. 5 yr recall     COLONOSCOPY  08    Dr Peoples    COLONOSCOPY  07    Dr Peoples    COLONOSCOPY  2015    [place]: normal (5 yr)   Joy Bobby COLONOSCOPY N/A 2018    Dr Corrales-[facility]--5 yr recall    EYE SURGERY      right cat with implant    HYSTERECTOMY      GA EGD TRANSORAL BIOPSY SINGLE/MULTIPLE N/A 2018    Dr Corrales-w/dilation over wire-48 [place]-mild esophagitis/distal esophageal stricture Laura (-)  Esophagogastritis    PRE-MALIGNANT / BENIGN SKIN LESION EXCISION      BG    UPPER GASTROINTESTINAL ENDOSCOPY  2010    Dr Villela Gautier ENDOSCOPY  08    Dr Metz Poet  2013    Dr Peoples:  possible GERD.  Biopsies for Walters's and EoE neg    UPPER GASTROINTESTINAL ENDOSCOPY  14    Dr Peoples   ,   Social History     Tobacco Use    Smoking status: Former Smoker     Packs/day: 2.50     Years: 30.00     Pack years: 75.00     Types: Cigarettes     Quit date: 2009     Years since quittin.9    Smokeless tobacco: Never Used   Vaping Use    Vaping Use: Never used   Substance Use Topics    Alcohol use: No    Drug use: No   ,   Family History   Problem Relation Age of Onset   Joy Bobby Stroke Mother     Diabetes Mother     Heart Disease Mother     Stomach Cancer Father     Colon Cancer Father     Colon Polyps Father     Stomach Cancer Sister     Heart Disease Brother     Heart Disease Sister     Diabetes Sister     Colon Polyps Sister         x2    Esophageal Cancer Neg Hx     Liver Cancer Neg Hx     Liver Disease Neg Hx     Rectal Cancer Neg Hx    ,   Immunization History   Administered Date(s) Administered    Influenza, Quadv, IM, (6 mo and older Fluzone, Flulaval, Fluarix and 3 yrs and older Afluria) 2017    Tdap (Boostrix, Adacel) 10/10/2016       PHYSICAL EXAMINATION:  [ INSTRUCTIONS:  \"[x]\" Indicates a positive item  \"[]\" Indicates a negative item  -- DELETE ALL ITEMS NOT EXAMINED]  Vital Signs: (As obtained by patient/caregiver or practitioner observation)    Blood pressure-  Heart rate-    Respiratory rate-    Temperature-  Pulse oximetry-     Constitutional: [x] Appears well-developed and well-nourished [x] No apparent distress      [] Abnormal-   Mental status  [x] Alert and awake  [x] Oriented to person/place/time [x]Able to follow commands      Eyes:  EOM    [x]  Normal  [] Abnormal-  Sclera  [x]  Normal  [] Abnormal -         Discharge [x]  None visible  [] Abnormal -    HENT:   [x] Normocephalic, atraumatic. [] Abnormal   [x] Mouth/Throat: Mucous membranes are moist.     External Ears [x] Normal  [] Abnormal-     Neck: [x] No visualized mass     Pulmonary/Chest: [x] Respiratory effort normal.  [x] No visualized signs of difficulty breathing or respiratory distress        [] Abnormal-      Musculoskeletal:   [] Normal gait with no signs of ataxia         [] Normal range of motion of neck        [] Abnormal-       Neurological:        [x] No Facial Asymmetry (Cranial nerve 7 motor function) (limited exam to video visit)          [x] No gaze palsy        [] Abnormal-         Skin:        [] No significant exanthematous lesions or discoloration noted on facial skin         [x] Abnormal-patient does have minimal evidence of cold sore left of midline upper lip. She stated she did not require medicine at this time. She has used Valtrex for these many years ago when she used to have them more frequently. Psychiatric:       [x] Normal Affect [x] No Hallucinations        [] Abnormal-     Other pertinent observable physical exam findings-     ASSESSMENT/PLAN:   Diagnosis Orders   1. Acute sinusitis, recurrence not specified, unspecified location  montelukast (SINGULAIR) 10 MG tablet    benzonatate (TESSALON) 200 MG capsule    ciprofloxacin (CIPRO) 500 MG tablet   2.  Acute appropriate. The patient was located in a state where the provider was credentialed to provide care. Total time spent on this encounter: Not billed by time    --Nancy Ashby MD on 6/4/2021 at 9:19 AM    An electronic signature was used to authenticate this note.

## 2021-06-15 ENCOUNTER — TRANSCRIBE ORDERS (OUTPATIENT)
Dept: LAB | Facility: HOSPITAL | Age: 56
End: 2021-06-15

## 2021-06-15 DIAGNOSIS — Z01.818 PREOP TESTING: Primary | ICD-10-CM

## 2021-06-17 ENCOUNTER — LAB (OUTPATIENT)
Dept: LAB | Facility: HOSPITAL | Age: 56
End: 2021-06-17

## 2021-06-17 LAB — SARS-COV-2 ORF1AB RESP QL NAA+PROBE: NOT DETECTED

## 2021-06-17 PROCEDURE — U0004 COV-19 TEST NON-CDC HGH THRU: HCPCS | Performed by: PAIN MEDICINE

## 2021-06-17 PROCEDURE — C9803 HOPD COVID-19 SPEC COLLECT: HCPCS | Performed by: PAIN MEDICINE

## 2021-06-30 DIAGNOSIS — R11.0 NAUSEA: ICD-10-CM

## 2021-06-30 RX ORDER — ONDANSETRON 4 MG/1
TABLET, FILM COATED ORAL
Qty: 60 TABLET | Refills: 1 | Status: SHIPPED | OUTPATIENT
Start: 2021-06-30 | End: 2021-10-05 | Stop reason: SDUPTHER

## 2021-08-23 DIAGNOSIS — F33.1 MODERATE EPISODE OF RECURRENT MAJOR DEPRESSIVE DISORDER (HCC): ICD-10-CM

## 2021-08-24 RX ORDER — ESCITALOPRAM OXALATE 20 MG/1
TABLET ORAL
Qty: 30 TABLET | Refills: 2 | Status: SHIPPED | OUTPATIENT
Start: 2021-08-24 | End: 2021-10-05

## 2021-08-24 NOTE — TELEPHONE ENCOUNTER
Last office visit Dr. Sarita Borja 06/04/2021, virtual  Last office visit Dr. Heidy Smith 02/09/201, virtual

## 2021-08-24 NOTE — TELEPHONE ENCOUNTER
Last office visit Dr. Sunni Rucker 06/04/2021, virtual  Last office visit Dr. Naif Arteaga 02/09/2021, virtual

## 2021-08-30 DIAGNOSIS — J01.90 ACUTE SINUSITIS, RECURRENCE NOT SPECIFIED, UNSPECIFIED LOCATION: ICD-10-CM

## 2021-08-30 DIAGNOSIS — J30.2 ACUTE SEASONAL ALLERGIC RHINITIS: ICD-10-CM

## 2021-08-30 RX ORDER — MONTELUKAST SODIUM 10 MG/1
TABLET ORAL
Qty: 90 TABLET | Refills: 1 | Status: SHIPPED | OUTPATIENT
Start: 2021-08-30 | End: 2021-10-05

## 2021-09-07 ENCOUNTER — TRANSCRIBE ORDERS (OUTPATIENT)
Dept: LAB | Facility: HOSPITAL | Age: 56
End: 2021-09-07

## 2021-09-07 DIAGNOSIS — Z01.818 PREOP TESTING: Primary | ICD-10-CM

## 2021-09-17 ENCOUNTER — TRANSCRIBE ORDERS (OUTPATIENT)
Dept: LAB | Facility: HOSPITAL | Age: 56
End: 2021-09-17

## 2021-09-18 ENCOUNTER — APPOINTMENT (OUTPATIENT)
Dept: LAB | Facility: HOSPITAL | Age: 56
End: 2021-09-18

## 2021-09-20 ENCOUNTER — LAB (OUTPATIENT)
Dept: LAB | Facility: HOSPITAL | Age: 56
End: 2021-09-20

## 2021-09-20 LAB — SARS-COV-2 ORF1AB RESP QL NAA+PROBE: NOT DETECTED

## 2021-09-20 PROCEDURE — U0004 COV-19 TEST NON-CDC HGH THRU: HCPCS | Performed by: PAIN MEDICINE

## 2021-09-20 PROCEDURE — C9803 HOPD COVID-19 SPEC COLLECT: HCPCS | Performed by: PAIN MEDICINE

## 2021-09-24 DIAGNOSIS — G25.0 ESSENTIAL TREMOR: ICD-10-CM

## 2021-09-24 DIAGNOSIS — I10 ESSENTIAL HYPERTENSION: ICD-10-CM

## 2021-09-28 RX ORDER — LISINOPRIL 20 MG/1
TABLET ORAL
Qty: 90 TABLET | Refills: 1 | Status: SHIPPED | OUTPATIENT
Start: 2021-09-28 | End: 2022-02-28

## 2021-09-28 RX ORDER — ROPINIROLE 0.5 MG/1
TABLET, FILM COATED ORAL
Qty: 90 TABLET | Refills: 1 | Status: SHIPPED | OUTPATIENT
Start: 2021-09-28 | End: 2022-02-28

## 2021-10-04 ENCOUNTER — LAB (OUTPATIENT)
Dept: LAB | Facility: HOSPITAL | Age: 56
End: 2021-10-04

## 2021-10-04 ENCOUNTER — TRANSCRIBE ORDERS (OUTPATIENT)
Dept: ADMINISTRATIVE | Facility: HOSPITAL | Age: 56
End: 2021-10-04

## 2021-10-04 DIAGNOSIS — M19.90 ARTHRITIS: Primary | ICD-10-CM

## 2021-10-04 LAB
CHROMATIN AB SERPL-ACNC: <10 IU/ML (ref 0–14)
CRP SERPL-MCNC: 0.36 MG/DL (ref 0–0.5)
ERYTHROCYTE [SEDIMENTATION RATE] IN BLOOD: 33 MM/HR (ref 0–30)
URATE SERPL-MCNC: 4.2 MG/DL (ref 2.4–5.7)

## 2021-10-04 PROCEDURE — 84550 ASSAY OF BLOOD/URIC ACID: CPT | Performed by: PAIN MEDICINE

## 2021-10-04 PROCEDURE — 85652 RBC SED RATE AUTOMATED: CPT | Performed by: PAIN MEDICINE

## 2021-10-04 PROCEDURE — 36415 COLL VENOUS BLD VENIPUNCTURE: CPT | Performed by: PAIN MEDICINE

## 2021-10-04 PROCEDURE — 86140 C-REACTIVE PROTEIN: CPT | Performed by: PAIN MEDICINE

## 2021-10-04 PROCEDURE — 86038 ANTINUCLEAR ANTIBODIES: CPT | Performed by: PAIN MEDICINE

## 2021-10-04 PROCEDURE — 86431 RHEUMATOID FACTOR QUANT: CPT | Performed by: PAIN MEDICINE

## 2021-10-05 ENCOUNTER — OFFICE VISIT (OUTPATIENT)
Dept: FAMILY MEDICINE CLINIC | Age: 56
End: 2021-10-05
Payer: MEDICAID

## 2021-10-05 VITALS
TEMPERATURE: 97.2 F | HEIGHT: 66 IN | SYSTOLIC BLOOD PRESSURE: 136 MMHG | OXYGEN SATURATION: 98 % | HEART RATE: 86 BPM | DIASTOLIC BLOOD PRESSURE: 80 MMHG | WEIGHT: 274 LBS | BODY MASS INDEX: 44.03 KG/M2

## 2021-10-05 DIAGNOSIS — Z12.31 ENCOUNTER FOR SCREENING MAMMOGRAM FOR MALIGNANT NEOPLASM OF BREAST: ICD-10-CM

## 2021-10-05 DIAGNOSIS — F33.1 MODERATE EPISODE OF RECURRENT MAJOR DEPRESSIVE DISORDER (HCC): Primary | ICD-10-CM

## 2021-10-05 DIAGNOSIS — E55.9 VITAMIN D DEFICIENCY: ICD-10-CM

## 2021-10-05 DIAGNOSIS — E78.5 HYPERLIPIDEMIA, UNSPECIFIED HYPERLIPIDEMIA TYPE: ICD-10-CM

## 2021-10-05 DIAGNOSIS — I10 PRIMARY HYPERTENSION: ICD-10-CM

## 2021-10-05 DIAGNOSIS — R11.0 NAUSEA: ICD-10-CM

## 2021-10-05 DIAGNOSIS — E11.65 TYPE 2 DIABETES MELLITUS WITH HYPERGLYCEMIA, WITHOUT LONG-TERM CURRENT USE OF INSULIN (HCC): ICD-10-CM

## 2021-10-05 DIAGNOSIS — R53.83 FATIGUE, UNSPECIFIED TYPE: ICD-10-CM

## 2021-10-05 LAB
ANA HOMOGEN TITR SER: ABNORMAL {TITER}
ANA TITR SER IF: POSITIVE {TITER}
Lab: ABNORMAL

## 2021-10-05 PROCEDURE — 99214 OFFICE O/P EST MOD 30 MIN: CPT | Performed by: FAMILY MEDICINE

## 2021-10-05 RX ORDER — VENLAFAXINE HYDROCHLORIDE 75 MG/1
75 CAPSULE, EXTENDED RELEASE ORAL DAILY
Qty: 30 CAPSULE | Refills: 3 | Status: SHIPPED | OUTPATIENT
Start: 2021-10-05 | End: 2021-10-28 | Stop reason: SDUPTHER

## 2021-10-05 RX ORDER — ONDANSETRON 4 MG/1
4 TABLET, FILM COATED ORAL EVERY 8 HOURS PRN
Qty: 60 TABLET | Refills: 1 | Status: SHIPPED | OUTPATIENT
Start: 2021-10-05 | End: 2022-01-24

## 2021-10-05 NOTE — PROGRESS NOTES
[facility] LOAI[facility]  [address] [id number]  Dept: [phone]  Dept Fax: [phone]    Visit type: Established patient    Reason for Visit: Discuss Medications         Assessment and Plan       1. Moderate episode of recurrent major depressive disorder (HCC)  -     venlafaxine (EFFEXOR XR) 75 MG extended release capsule; Take 1 capsule by mouth daily, Disp-30 capsule, R-3Normal  -     External Referral To Psychiatry  2. Type 2 diabetes mellitus with hyperglycemia, without long-term current use of insulin (HCC)  -     metFORMIN (GLUCOPHAGE) 1000 MG tablet; TAKE 1 TABLET BY MOUTH TWICE A DAY WITH MEALS, Disp-60 tablet, R-3Normal  -     CBC Auto Differential; Future  -     Comprehensive Metabolic Panel; Future  -     Hemoglobin A1C; Future  -     Microalbumin / Creatinine Urine Ratio; Future  3. Primary hypertension  4. Hyperlipidemia, unspecified hyperlipidemia type  -     Lipid Panel; Future  5. Fatigue, unspecified type  -     TSH without Reflex; Future  -     Vitamin B12; Future  6. Nausea  -     ondansetron (ZOFRAN) 4 MG tablet; Take 1 tablet by mouth every 8 hours as needed for Nausea or Vomiting, Disp-60 tablet, R-1Normal  7. Vitamin D deficiency  -     Vitamin D 25 Hydroxy; Future  8. Encounter for screening mammogram for malignant neoplasm of breast  -     MOUNA DIGITAL SCREEN W OR WO CAD BILATERAL; Future    Discussed possibility of trial of Effexor and with her desires to be set up with psychiatry we will do so at this time. Discussed lifestyle modifications that may beneficial. Discussed importance of being diligent with her diet and medications and continue home monitoring of her blood pressure. Discussed signs symptoms require medical attention. All questions were answered and patient voiced understanding and agreement with plan as discussed. Return in about 3 months (around 1/5/2022), or if symptoms worsen or fail to improve.        Subjective       HPI   Patient has a history of depression and states that she has been having some issues with her mood and some issues with fatigue. Will attempt a trial of Lexapro as she was still having issues with her mood while she was taking Wellbutrin but reports that the Lexapro has not been helping. She states that she has been having some issues sleeping and is felt overwhelmed at times. She just does not feel like the happy-go-wang individual that she is used to and because of that she is interested in seeing psychiatry for further evaluation and management. She denies any thoughts of harming herself or others. Patient has a history of type 2 diabetes and is doing well with their current medication. she denies any issues with the use of the medicine. she denies any new symptoms associated with their diabetes. she is attempting to be diligent with her diet and compliant with her medication. Patient has arterial hypertension that is doing well with her current medication. she denies any issues with use of her  medicine. she denies any symptoms associated with abnormal blood pressures. she is attempting to avoid excess salt intake. Patient has hyperlipidemia and reports that she is tolerating her Lipitor without any new myalgias or GI upsets. she is attempting to watch her diet and trying to stay physically active. Review of Systems   Constitutional: Negative for activity change, appetite change, fatigue and fever. HENT: Negative for congestion and rhinorrhea. Eyes: Negative for pain and discharge. Respiratory: Negative for cough and shortness of breath. Cardiovascular: Negative for chest pain and palpitations. Gastrointestinal: Negative for abdominal pain, constipation, diarrhea, nausea and vomiting. Endocrine: Negative for cold intolerance and heat intolerance. Genitourinary: Negative for dysuria and hematuria. Musculoskeletal: Positive for back pain. Negative for neck pain. Skin: Negative for rash and wound. Neurological: Negative for syncope and weakness. Hematological: Negative for adenopathy. Does not bruise/bleed easily. Psychiatric/Behavioral: Positive for dysphoric mood. Negative for sleep disturbance. The patient is not nervous/anxious. Allergies   Allergen Reactions    Oxycodone-Acetaminophen     Oxycodone-Acetaminophen Nausea Only       Outpatient Medications Prior to Visit   Medication Sig Dispense Refill    lisinopril (PRINIVIL;ZESTRIL) 20 MG tablet TAKE 1 TABLET BY MOUTH EVERY DAY 90 tablet 1    rOPINIRole (REQUIP) 0.5 MG tablet TAKE 1 TABLET BY MOUTH EVERY DAY 90 tablet 1    PROAIR  (90 Base) MCG/ACT inhaler INHALE 2 PUFFS BY MOUTH EVERY 4 HOURS AS NEEDED FOR WHEEZE 8.5 Inhaler 1    atorvastatin (LIPITOR) 40 MG tablet TAKE 1 TABLET BY MOUTH EVERY DAY 90 tablet 1    HYDROcodone-acetaminophen (NORCO)  MG per tablet 1 TABLET(S) ORAL THREE TIMES A DAY AS NEEDED      nystatin (MYCOSTATIN) 361369 UNIT/GM powder APPLY 3 TIMES DAILY. 30 g 5    Diazepam (VALIUM PO) Take 5 mg by mouth daily.       Hydrocodone-Acetaminophen (LORTAB PO)   Take 7.5-500 mg by mouth 3 times daily       montelukast (SINGULAIR) 10 MG tablet TAKE 1 TABLET BY MOUTH EVERY DAY 90 tablet 1    escitalopram (LEXAPRO) 20 MG tablet TAKE 1 TABLET BY MOUTH EVERY DAY 30 tablet 2    ondansetron (ZOFRAN) 4 MG tablet TAKE 1 TABLET BY MOUTH EVERY 8 HOURS AS NEEDED FOR NAUSEA AND VOMITING 60 tablet 1    Ertugliflozin L-PyroglutamicAc (STEGLATRO) 5 MG TABS TAKE 1 TABLET BY MOUTH EVERY DAY 30 tablet 3    metFORMIN (GLUCOPHAGE) 1000 MG tablet TAKE 1 TABLET BY MOUTH TWICE A DAY WITH MEALS 60 tablet 3    buPROPion (WELLBUTRIN XL) 150 MG extended release tablet TAKE 1 TABLET BY MOUTH EVERY DAY IN THE MORNING 30 tablet 3    Insulin Pen Needle (BD PEN NEEDLE FRANCES U/F) 32G X 4 MM MISC USE AS DIRECTED WITH VICTOZA INJECTION 100 each 3    vitamin D (ERGOCALCIFEROL) 39468 units CAPS capsule Take 50,000 Units by mouth twice a week      vitamin B-12 (CYANOCOBALAMIN) 1000 MCG tablet Take 1,000 mcg by mouth daily.  vitamin E 100 UNITS capsule Take 1,000 Units by mouth daily. 2 pills daily       No facility-administered medications prior to visit.         Past Medical History:   Diagnosis Date    Anxiety     Chronic back pain     COPD (chronic obstructive pulmonary disease) (HCC)     Diabetes mellitus (HCC)     hx. of diabetes, no meds    Epilepsy ([address]     GERD (gastroesophageal reflux disease)     History of colon polyps     History of seizures     Hyperlipidemia     Hypertension     Memory difficulty     Neck pain     Obesity     Other malaise and fatigue     Oxygen dependent     at night    Parkinsons disease (HCC)     Restless legs syndrome     SOB (shortness of breath)     Swelling     SWELLING OF HANDS AND FEET    Vision blurred         Social History     Tobacco Use    Smoking status: Former Smoker     Packs/day: 2.50     Years: 30.00     Pack years: 75.00     Types: Cigarettes     Quit date: 2009     Years since quittin.3    Smokeless tobacco: Never Used   Substance Use Topics    Alcohol use: No        Past Surgical History:   Procedure Laterality Date    BREAST SURGERY      BG Cyst    CARPAL TUNNEL RELEASE Right 2019    RIGHT CARPAL TUNNEL RELEASE performed by Norman Le MD at [address] [place] [address] COLONOSCOPY  2010    Dr Isauro Rivera:  HP x 2. 5 yr recall     COLONOSCOPY  08    Dr Isauro Rivera    COLONOSCOPY  07    Dr Isauro Rivera    COLONOSCOPY  2015    [place]: normal (5 yr)   [facility] COLONOSCOPY N/A 2018    Dr [facility]--5 yr recall    EYE SURGERY      right cat with implant    HYSTERECTOMY  2003    MD EGD TRANSORAL BIOPSY SINGLE/MULTIPLE N/A 2018    Dr Corrales-w/dilation over wire-48 [place]-mild esophagitis/distal esophageal stricture Laura (-)  Esophagogastritis    PRE-MALIGNANT / BENIGN SKIN LESION EXCISION      BG    UPPER GASTROINTESTINAL ENDOSCOPY abdominal tenderness. Musculoskeletal:         General: No deformity (No gross deformities of upper or lower extremities). Cervical back: Neck supple. Right lower leg: No edema. Left lower leg: No edema. Lymphadenopathy:      Cervical: No cervical adenopathy. Skin:     General: Skin is warm and dry. Findings: No erythema or rash. Neurological:      Mental Status: She is alert and oriented to person, place, and time. Cranial Nerves: No cranial nerve deficit. Psychiatric:         Behavior: Behavior normal.         Thought Content:  Thought content normal.           Data Reviewed and Summarized       Labs:     Imaging/Testing:        Abril Samayoa MD

## 2021-10-19 ASSESSMENT — ENCOUNTER SYMPTOMS
RHINORRHEA: 0
SHORTNESS OF BREATH: 0
EYE DISCHARGE: 0
NAUSEA: 0
EYE PAIN: 0
VOMITING: 0
ABDOMINAL PAIN: 0
BACK PAIN: 1
COUGH: 0
DIARRHEA: 0
CONSTIPATION: 0

## 2021-10-20 NOTE — PATIENT INSTRUCTIONS
you are having problems. It's also a good idea to know your test results and keep a list of the medicines you take. How can you care for yourself at home? Be realistic  · If you have a large task to do, break it up into smaller steps you can handle, and just do what you can. · You may want to put off important decisions until your depression has lifted. If you have plans that will have a major impact on your life, such as marriage, divorce, or a job change, try to wait a bit. Talk it over with friends and loved ones who can help you look at the overall picture first.  · Reaching out to people for help is important. Do not isolate yourself. Let your family and friends help you. Find someone you can trust and confide in, and talk to that person. · Be patient, and be kind to yourself. Remember that depression is not your fault and is not something you can overcome with willpower alone. Treatment is important for depression, just like for any other illness. Feeling better takes time, and your mood will improve little by little. Stay active  · Stay busy and get outside. Take a walk, or try some other light exercise. · Talk with your doctor about an exercise program. Exercise can help with mild depression. · Go to a movie or concert. Take part in a Confucianist activity or other social gathering. Go to a ball game. · Ask a friend to have dinner with you. Take care of yourself  · Eat a balanced diet with plenty of fresh fruits and vegetables, whole grains, and lean protein. If you have lost your appetite, eat small snacks rather than large meals. · Avoid using illegal drugs or marijuana and drinking alcohol. Do not take medicines that have not been prescribed for you. They may interfere with medicines you may be taking for depression, or they may make your depression worse. · Take your medicines exactly as they are prescribed. You may start to feel better within 1 to 3 weeks of taking antidepressant medicine.  But it can take as many as 6 to 8 weeks to see more improvement. If you have questions or concerns about your medicines, or if you do not notice any improvement by 3 weeks, talk to your doctor. · Continue to take your medicine after your symptoms improve. Taking your medicine for at least 6 months after you feel better can help keep you from getting depressed again. If this isn't the first time you have been depressed, your doctor may recommend you to take medicine even longer. · If you have any side effects from your medicine, tell your doctor. Many side effects are mild and will go away on their own after you have been taking the medicine for a few weeks. Some may last longer. Talk to your doctor if side effects are bothering you too much. You might be able to try a different medicine. · Continue counseling. It may help prevent depression from returning, especially if you've had multiple episodes of depression. Talk with your counselor if you are having a hard time attending your sessions or you think the sessions aren't working. Don't just stop going. · Get enough sleep. Talk to your doctor if you are having problems sleeping. · Avoid sleeping pills unless they are prescribed by the doctor treating your depression. Sleeping pills may make you groggy during the day, and they may interact with other medicine you are taking. · If you have any other illnesses, such as diabetes, heart disease, or high blood pressure, make sure to continue with your treatment. Tell your doctor about all of the medicines you take, including those with or without a prescription. · If you or someone you know talks about suicide, self-harm, or feeling hopeless, get help right away. Call the [facility] at 1-800-273-talk (1-799.371.2426) or text HOME to 619897 to access the Crisis Text Line. Consider saving these numbers in your phone. When should you call for help?    Call 352 anytime you think you may need emergency care. For example, call if:    · You feel like hurting yourself or someone else.     · Someone you know has depression and is about to attempt or is attempting suicide. Call your doctor now or seek immediate medical care if:    · You hear voices.     · Someone you know has depression and:  ? Starts to give away his or her possessions. ? Uses illegal drugs or drinks alcohol heavily. ? Talks or writes about death, including writing suicide notes or talking about guns, knives, or pills. ? Starts to spend a lot of time alone. ? Acts very aggressively or suddenly appears calm. Watch closely for changes in your health, and be sure to contact your doctor if:    · You do not get better as expected. Where can you learn more? Go to [email or web address] and sign in to your [facility] account. Enter N280 in the [employer] box to learn more about \"Recovering From Depression: Care Instructions. \"     If you do not have an account, please click on the \"Sign Up Now\" link. Current as of: June 16, 2021               Content Version: 13.0  © 9080-9898 Healthwise, Incorporated. Care instructions adapted under license by [facility] ([facility]). If you have questions about a medical condition or this instruction, always ask your healthcare professional. [address] any warranty or liability for your use of this information.

## 2021-10-26 RX ORDER — ATORVASTATIN CALCIUM 40 MG/1
TABLET, FILM COATED ORAL
Qty: 90 TABLET | Refills: 1 | Status: SHIPPED | OUTPATIENT
Start: 2021-10-26 | End: 2022-02-28

## 2021-10-28 ENCOUNTER — TELEPHONE (OUTPATIENT)
Dept: FAMILY MEDICINE CLINIC | Age: 56
End: 2021-10-28

## 2021-10-28 DIAGNOSIS — F33.1 MODERATE EPISODE OF RECURRENT MAJOR DEPRESSIVE DISORDER (HCC): ICD-10-CM

## 2021-10-28 RX ORDER — VENLAFAXINE HYDROCHLORIDE 75 MG/1
75 CAPSULE, EXTENDED RELEASE ORAL DAILY
Qty: 90 CAPSULE | Refills: 1 | Status: SHIPPED | OUTPATIENT
Start: 2021-10-28 | End: 2022-03-28

## 2021-10-28 NOTE — TELEPHONE ENCOUNTER
Received fax from patient's pharmacy requesting 90 day supply sent in for insurance purposes.      Last OV 10/5/21

## 2021-12-16 ENCOUNTER — TRANSCRIBE ORDERS (OUTPATIENT)
Dept: LAB | Facility: HOSPITAL | Age: 56
End: 2021-12-16

## 2021-12-16 DIAGNOSIS — Z01.818 PREOP TESTING: Primary | ICD-10-CM

## 2021-12-27 RX ORDER — GABAPENTIN 300 MG/1
CAPSULE ORAL
COMMUNITY
Start: 2021-10-29

## 2021-12-27 RX ORDER — CALCIPOTRIENE 50 UG/G
CREAM TOPICAL
COMMUNITY
Start: 2021-09-28 | End: 2022-04-28 | Stop reason: ALTCHOICE

## 2021-12-27 RX ORDER — DICLOFENAC SODIUM 30 MG/G
GEL TOPICAL
COMMUNITY
Start: 2021-09-28 | End: 2022-04-28 | Stop reason: ALTCHOICE

## 2021-12-27 NOTE — TELEPHONE ENCOUNTER
Last OV 10/5/2021  Next OV Visit date not found      Requested Prescriptions     Pending Prescriptions Disp Refills    PROAIR  (90 Base) MCG/ACT inhaler [Pharmacy Med Name: PROAIR HFA 90 MCG INHALER] 8.5 each 1     Sig: INHALE 2 PUFFS BY MOUTH EVERY 4 HOURS AS NEEDED FOR WHEEZE

## 2022-01-24 DIAGNOSIS — R11.0 NAUSEA: ICD-10-CM

## 2022-01-24 RX ORDER — ONDANSETRON 4 MG/1
TABLET, FILM COATED ORAL
Qty: 60 TABLET | Refills: 1 | Status: SHIPPED | OUTPATIENT
Start: 2022-01-24 | End: 2022-01-25 | Stop reason: SDUPTHER

## 2022-01-24 NOTE — TELEPHONE ENCOUNTER
Last OV 10/5/2021  Next OV Visit date not found      Requested Prescriptions     Pending Prescriptions Disp Refills    ondansetron (ZOFRAN) 4 MG tablet [Pharmacy Med Name: ONDANSETRON HCL 4 MG TABLET] 60 tablet 1     Sig: TAKE 1 TABLET BY MOUTH EVERY 8 HOURS AS NEEDED FOR NAUSEA AND VOMITING

## 2022-01-25 RX ORDER — ONDANSETRON 4 MG/1
TABLET, FILM COATED ORAL
Qty: 60 TABLET | Refills: 1 | Status: SHIPPED | OUTPATIENT
Start: 2022-01-25 | End: 2022-04-18

## 2022-02-23 ENCOUNTER — TRANSCRIBE ORDERS (OUTPATIENT)
Dept: ADMINISTRATIVE | Facility: HOSPITAL | Age: 57
End: 2022-02-23

## 2022-02-23 DIAGNOSIS — M51.36 DEGENERATION OF LUMBAR INTERVERTEBRAL DISC: Primary | ICD-10-CM

## 2022-02-28 DIAGNOSIS — G25.0 ESSENTIAL TREMOR: ICD-10-CM

## 2022-02-28 DIAGNOSIS — F33.1 MODERATE EPISODE OF RECURRENT MAJOR DEPRESSIVE DISORDER (HCC): ICD-10-CM

## 2022-02-28 DIAGNOSIS — I10 ESSENTIAL HYPERTENSION: ICD-10-CM

## 2022-02-28 RX ORDER — ATORVASTATIN CALCIUM 40 MG/1
TABLET, FILM COATED ORAL
Qty: 90 TABLET | Refills: 1 | Status: SHIPPED | OUTPATIENT
Start: 2022-02-28 | End: 2022-04-28 | Stop reason: SDUPTHER

## 2022-02-28 RX ORDER — ROPINIROLE 0.5 MG/1
TABLET, FILM COATED ORAL
Qty: 90 TABLET | Refills: 1 | Status: SHIPPED | OUTPATIENT
Start: 2022-02-28 | End: 2022-06-16 | Stop reason: SDUPTHER

## 2022-02-28 RX ORDER — LISINOPRIL 20 MG/1
TABLET ORAL
Qty: 90 TABLET | Refills: 1 | Status: SHIPPED | OUTPATIENT
Start: 2022-02-28 | End: 2022-04-28 | Stop reason: SDUPTHER

## 2022-02-28 RX ORDER — ESCITALOPRAM OXALATE 20 MG/1
TABLET ORAL
Qty: 30 TABLET | Refills: 2 | OUTPATIENT
Start: 2022-02-28

## 2022-03-14 ENCOUNTER — APPOINTMENT (OUTPATIENT)
Dept: MRI IMAGING | Facility: HOSPITAL | Age: 57
End: 2022-03-14

## 2022-03-15 NOTE — TELEPHONE ENCOUNTER
A catheter was exchanged for a (Rush - Cath Impulse 6f 100cm Al1) catheter.  The patient has not been in the office in over 2 years, she needs appointment, she missed her last one with [facility].

## 2022-03-16 ENCOUNTER — HOSPITAL ENCOUNTER (OUTPATIENT)
Dept: MRI IMAGING | Facility: HOSPITAL | Age: 57
Discharge: HOME | End: 2022-03-16

## 2022-03-16 DIAGNOSIS — M51.36 DEGENERATION OF LUMBAR INTERVERTEBRAL DISC: ICD-10-CM

## 2022-03-25 ENCOUNTER — TRANSCRIBE ORDERS (OUTPATIENT)
Dept: ADMINISTRATIVE | Facility: HOSPITAL | Age: 57
End: 2022-03-25

## 2022-03-25 ENCOUNTER — HOSPITAL ENCOUNTER (OUTPATIENT)
Dept: MRI IMAGING | Facility: HOSPITAL | Age: 57
Discharge: HOME | End: 2022-03-25

## 2022-03-25 ENCOUNTER — HOSPITAL ENCOUNTER (OUTPATIENT)
Dept: GENERAL RADIOLOGY | Facility: HOSPITAL | Age: 57
Discharge: HOME | End: 2022-03-25

## 2022-03-25 DIAGNOSIS — M47.812 CERVICAL SPONDYLOSIS WITHOUT MYELOPATHY: Primary | ICD-10-CM

## 2022-03-25 PROCEDURE — 72148 MRI LUMBAR SPINE W/O DYE: CPT

## 2022-03-25 PROCEDURE — 72040 X-RAY EXAM NECK SPINE 2-3 VW: CPT

## 2022-03-26 DIAGNOSIS — F33.1 MODERATE EPISODE OF RECURRENT MAJOR DEPRESSIVE DISORDER (HCC): ICD-10-CM

## 2022-03-28 RX ORDER — ESCITALOPRAM OXALATE 20 MG/1
TABLET ORAL
Qty: 30 TABLET | Refills: 2 | Status: SHIPPED | OUTPATIENT
Start: 2022-03-28 | End: 2022-04-28 | Stop reason: ALTCHOICE

## 2022-03-28 RX ORDER — VENLAFAXINE HYDROCHLORIDE 75 MG/1
CAPSULE, EXTENDED RELEASE ORAL
Qty: 90 CAPSULE | Refills: 1 | Status: SHIPPED | OUTPATIENT
Start: 2022-03-28 | End: 2022-04-28

## 2022-04-13 ENCOUNTER — TELEPHONE (OUTPATIENT)
Dept: FAMILY MEDICINE CLINIC | Age: 57
End: 2022-04-13

## 2022-04-13 NOTE — TELEPHONE ENCOUNTER
Nurse Triage line Call came in from [facility]. C/O nausea , vomiting and diarrhea. Since Saturday . Last week started with a headache. Her belly is cramping she is very fatigued and weak . I referred her to go to Urgent care at [facility] as we have no openings today and our Walk In clinic is closed . Patient expressed understanding and she will go to urgent care this afternoon after she lays down for a while .

## 2022-04-14 ENCOUNTER — OFFICE VISIT (OUTPATIENT)
Age: 57
End: 2022-04-14
Payer: MEDICAID

## 2022-04-14 VITALS
HEART RATE: 75 BPM | BODY MASS INDEX: 45.32 KG/M2 | HEIGHT: 65 IN | OXYGEN SATURATION: 98 % | SYSTOLIC BLOOD PRESSURE: 132 MMHG | WEIGHT: 272 LBS | TEMPERATURE: 97 F | RESPIRATION RATE: 14 BRPM | DIASTOLIC BLOOD PRESSURE: 82 MMHG

## 2022-04-14 DIAGNOSIS — A08.4 VIRAL GASTROENTERITIS: Primary | ICD-10-CM

## 2022-04-14 DIAGNOSIS — Z11.52 ENCOUNTER FOR SCREENING FOR COVID-19: ICD-10-CM

## 2022-04-14 DIAGNOSIS — J02.9 SORE THROAT: ICD-10-CM

## 2022-04-14 LAB
S PYO AG THROAT QL: NORMAL
SARS-COV-2, PCR: NOT DETECTED

## 2022-04-14 PROCEDURE — 87880 STREP A ASSAY W/OPTIC: CPT | Performed by: NURSE PRACTITIONER

## 2022-04-14 PROCEDURE — 99213 OFFICE O/P EST LOW 20 MIN: CPT | Performed by: NURSE PRACTITIONER

## 2022-04-14 ASSESSMENT — ENCOUNTER SYMPTOMS
VOMITING: 1
DIARRHEA: 1
RHINORRHEA: 0
SHORTNESS OF BREATH: 0
COLOR CHANGE: 0
CONSTIPATION: 0
EYE ITCHING: 0
EYE DISCHARGE: 0
SINUS PRESSURE: 0
BLOOD IN STOOL: 0
WHEEZING: 0
NAUSEA: 1
ABDOMINAL PAIN: 1
COUGH: 1
SORE THROAT: 0

## 2022-04-14 NOTE — PROGRESS NOTES
[address]  [address] [facility] [phone] [id number]  Dept: [phone]  Dept Fax: [phone]  Loc: [phone]    Charmaine iCd is a 60 y.o. female who presents today for her medical conditions/complaints as noted below. Charmaine Cid is complaining of Abdominal Pain (upper and LLQ regions ), Fever, Emesis, Pharyngitis, Diarrhea, and Fatigue        HPI:   Emesis   This is a new problem. The current episode started in the past 7 days (3 days ago). The problem occurs 2 to 4 times per day. The problem has been gradually improving. The emesis has an appearance of stomach contents. The maximum temperature recorded prior to her arrival was 100.4 - 100.9 F. The fever has been present for less than 1 day. Associated symptoms include abdominal pain, coughing, diarrhea, a fever and headaches. Pertinent negatives include no chest pain, chills, dizziness or myalgias. She has tried acetaminophen for the symptoms. The treatment provided mild relief.    No known COVID or flu exposure recently      Past Medical History:   Diagnosis Date    Anxiety     Chronic back pain     COPD (chronic obstructive pulmonary disease) (HCC)     Diabetes mellitus (HCC)     hx. of diabetes, no meds    Epilepsy ([facility] [address]     GERD (gastroesophageal reflux disease)     History of colon polyps     History of seizures     Hyperlipidemia     Hypertension     Memory difficulty     Neck pain     Obesity     Other malaise and fatigue     Oxygen dependent     at night    Parkinsons disease (HCC)     Restless legs syndrome     SOB (shortness of breath)     Swelling     SWELLING OF HANDS AND FEET    Vision blurred        Past Surgical History:   Procedure Laterality Date    BREAST SURGERY  2013    BG Cyst    CARPAL TUNNEL RELEASE Right 1/25/2019    RIGHT CARPAL TUNNEL RELEASE performed by Nora Springer MD at [address]   07/28/2010    Dr Liv Rodriguez:  HP x 2. 5 yr recall     COLONOSCOPY  08    Dr Morelos Going    COLONOSCOPY  07    Dr Morelos Going    COLONOSCOPY  2015    [place]: normal (5 yr)   Blase Liming COLONOSCOPY N/A 2018    Dr Corrales[facility]--5 yr recall    EYE SURGERY      right cat with implant    HYSTERECTOMY  2003    [place] EGD TRANSORAL BIOPSY SINGLE/MULTIPLE N/A 2018    Dr Corrales-w/dilation over wire-48 [place]-mild esophagitis/distal esophageal stricture Laura (-)  Esophagogastritis    PRE-MALIGNANT / BENIGN SKIN LESION EXCISION      BG    UPPER GASTROINTESTINAL ENDOSCOPY  2010    Dr Espinoza More ENDOSCOPY  08    Dr Davila Cords  2013    Dr Morelos Going:  possible GERD.  Biopsies for Walters's and EoE neg    UPPER GASTROINTESTINAL ENDOSCOPY  14    Dr Morelos Going       Family History   Problem Relation Age of Onset    Stroke Mother     Diabetes Mother     Heart Disease Mother     Stomach Cancer Father     Colon Cancer Father     Colon Polyps Father     Stomach Cancer Sister     Heart Disease Brother     Heart Disease Sister     Diabetes Sister     Colon Polyps Sister         x2    Esophageal Cancer Neg Hx     Liver Cancer Neg Hx     Liver Disease Neg Hx     Rectal Cancer Neg Hx        Social History     Tobacco Use    Smoking status: Former Smoker     Packs/day: 2.50     Years: 30.00     Pack years: 75.00     Types: Cigarettes     Quit date: 2009     Years since quittin.8    Smokeless tobacco: Never Used   Substance Use Topics    Alcohol use: No        Current Outpatient Medications   Medication Sig Dispense Refill    venlafaxine (EFFEXOR XR) 75 MG extended release capsule TAKE 1 CAPSULE BY MOUTH EVERY DAY 90 capsule 1    lisinopril (PRINIVIL;ZESTRIL) 20 MG tablet TAKE 1 TABLET BY MOUTH EVERY DAY 90 tablet 1    atorvastatin (LIPITOR) 40 MG tablet TAKE 1 TABLET BY MOUTH EVERY DAY 90 tablet 1    rOPINIRole (REQUIP) 0.5 MG tablet TAKE 1 TABLET BY MOUTH EVERY DAY 90 tablet 1    ondansetron (ZOFRAN) 4 MG tablet TAKE 1 TABLET BY MOUTH EVERY 8 HOURS AS NEEDED FOR NAUSEA AND VOMITING 60 tablet 1    metFORMIN (GLUCOPHAGE) 1000 MG tablet TAKE 1 TABLET BY MOUTH TWICE A DAY WITH MEALS 60 tablet 3    Diazepam (VALIUM PO) Take 5 mg by mouth daily.  Hydrocodone-Acetaminophen (LORTAB PO)   Take 7.5-500 mg by mouth 3 times daily       escitalopram (LEXAPRO) 20 MG tablet TAKE 1 TABLET BY MOUTH EVERY DAY (Patient not taking: Reported on 4/14/2022) 30 tablet 2    PROAIR  (90 Base) MCG/ACT inhaler INHALE 2 PUFFS BY MOUTH EVERY 4 HOURS AS NEEDED FOR WHEEZING (Patient not taking: Reported on 4/14/2022) 8.5 each 1    calcipotriene (DOVONEX) 0.005 % cream  (Patient not taking: Reported on 4/14/2022)      Diclofenac Sodium 3 % GEL  (Patient not taking: Reported on 4/14/2022)      gabapentin (NEURONTIN) 300 MG capsule  (Patient not taking: Reported on 4/14/2022)      HYDROcodone-acetaminophen (NORCO)  MG per tablet 1 TABLET(S) ORAL THREE TIMES A DAY AS NEEDED (Patient not taking: Reported on 4/14/2022)      nystatin (MYCOSTATIN) 309707 UNIT/GM powder APPLY 3 TIMES DAILY. (Patient not taking: Reported on 4/14/2022) 30 g 5     No current facility-administered medications for this visit.        Allergies   Allergen Reactions    Oxycodone-Acetaminophen     Oxycodone-Acetaminophen Nausea Only       Health Maintenance   Topic Date Due    Hepatitis C screen  Never done    COVID-19 Vaccine (1) Never done    Pneumococcal 0-64 years Vaccine (1 - PCV) Never done    Diabetic foot exam  Never done    Depression Monitoring  Never done    HIV screen  Never done    Diabetic retinal exam  Never done    Hepatitis B vaccine (1 of 3 - Risk 3-dose series) Never done    Shingles Vaccine (1 of 2) Never done    Low dose CT lung screening  11/18/2013    Breast cancer screen  06/24/2021    A1C test (Diabetic or Prediabetic)  10/05/2022    Diabetic microalbuminuria test  10/05/2022    Lipid screen  10/05/2022    Potassium monitoring  10/05/2022    Creatinine monitoring  10/05/2022    Colorectal Cancer Screen  02/06/2023    DTaP/Tdap/Td vaccine (2 - Td or Tdap) 10/10/2026    Flu vaccine  Completed    Hepatitis A vaccine  Aged Out    Hib vaccine  Aged Out    Meningococcal (ACWY) vaccine  Aged Out       Subjective:   Review of Systems   Constitutional: Positive for fever. Negative for activity change, appetite change, chills and fatigue. HENT: Negative for congestion, ear pain, rhinorrhea, sinus pressure and sore throat. Eyes: Negative for discharge and itching. Respiratory: Positive for cough. Negative for shortness of breath and wheezing. Cardiovascular: Negative for chest pain. Gastrointestinal: Positive for abdominal pain, diarrhea, nausea and vomiting. Negative for blood in stool and constipation. Musculoskeletal: Negative for myalgias. Skin: Negative for color change and rash. Neurological: Positive for headaches. Negative for dizziness. All other systems reviewed and are negative. Objective    Physical Exam  Vitals and nursing note reviewed. Constitutional:       General: She is not in acute distress. Appearance: Normal appearance. She is obese. HENT:      Head: Normocephalic and atraumatic. Right Ear: Tympanic membrane and ear canal normal.      Left Ear: Tympanic membrane and ear canal normal.      Mouth/Throat:      Mouth: Mucous membranes are moist.      Pharynx: No posterior oropharyngeal erythema. Eyes:      Extraocular Movements: Extraocular movements intact. Pupils: Pupils are equal, round, and reactive to light. Cardiovascular:      Rate and Rhythm: Normal rate and regular rhythm. Pulses: Normal pulses. Heart sounds: Normal heart sounds. No murmur heard. Pulmonary:      Effort: Pulmonary effort is normal. No respiratory distress. Breath sounds: Normal breath sounds. No wheezing. Abdominal:      General: Bowel sounds are normal.      Palpations: Abdomen is soft. Tenderness: There is generalized abdominal tenderness. There is no guarding or rebound. Skin:     General: Skin is warm. Capillary Refill: Capillary refill takes less than 2 seconds. Coloration: Skin is not pale. Findings: No rash. Neurological:      General: No focal deficit present. Mental Status: She is alert and oriented to person, place, and time. Psychiatric:         Attention and Perception: Attention normal.         Mood and Affect: Mood normal.         Behavior: Behavior normal. Behavior is cooperative. Thought Content: Thought content normal.         /82   Pulse 75   Temp 97 °F (36.1 °C)   Resp 14   Ht 5' 5\" (1.651 m)   Wt 272 lb (123.4 kg)   SpO2 98%   BMI 45.26 kg/m²     Assessment         Diagnosis Orders   1. Viral gastroenteritis     2. Sore throat  POCT rapid strep A   3. Encounter for screening for COVID-19  COVID-19       Plan   - Likely at the end of the viral illness due to symptoms resolving.  - Will call with COVID results   - Do not take any medication like immodium to stop diarrhea. It needs to run its course. - Take clear liquids until no more vomiting for 4-6 hours  - Advance to BRAT (bananas, rice, applesauce and toast) as tolerated. - If patient is not improving or developing any new/worsening symptoms then return to clinic as needed or follow up with PCP    Orders Placed This Encounter   Procedures    COVID-19     Scheduling Instructions:      1) Due to current limited availability of the COVID-19 test, tests will be prioritized based on responses to questions above. Testing may be delayed due to volume.             2) Print and instruct patient to adhere to [facility] home isolation program. (Link Above)              3) Set up or refer patient for a monitoring program.              4) Have patient sign up for and leverage [employer] (if not previously done).     Order Specific Question:   Is this test for diagnosis or screening? Answer:   Screening     Order Specific Question:   Symptomatic for COVID-19 as defined by CDC? Answer:   No     Order Specific Question:   Date of Symptom Onset     Answer:   N/A     Order Specific Question:   Hospitalized for COVID-19? Answer:   No     Order Specific Question:   Admitted to ICU for COVID-19? Answer:   No     Order Specific Question:   Employed in healthcare setting? Answer:   Unknown     Order Specific Question:   Resident in a congregate (group) care setting? Answer:   Unknown     Order Specific Question:   Pregnant? Answer:   No     Order Specific Question:   Previously tested for COVID-19? Answer:   Unknown    POCT rapid strep A       Return if symptoms worsen or fail to improve. Discussed use, benefits, and side effects of any prescribed medications. All patient questions were answered. Patient voiced understanding of care plan. Patient was given educational materials - see patient instructions below. Patient Instructions       Patient Education        Gastroenteritis: Care Instructions  Overview     Gastroenteritis is an illness that may cause nausea, vomiting, and diarrhea. Itcan be caused by bacteria or a virus. You will probably begin to feel better in 1 to 2 days. In the meantime, get plenty of rest and make sure you do not become dehydrated. Dehydration occurswhen your body loses too much fluid. Follow-up care is a key part of your treatment and safety. Be sure to make and go to all appointments, and call your doctor if you are having problems. It's also a good idea to know your test results and keep alist of the medicines you take. How can you care for yourself at home?  If your doctor prescribed antibiotics, take them as directed. Do not stop taking them just because you feel better. You need to take the full course of antibiotics.    Drink plenty of fluids to prevent dehydration. Choose water and other clear liquids until you feel better. If you have kidney, heart, or liver disease and have to limit fluids, talk with your doctor before you increase your fluid intake.  Drink fluids slowly, in frequent, small amounts, because drinking too much too fast can cause vomiting.  Begin eating mild foods, such as dry toast, yogurt, applesauce, bananas, and rice. Avoid spicy, hot, or high-fat foods, and do not drink alcohol or caffeine for a day or two. Do not drink milk or eat ice cream until you are feeling better. How to prevent gastroenteritis   Keep hot foods hot and cold foods cold.  Do not eat meats, dressings, salads, or other foods that have been kept at room temperature for more than 2 hours.  Use a thermometer to check your refrigerator. It should be between 34°F and 40°F.   Defrost meats in the refrigerator or microwave, not on the kitchen counter.  Keep your hands and your kitchen clean. Wash your hands, cutting boards, and countertops with hot soapy water frequently.  Cook meat until it is well done.  Do not eat raw eggs or uncooked sauces made with raw eggs.  Do not take chances. If food looks or tastes spoiled, throw it out. When should you call for help? Call 911 anytime you think you may need emergency care. For example, call if:     You vomit blood or what looks like coffee grounds.      You passed out (lost consciousness).      You pass maroon or very bloody stools. Call your doctor now or seek immediate medical care if:     You have severe belly pain.      You have signs of needing more fluids.  You have sunken eyes, a dry mouth, and pass only a little urine.      You feel like you are going to faint.      You have increased belly pain that does not go away in 1 to 2 days.      You have new or increased nausea, or you are vomiting.      You have a new or higher fever.      Your stools are black and tarlike or have streaks of blood. Watch closely for changes in your health, and be sure to contact your doctor if:     You are dizzy or lightheaded.      You urinate less than usual, or your urine is dark yellow or brown.      You do not feel better with each day that goes by. Where can you learn more? Go to https://chpepiceweb.[email or web address]. org and sign in to your [employer] account. Enter N142 in the [employer] box to learn more about \"Gastroenteritis: Care Instructions. \"     If you do not have an account, please click on the \"Sign Up Now\" link. Current as of: July 1, 2021               Content Version: 13.2  © 6867-8445 Healthwise, [employer]. Care instructions adapted under license by [facility] ([facility]). If you have questions about a medical condition or this instruction, always ask your healthcare professional. [place] [address] any warranty or liability for your use of this information.       - If applicable, do not take any medication like immodium to stop diarrhea. It needs to run its course. - Take clear liquids until no more vomiting for 4-6 hours  - Advance to BRAT (bananas, rice, applesauce and toast) as tolerated.    - If patient is not improving or developing any new/worsening symptoms then return to clinic as needed or follow up with PCP            Electronically signed by ANJALI Boyle CNP on 4/14/2022 at 9:08 AM

## 2022-04-14 NOTE — PATIENT INSTRUCTIONS
Patient Education        Gastroenteritis: Care Instructions  Overview     Gastroenteritis is an illness that may cause nausea, vomiting, and diarrhea. Itcan be caused by bacteria or a virus. You will probably begin to feel better in 1 to 2 days. In the meantime, get plenty of rest and make sure you do not become dehydrated. Dehydration occurswhen your body loses too much fluid. Follow-up care is a key part of your treatment and safety. Be sure to make and go to all appointments, and call your doctor if you are having problems. It's also a good idea to know your test results and keep alist of the medicines you take. How can you care for yourself at home?  If your doctor prescribed antibiotics, take them as directed. Do not stop taking them just because you feel better. You need to take the full course of antibiotics.  Drink plenty of fluids to prevent dehydration. Choose water and other clear liquids until you feel better. If you have kidney, heart, or liver disease and have to limit fluids, talk with your doctor before you increase your fluid intake.  Drink fluids slowly, in frequent, small amounts, because drinking too much too fast can cause vomiting.  Begin eating mild foods, such as dry toast, yogurt, applesauce, bananas, and rice. Avoid spicy, hot, or high-fat foods, and do not drink alcohol or caffeine for a day or two. Do not drink milk or eat ice cream until you are feeling better. How to prevent gastroenteritis   Keep hot foods hot and cold foods cold.  Do not eat meats, dressings, salads, or other foods that have been kept at room temperature for more than 2 hours.  Use a thermometer to check your refrigerator. It should be between 34°F and 40°F.   Defrost meats in the refrigerator or microwave, not on the kitchen counter.  Keep your hands and your kitchen clean. Wash your hands, cutting boards, and countertops with hot soapy water frequently.  Cook meat until it is well done.    Do not eat raw eggs or uncooked sauces made with raw eggs.  Do not take chances. If food looks or tastes spoiled, throw it out. When should you call for help? Call 911 anytime you think you may need emergency care. For example, call if:     You vomit blood or what looks like coffee grounds.      You passed out (lost consciousness).      You pass maroon or very bloody stools. Call your doctor now or seek immediate medical care if:     You have severe belly pain.      You have signs of needing more fluids. You have sunken eyes, a dry mouth, and pass only a little urine.      You feel like you are going to faint.      You have increased belly pain that does not go away in 1 to 2 days.      You have new or increased nausea, or you are vomiting.      You have a new or higher fever.      Your stools are black and tarlike or have streaks of blood. Watch closely for changes in your health, and be sure to contact your doctor if:     You are dizzy or lightheaded.      You urinate less than usual, or your urine is dark yellow or brown.      You do not feel better with each day that goes by. Where can you learn more? Go to [email or web address]://[email or web address]. org and sign in to your Pharnext account. Enter N142 in the [facility] box to learn more about \"Gastroenteritis: Care Instructions. \"     If you do not have an account, please click on the \"Sign Up Now\" link. Current as of: July 1, 2021               Content Version: 13.2  © 2006-2022 "[employer]. Care instructions adapted under license by [facility] ([facility]). If you have questions about a medical condition or this instruction, always ask your healthcare professional. [place] [address] any warranty or liability for your use of this information.       - If applicable, do not take any medication like immodium to stop diarrhea. It needs to run its course.    - Take clear liquids until no more vomiting for 4-6 hours  - Advance to BRAT (bananas, rice, applesauce and toast) as tolerated.    - If patient is not improving or developing any new/worsening symptoms then return to clinic as needed or follow up with PCP

## 2022-04-15 DIAGNOSIS — E11.65 TYPE 2 DIABETES MELLITUS WITH HYPERGLYCEMIA, WITHOUT LONG-TERM CURRENT USE OF INSULIN (HCC): ICD-10-CM

## 2022-04-15 DIAGNOSIS — R11.0 NAUSEA: ICD-10-CM

## 2022-04-18 RX ORDER — ONDANSETRON 4 MG/1
TABLET, FILM COATED ORAL
Qty: 60 TABLET | Refills: 0 | Status: SHIPPED | OUTPATIENT
Start: 2022-04-18 | End: 2022-05-16

## 2022-04-28 ENCOUNTER — OFFICE VISIT (OUTPATIENT)
Dept: FAMILY MEDICINE CLINIC | Age: 57
End: 2022-04-28
Payer: MEDICAID

## 2022-04-28 VITALS
SYSTOLIC BLOOD PRESSURE: 130 MMHG | RESPIRATION RATE: 16 BRPM | DIASTOLIC BLOOD PRESSURE: 70 MMHG | TEMPERATURE: 97.1 F | WEIGHT: 271.2 LBS | HEIGHT: 65 IN | OXYGEN SATURATION: 94 % | HEART RATE: 82 BPM | BODY MASS INDEX: 45.18 KG/M2

## 2022-04-28 DIAGNOSIS — E11.65 TYPE 2 DIABETES MELLITUS WITH HYPERGLYCEMIA, WITHOUT LONG-TERM CURRENT USE OF INSULIN (HCC): Primary | ICD-10-CM

## 2022-04-28 DIAGNOSIS — K12.1 ORAL ULCERATION: ICD-10-CM

## 2022-04-28 DIAGNOSIS — F33.1 MODERATE EPISODE OF RECURRENT MAJOR DEPRESSIVE DISORDER (HCC): ICD-10-CM

## 2022-04-28 DIAGNOSIS — M19.90 ARTHRITIS: ICD-10-CM

## 2022-04-28 DIAGNOSIS — I10 ESSENTIAL HYPERTENSION: ICD-10-CM

## 2022-04-28 DIAGNOSIS — E78.5 HYPERLIPIDEMIA, UNSPECIFIED HYPERLIPIDEMIA TYPE: ICD-10-CM

## 2022-04-28 LAB — HBA1C MFR BLD: 6.3 %

## 2022-04-28 PROCEDURE — 99214 OFFICE O/P EST MOD 30 MIN: CPT | Performed by: FAMILY MEDICINE

## 2022-04-28 PROCEDURE — 83036 HEMOGLOBIN GLYCOSYLATED A1C: CPT | Performed by: FAMILY MEDICINE

## 2022-04-28 PROCEDURE — 3044F HG A1C LEVEL LT 7.0%: CPT | Performed by: FAMILY MEDICINE

## 2022-04-28 RX ORDER — ATORVASTATIN CALCIUM 40 MG/1
40 TABLET, FILM COATED ORAL DAILY
Qty: 90 TABLET | Refills: 1 | Status: SHIPPED | OUTPATIENT
Start: 2022-04-28

## 2022-04-28 RX ORDER — FLUOXETINE HYDROCHLORIDE 20 MG/1
20 CAPSULE ORAL DAILY
Qty: 30 CAPSULE | Refills: 3 | Status: SHIPPED | OUTPATIENT
Start: 2022-04-28 | End: 2022-06-16 | Stop reason: SDUPTHER

## 2022-04-28 RX ORDER — LISINOPRIL 20 MG/1
20 TABLET ORAL DAILY
Qty: 90 TABLET | Refills: 1 | Status: SHIPPED | OUTPATIENT
Start: 2022-04-28 | End: 2022-10-03

## 2022-04-28 RX ORDER — VALACYCLOVIR HYDROCHLORIDE 1 G/1
1000 TABLET, FILM COATED ORAL 3 TIMES DAILY
Qty: 21 TABLET | Refills: 0 | Status: SHIPPED | OUTPATIENT
Start: 2022-04-28 | End: 2022-07-22 | Stop reason: SDUPTHER

## 2022-04-28 RX ORDER — MELOXICAM 15 MG/1
15 TABLET ORAL DAILY
Qty: 30 TABLET | Refills: 3 | Status: SHIPPED | OUTPATIENT
Start: 2022-04-28 | End: 2022-06-16 | Stop reason: ALTCHOICE

## 2022-04-28 SDOH — ECONOMIC STABILITY: FOOD INSECURITY: WITHIN THE PAST 12 MONTHS, THE FOOD YOU BOUGHT JUST DIDN'T LAST AND YOU DIDN'T HAVE MONEY TO GET MORE.: NEVER TRUE

## 2022-04-28 SDOH — ECONOMIC STABILITY: FOOD INSECURITY: WITHIN THE PAST 12 MONTHS, YOU WORRIED THAT YOUR FOOD WOULD RUN OUT BEFORE YOU GOT MONEY TO BUY MORE.: NEVER TRUE

## 2022-04-28 ASSESSMENT — PATIENT HEALTH QUESTIONNAIRE - PHQ9
SUM OF ALL RESPONSES TO PHQ QUESTIONS 1-9: 2
SUM OF ALL RESPONSES TO PHQ QUESTIONS 1-9: 2
SUM OF ALL RESPONSES TO PHQ9 QUESTIONS 1 & 2: 2
5. POOR APPETITE OR OVEREATING: 1
SUM OF ALL RESPONSES TO PHQ QUESTIONS 1-9: 2
SUM OF ALL RESPONSES TO PHQ QUESTIONS 1-9: 6
SUM OF ALL RESPONSES TO PHQ QUESTIONS 1-9: 6
1. LITTLE INTEREST OR PLEASURE IN DOING THINGS: 1
4. FEELING TIRED OR HAVING LITTLE ENERGY: 1
SUM OF ALL RESPONSES TO PHQ9 QUESTIONS 1 & 2: 2
SUM OF ALL RESPONSES TO PHQ QUESTIONS 1-9: 6
3. TROUBLE FALLING OR STAYING ASLEEP: 1
9. THOUGHTS THAT YOU WOULD BE BETTER OFF DEAD, OR OF HURTING YOURSELF: 0
1. LITTLE INTEREST OR PLEASURE IN DOING THINGS: 1
10. IF YOU CHECKED OFF ANY PROBLEMS, HOW DIFFICULT HAVE THESE PROBLEMS MADE IT FOR YOU TO DO YOUR WORK, TAKE CARE OF THINGS AT HOME, OR GET ALONG WITH OTHER PEOPLE: 1
6. FEELING BAD ABOUT YOURSELF - OR THAT YOU ARE A FAILURE OR HAVE LET YOURSELF OR YOUR FAMILY DOWN: 0
2. FEELING DOWN, DEPRESSED OR HOPELESS: 1
2. FEELING DOWN, DEPRESSED OR HOPELESS: 1
SUM OF ALL RESPONSES TO PHQ QUESTIONS 1-9: 2
7. TROUBLE CONCENTRATING ON THINGS, SUCH AS READING THE NEWSPAPER OR WATCHING TELEVISION: 1
SUM OF ALL RESPONSES TO PHQ QUESTIONS 1-9: 6
8. MOVING OR SPEAKING SO SLOWLY THAT OTHER PEOPLE COULD HAVE NOTICED. OR THE OPPOSITE, BEING SO FIGETY OR RESTLESS THAT YOU HAVE BEEN MOVING AROUND A LOT MORE THAN USUAL: 0

## 2022-04-28 ASSESSMENT — SOCIAL DETERMINANTS OF HEALTH (SDOH): HOW HARD IS IT FOR YOU TO PAY FOR THE VERY BASICS LIKE FOOD, HOUSING, MEDICAL CARE, AND HEATING?: NOT HARD AT ALL

## 2022-05-13 DIAGNOSIS — R11.0 NAUSEA: ICD-10-CM

## 2022-05-16 DIAGNOSIS — K12.1 ORAL ULCERATION: ICD-10-CM

## 2022-05-16 RX ORDER — ONDANSETRON 4 MG/1
TABLET, FILM COATED ORAL
Qty: 60 TABLET | Refills: 0 | Status: SHIPPED | OUTPATIENT
Start: 2022-05-16 | End: 2022-05-24

## 2022-05-18 DIAGNOSIS — E11.65 TYPE 2 DIABETES MELLITUS WITH HYPERGLYCEMIA, WITHOUT LONG-TERM CURRENT USE OF INSULIN (HCC): ICD-10-CM

## 2022-05-21 ASSESSMENT — ENCOUNTER SYMPTOMS
DIARRHEA: 0
RHINORRHEA: 0
SHORTNESS OF BREATH: 0
VOMITING: 0
NAUSEA: 0
EYE PAIN: 0
EYE DISCHARGE: 0
COUGH: 0
ABDOMINAL PAIN: 0
CONSTIPATION: 0
BACK PAIN: 1

## 2022-05-21 NOTE — PATIENT INSTRUCTIONS
Patient Education        Recovering From Depression: Care Instructions  Your Care Instructions     Taking good care of yourself is important as you recover from depression. In time, your symptoms will fade as your treatment takes hold. Do not give up. Instead, focus your energy on getting better. Your mood will improve. It just takes some time. Focus on things that can help you feel better, such as being with friends and family, eating well, and getting enough rest. But take things slowly. Do not do too much too soon. Youwill begin to feel better gradually. Follow-up care is a key part of your treatment and safety. Be sure to make and go to all appointments, and call your doctor if you are having problems. It's also a good idea to know your test results and keep alist of the medicines you take. How can you care for yourself at home? Be realistic   If you have a large task to do, break it up into smaller steps you can handle, and just do what you can.  You may want to put off important decisions until your depression has lifted. If you have plans that will have a major impact on your life, such as marriage, divorce, or a job change, try to wait a bit. Talk it over with friends and loved ones who can help you look at the overall picture first.   Reaching out to people for help is important. Do not isolate yourself. Let your family and friends help you. Find someone you can trust and confide in, and talk to that person.  Be patient, and be kind to yourself. Remember that depression is not your fault and is not something you can overcome with willpower alone. Treatment is important for depression, just like for any other illness. Feeling better takes time, and your mood will improve little by little. Stay active   Stay busy and get outside. Take a walk, or try some other light exercise.  Talk with your doctor about an exercise program. Exercise can help with mild depression.  Go to a movie or concert. Take part in a Sabianist activity or other social gathering. Go to a MyWave game.  Ask a friend to have dinner with you. Take care of yourself   Eat a balanced diet with plenty of fresh fruits and vegetables, whole grains, and lean protein. If you have lost your appetite, eat small snacks rather than large meals.  Avoid using illegal drugs or marijuana and drinking alcohol. Do not take medicines that have not been prescribed for you. They may interfere with medicines you may be taking for depression, or they may make your depression worse.  Take your medicines exactly as they are prescribed. You may start to feel better within 1 to 3 weeks of taking antidepressant medicine. But it can take as many as 6 to 8 weeks to see more improvement. If you have questions or concerns about your medicines, or if you do not notice any improvement by 3 weeks, talk to your doctor.  Continue to take your medicine after your symptoms improve. Taking your medicine for at least 6 months after you feel better can help keep you from getting depressed again. If this isn't the first time you have been depressed, your doctor may recommend you to take medicine even longer.  If you have any side effects from your medicine, tell your doctor. Many side effects are mild and will go away on their own after you have been taking the medicine for a few weeks. Some may last longer. Talk to your doctor if side effects are bothering you too much. You might be able to try a different medicine.  Continue counseling. It may help prevent depression from returning, especially if you've had multiple episodes of depression. Talk with your counselor if you are having a hard time attending your sessions or you think the sessions aren't working. Don't just stop going.  Get enough sleep. Talk to your doctor if you are having problems sleeping.  Avoid sleeping pills unless they are prescribed by the doctor treating your depression.  Sleeping pills may make you groggy during the day, and they may interact with other medicine you are taking.  If you have any other illnesses, such as diabetes, heart disease, or high blood pressure, make sure to continue with your treatment. Tell your doctor about all of the medicines you take, including those with or without a prescription.  If you or someone you know talks about suicide, self-harm, or feeling hopeless, get help right away. Call the [address] [place] [address] at 1-800-273-talk (5-324.414.6405) or text HOME to 986651 to access the Crisis Text Line. Consider saving these numbers in your phone. When should you call for help? Call 911 anytime you think you may need emergency care. For example, call if:     You feel like hurting yourself or someone else.      Someone you know has depression and is about to attempt or is attempting suicide. Call your doctor now or seek immediate medical care if:     You hear voices.      Someone you know has depression and:  ? Starts to give away his or her possessions. ? Uses illegal drugs or drinks alcohol heavily. ? Talks or writes about death, including writing suicide notes or talking about guns, knives, or pills. ? Starts to spend a lot of time alone. ? Acts very aggressively or suddenly appears calm. Watch closely for changes in your health, and be sure to contact your doctor if:     You do not get better as expected. Where can you learn more? Go to https://[employer].Toxic Attire. org and sign in to your CargoGuard account. Enter H162 in the [facility] box to learn more about \"Recovering From Depression: Care Instructions. \"     If you do not have an account, please click on the \"Sign Up Now\" link. Current as of: June 16, 2021               Content Version: 13.2  © 7542-2057 Healthwise, Incorporated. Care instructions adapted under license by [facility] ([facility]).  If you have questions about a medical condition or this instruction, always ask your healthcare professional. [place] [address] any warranty or liability for your use of this information.

## 2022-05-23 DIAGNOSIS — R11.0 NAUSEA: ICD-10-CM

## 2022-05-24 RX ORDER — ONDANSETRON 4 MG/1
TABLET, FILM COATED ORAL
Qty: 60 TABLET | Refills: 0 | Status: SHIPPED | OUTPATIENT
Start: 2022-05-24 | End: 2022-06-27

## 2022-06-10 DIAGNOSIS — E11.65 TYPE 2 DIABETES MELLITUS WITH HYPERGLYCEMIA, WITHOUT LONG-TERM CURRENT USE OF INSULIN (HCC): ICD-10-CM

## 2022-06-16 ENCOUNTER — OFFICE VISIT (OUTPATIENT)
Dept: FAMILY MEDICINE CLINIC | Age: 57
End: 2022-06-16
Payer: MEDICAID

## 2022-06-16 VITALS
WEIGHT: 267.2 LBS | DIASTOLIC BLOOD PRESSURE: 70 MMHG | HEART RATE: 81 BPM | BODY MASS INDEX: 44.52 KG/M2 | RESPIRATION RATE: 16 BRPM | SYSTOLIC BLOOD PRESSURE: 120 MMHG | OXYGEN SATURATION: 96 % | HEIGHT: 65 IN | TEMPERATURE: 97.3 F

## 2022-06-16 DIAGNOSIS — H66.002 NON-RECURRENT ACUTE SUPPURATIVE OTITIS MEDIA OF LEFT EAR WITHOUT SPONTANEOUS RUPTURE OF TYMPANIC MEMBRANE: ICD-10-CM

## 2022-06-16 DIAGNOSIS — I10 PRIMARY HYPERTENSION: ICD-10-CM

## 2022-06-16 DIAGNOSIS — R53.83 FATIGUE, UNSPECIFIED TYPE: ICD-10-CM

## 2022-06-16 DIAGNOSIS — G25.0 ESSENTIAL TREMOR: ICD-10-CM

## 2022-06-16 DIAGNOSIS — E55.9 VITAMIN D DEFICIENCY: ICD-10-CM

## 2022-06-16 DIAGNOSIS — M19.90 ARTHRITIS: ICD-10-CM

## 2022-06-16 DIAGNOSIS — E78.5 HYPERLIPIDEMIA, UNSPECIFIED HYPERLIPIDEMIA TYPE: ICD-10-CM

## 2022-06-16 DIAGNOSIS — F33.1 MODERATE EPISODE OF RECURRENT MAJOR DEPRESSIVE DISORDER (HCC): ICD-10-CM

## 2022-06-16 DIAGNOSIS — B37.31 VAGINAL YEAST INFECTION: ICD-10-CM

## 2022-06-16 DIAGNOSIS — E11.9 TYPE 2 DIABETES MELLITUS WITHOUT COMPLICATION, WITHOUT LONG-TERM CURRENT USE OF INSULIN (HCC): Primary | ICD-10-CM

## 2022-06-16 LAB
ALBUMIN SERPL-MCNC: 4.1 G/DL (ref 3.5–5.2)
ALP BLD-CCNC: 116 U/L (ref 35–104)
ALT SERPL-CCNC: 16 U/L (ref 5–33)
ANION GAP SERPL CALCULATED.3IONS-SCNC: 12 MMOL/L (ref 7–19)
AST SERPL-CCNC: 18 U/L (ref 5–32)
BASOPHILS ABSOLUTE: 0 K/UL (ref 0–0.2)
BASOPHILS RELATIVE PERCENT: 0.4 % (ref 0–1)
BILIRUB SERPL-MCNC: 0.3 MG/DL (ref 0.2–1.2)
BUN BLDV-MCNC: 13 MG/DL (ref 6–20)
CALCIUM SERPL-MCNC: 9.6 MG/DL (ref 8.6–10)
CHLORIDE BLD-SCNC: 103 MMOL/L (ref 98–111)
CO2: 24 MMOL/L (ref 22–29)
CREAT SERPL-MCNC: 0.6 MG/DL (ref 0.5–0.9)
CREATININE URINE: 139.5 MG/DL (ref 4.2–622)
EOSINOPHILS ABSOLUTE: 0.2 K/UL (ref 0–0.6)
EOSINOPHILS RELATIVE PERCENT: 3.5 % (ref 0–5)
GFR AFRICAN AMERICAN: >59
GFR NON-AFRICAN AMERICAN: >60
GLUCOSE BLD-MCNC: 102 MG/DL (ref 74–109)
HCT VFR BLD CALC: 37.4 % (ref 37–47)
HEMOGLOBIN: 11.6 G/DL (ref 12–16)
IMMATURE GRANULOCYTES #: 0 K/UL
LYMPHOCYTES ABSOLUTE: 1.9 K/UL (ref 1.1–4.5)
LYMPHOCYTES RELATIVE PERCENT: 38.8 % (ref 20–40)
MCH RBC QN AUTO: 27.4 PG (ref 27–31)
MCHC RBC AUTO-ENTMCNC: 31 G/DL (ref 33–37)
MCV RBC AUTO: 88.4 FL (ref 81–99)
MICROALBUMIN UR-MCNC: 1.4 MG/DL (ref 0–19)
MICROALBUMIN/CREAT UR-RTO: 10 MG/G
MONOCYTES ABSOLUTE: 0.5 K/UL (ref 0–0.9)
MONOCYTES RELATIVE PERCENT: 9.3 % (ref 0–10)
NEUTROPHILS ABSOLUTE: 2.4 K/UL (ref 1.5–7.5)
NEUTROPHILS RELATIVE PERCENT: 48 % (ref 50–65)
PDW BLD-RTO: 13.5 % (ref 11.5–14.5)
PLATELET # BLD: 339 K/UL (ref 130–400)
PMV BLD AUTO: 10.1 FL (ref 9.4–12.3)
POTASSIUM SERPL-SCNC: 3.9 MMOL/L (ref 3.5–5)
RBC # BLD: 4.23 M/UL (ref 4.2–5.4)
SODIUM BLD-SCNC: 139 MMOL/L (ref 136–145)
TOTAL PROTEIN: 7.8 G/DL (ref 6.6–8.7)
TSH SERPL DL<=0.05 MIU/L-ACNC: 3.13 UIU/ML (ref 0.27–4.2)
VITAMIN D 25-HYDROXY: 25.6 NG/ML
WBC # BLD: 4.9 K/UL (ref 4.8–10.8)

## 2022-06-16 PROCEDURE — 99214 OFFICE O/P EST MOD 30 MIN: CPT | Performed by: FAMILY MEDICINE

## 2022-06-16 PROCEDURE — 3044F HG A1C LEVEL LT 7.0%: CPT | Performed by: FAMILY MEDICINE

## 2022-06-16 RX ORDER — DICLOFENAC SODIUM 75 MG/1
75 TABLET, DELAYED RELEASE ORAL 2 TIMES DAILY
Qty: 60 TABLET | Refills: 3 | Status: SHIPPED | OUTPATIENT
Start: 2022-06-16

## 2022-06-16 RX ORDER — FLUCONAZOLE 150 MG/1
150 TABLET ORAL
Qty: 2 TABLET | Refills: 0 | Status: SHIPPED | OUTPATIENT
Start: 2022-06-16 | End: 2022-06-22

## 2022-06-16 RX ORDER — FLUOXETINE HYDROCHLORIDE 20 MG/1
20 CAPSULE ORAL DAILY
Qty: 30 CAPSULE | Refills: 3 | Status: SHIPPED | OUTPATIENT
Start: 2022-06-16

## 2022-06-16 RX ORDER — ROPINIROLE 1 MG/1
1 TABLET, FILM COATED ORAL NIGHTLY
Qty: 90 TABLET | Refills: 1 | Status: SHIPPED | OUTPATIENT
Start: 2022-06-16

## 2022-06-16 RX ORDER — AMOXICILLIN AND CLAVULANATE POTASSIUM 875; 125 MG/1; MG/1
1 TABLET, FILM COATED ORAL 2 TIMES DAILY
Qty: 20 TABLET | Refills: 0 | Status: SHIPPED | OUTPATIENT
Start: 2022-06-16 | End: 2022-06-26

## 2022-06-16 NOTE — PROGRESS NOTES
Araceli LOAIZA [facility]  [address] [id number]  Dept: [phone]  Dept Fax: [phone]    Visit type: Established patient    Reason for Visit: Diabetes, Otalgia, and Dizziness         Assessment and Plan       1. Type 2 diabetes mellitus without complication, without long-term current use of insulin ([address]  2. Primary hypertension  3. Hyperlipidemia, unspecified hyperlipidemia type  4. Arthritis  -     diclofenac (VOLTAREN) 75 MG EC tablet; Take 1 tablet by mouth 2 times daily, Disp-60 tablet, R-3Normal  5. Essential tremor  -     rOPINIRole (REQUIP) 1 MG tablet; Take 1 tablet by mouth nightly, Disp-90 tablet, R-1Normal  6. Moderate episode of recurrent major depressive disorder (HCC)  -     FLUoxetine (PROZAC) 20 MG capsule; Take 1 capsule by mouth daily, Disp-30 capsule, R-3Normal  7. Non-recurrent acute suppurative otitis media of left ear without spontaneous rupture of tympanic membrane  -     amoxicillin-clavulanate (AUGMENTIN) 875-125 MG per tablet; Take 1 tablet by mouth 2 times daily for 10 days, Disp-20 tablet, R-0Normal  8. Vaginal yeast infection  -     fluconazole (DIFLUCAN) 150 MG tablet; Take 1 tablet by mouth every 72 hours for 6 days, Disp-2 tablet, R-0Normal  9. Fatigue, unspecified type  -     CBC with Auto Differential; Future  -     Comprehensive Metabolic Panel; Future  -     Microalbumin / Creatinine Urine Ratio; Future  -     TSH; Future  -     Vitamin D 25 Hydroxy; Future  10. Vitamin D deficiency  -     Vitamin D 25 Hydroxy; Future    Discussed diagnosis, expected course, and proper use of medication, including OTC medications if prescription is too expensive or insurance does not cover.   Discussed possibility of getting her set up with a arthritis specialist Clifford Walker which she was originally desiring but also discussed possibility of trial of other medications in efforts to improve her arthritis symptoms and she is electing to pursue that at this time and if we continue to struggle to get her improvement with her arthritis symptoms potentially involving the specialist at that time  With patient doing well with her other medications we will continue at this time continue to monitor and adjust as course dictates. Stressed the importance of being diligent with her diabetic diet, discussed the importance of continued home monitoring of her blood pressure and discussed dietary and lifestyle modifications that may be beneficial.  Discussed signs symptoms requiring medical attention. All questions were answered and patient voiced understanding and agreement with plan as discussed. Return in about 3 months (around 9/16/2022), or if symptoms worsen or fail to improve. Subjective       HPI   Patient has a history of type 2 diabetes and is doing well with her current medication. she denies any issues with the use of the medicine. she denies any new symptoms associated with her diabetes. she is attempting to be diligent with her diet and compliant with her medication. Patient has arterial hypertension that is doing well with her current medication. she denies any issues with use of her  medicine. she denies any symptoms associated with abnormal blood pressures. she is attempting to avoid excess salt intake. Patient has hyperlipidemia and reports that she is tolerating her Lipitor without any new myalgias or GI upsets. she is attempting to watch her diet and trying to stay physically active. She does have issues with arthritis involving her hands and was previously on Mobic but felt like it was not helping. She does have a history of tremors but is doing well with the use of Requip. She does state that she has been having left ear pain and has also previously had some vertigo type symptoms that has resolved but the pain is continued.   She otherwise denies any other associated symptoms denies any fever no sick contacts or      Review of Systems Constitutional: Positive for fatigue. Negative for activity change, appetite change and fever. HENT: Positive for ear pain. Negative for congestion and rhinorrhea. Eyes: Negative for pain and discharge. Respiratory: Negative for cough and shortness of breath. Cardiovascular: Negative for chest pain and palpitations. Gastrointestinal: Negative for abdominal pain, constipation, diarrhea, nausea and vomiting. Endocrine: Negative for cold intolerance and heat intolerance. Genitourinary: Negative for dysuria and hematuria. Musculoskeletal: Positive for arthralgias and back pain. Negative for neck pain. Skin: Negative for rash and wound. Neurological: Negative for syncope and weakness. Hematological: Negative for adenopathy. Does not bruise/bleed easily. Psychiatric/Behavioral: Negative for dysphoric mood and sleep disturbance. The patient is not nervous/anxious. Allergies   Allergen Reactions    Oxycodone-Acetaminophen     Oxycodone-Acetaminophen Nausea Only       Outpatient Medications Prior to Visit   Medication Sig Dispense Refill    metFORMIN (GLUCOPHAGE) 1000 MG tablet TAKE 1 TABLET BY MOUTH TWICE A DAY WITH MEALS 60 tablet 1    ondansetron (ZOFRAN) 4 MG tablet TAKE 1 TABLET BY MOUTH EVERY 8 HOURS AS NEEDED FOR NAUSEA AND VOMITING 60 tablet 0    Magic Mouthwash (MIRACLE MOUTHWASH) Swish and spit 5 ml QID prn 400 mL 0    atorvastatin (LIPITOR) 40 MG tablet Take 1 tablet by mouth daily 90 tablet 1    lisinopril (PRINIVIL;ZESTRIL) 20 MG tablet Take 1 tablet by mouth daily 90 tablet 1    PROAIR  (90 Base) MCG/ACT inhaler TAKE 2 PUFFS BY MOUTH EVERY 4 HOURS AS NEEDED FOR WHEEZE 8.5 each 1    gabapentin (NEURONTIN) 300 MG capsule       HYDROcodone-acetaminophen (NORCO)  MG per tablet 1 TABLET(S) ORAL THREE TIMES A DAY AS NEEDED      Diazepam (VALIUM PO) Take 5 mg by mouth daily.       meloxicam (MOBIC) 15 MG tablet Take 1 tablet by mouth daily 30 tablet 3    FLUoxetine (PROZAC) 20 MG capsule Take 1 capsule by mouth daily 30 capsule 3    rOPINIRole (REQUIP) 0.5 MG tablet TAKE 1 TABLET BY MOUTH EVERY DAY 90 tablet 1     No facility-administered medications prior to visit.         Past Medical History:   Diagnosis Date    Anxiety     Chronic back pain     COPD (chronic obstructive pulmonary disease) (HCC)     Diabetes mellitus (HCC)     hx. of diabetes, no meds    Epilepsy ([address]     GERD (gastroesophageal reflux disease)     History of colon polyps     History of seizures     Hyperlipidemia     Hypertension     Memory difficulty     Neck pain     Obesity     Other malaise and fatigue     Oxygen dependent     at night    Parkinsons disease (HCC)     Restless legs syndrome     SOB (shortness of breath)     Swelling     SWELLING OF HANDS AND FEET    Vision blurred         Social History     Tobacco Use    Smoking status: Former Smoker     Packs/day: 2.50     Years: 30.00     Pack years: 75.00     Types: Cigarettes     Quit date: 2009     Years since quittin.0    Smokeless tobacco: Never Used   Substance Use Topics    Alcohol use: No        Past Surgical History:   Procedure Laterality Date    BREAST SURGERY      BG Cyst    CARPAL TUNNEL RELEASE Right 2019    RIGHT CARPAL TUNNEL RELEASE performed by Jared Brewer MD at [address] [place] [address] Drive  2010    Dr Ania Romero:  HP x 2. 5 yr recall     COLONOSCOPY  08    Dr Ania Romero    COLONOSCOPY  07    Dr Ania Romero    COLONOSCOPY  2015    [place]: normal (5 yr)   [facility] COLONOSCOPY N/A 2018    Dr [facility]rales[facility]--5 yr recall    EYE SURGERY      right cat with implant    HYSTERECTOMY (CERVIX STATUS UNKNOWN)  2003    MD EGD TRANSORAL BIOPSY SINGLE/MULTIPLE N/A 2018    Dr Corrales-w/dilation over wire-48 Portuguese-mild esophagitis/distal esophageal stricture Laura (-)  Esophagogastritis    PRE-MALIGNANT / BENIGN SKIN LESION EXCISION      BG    UPPER GASTROINTESTINAL ENDOSCOPY  7-    Dr Clark Ascencio ENDOSCOPY  8-26-08    Dr Zach Powell  01/30/2013    Dr Maria M Simpson:  possible GERD. Biopsies for Walters's and EoE neg    UPPER GASTROINTESTINAL ENDOSCOPY  4/9/14    Dr Maria M Simpson       Family History   Problem Relation Age of Onset    Stroke Mother     Diabetes Mother     Heart Disease Mother     Stomach Cancer Father     Colon Cancer Father     Colon Polyps Father     Stomach Cancer Sister     Heart Disease Brother     Heart Disease Sister     Diabetes Sister     Colon Polyps Sister         x2    Esophageal Cancer Neg Hx     Liver Cancer Neg Hx     Liver Disease Neg Hx     Rectal Cancer Neg Hx        Objective       /70 (Site: Left Upper Arm, Position: Sitting, Cuff Size: Large Adult)   Pulse 81   Temp 97.3 °F (36.3 °C) (Infrared)   Resp 16   Ht 5' 5\" (1.651 m)   Wt 267 lb 3.2 oz (121.2 kg)   SpO2 96%   BMI 44.46 kg/m²   Physical Exam  Vitals and nursing note reviewed. Constitutional:       General: She is not in acute distress. Appearance: She is well-developed. She is not diaphoretic. HENT:      Head: Normocephalic and atraumatic. Right Ear: Tympanic membrane, ear canal and external ear normal.      Left Ear: Ear canal and external ear normal. Tympanic membrane is erythematous and bulging. Nose: Nose normal.      Mouth/Throat:      Mouth: Mucous membranes are moist.      Pharynx: No oropharyngeal exudate or posterior oropharyngeal erythema. Comments: Vesicular lesions noted in mouth  Eyes:      General: No scleral icterus. Right eye: No discharge. Left eye: No discharge. Conjunctiva/sclera: Conjunctivae normal.   Neck:      Thyroid: No thyromegaly. Trachea: No tracheal deviation. Cardiovascular:      Rate and Rhythm: Normal rate and regular rhythm. Heart sounds: Normal heart sounds. No murmur heard. No friction rub. No gallop. Pulmonary:      Effort: Pulmonary effort is normal. No respiratory distress. Breath sounds: Normal breath sounds. No wheezing or rales. Abdominal:      General: Bowel sounds are normal. There is no distension. Palpations: Abdomen is soft. Tenderness: There is no abdominal tenderness. Musculoskeletal:         General: No deformity (No gross deformities of upper or lower extremities). Cervical back: Neck supple. Right lower leg: No edema. Left lower leg: No edema. Lymphadenopathy:      Cervical: No cervical adenopathy. Skin:     General: Skin is warm and dry. Findings: No erythema or rash. Neurological:      Mental Status: She is alert and oriented to person, place, and time. Cranial Nerves: No cranial nerve deficit. Psychiatric:         Behavior: Behavior normal.         Thought Content:  Thought content normal.           Data Reviewed and Summarized       Labs:     Imaging/Testing:        Zeyad Herrera MD

## 2022-06-27 DIAGNOSIS — R11.0 NAUSEA: ICD-10-CM

## 2022-06-27 RX ORDER — ONDANSETRON 4 MG/1
TABLET, FILM COATED ORAL
Qty: 60 TABLET | Refills: 0 | Status: SHIPPED | OUTPATIENT
Start: 2022-06-27 | End: 2022-07-18

## 2022-07-06 ASSESSMENT — ENCOUNTER SYMPTOMS
RHINORRHEA: 0
SHORTNESS OF BREATH: 0
EYE PAIN: 0
DIARRHEA: 0
BACK PAIN: 1
EYE DISCHARGE: 0
ABDOMINAL PAIN: 0
VOMITING: 0
CONSTIPATION: 0
NAUSEA: 0
COUGH: 0

## 2022-07-07 NOTE — PATIENT INSTRUCTIONS
Patient Education        Ear Infection (Otitis Media): Care Instructions  Overview     An ear infection may start with a cold and affect the middle ear (otitis media). It can hurt a lot. Most ear infections clear up on their own in a couple of days and do not need antibiotics. Also, antibiotics do not work against viruses, which may be the cause of your infection. Regular doses ofpain relievers are the best way to reduce your fever and help you feel better. Follow-up care is a key part of your treatment and safety. Be sure to make and go to all appointments, and call your doctor if you are having problems. It's also a good idea to know your test results and keep alist of the medicines you take. How can you care for yourself at home?  Take pain medicines exactly as directed. ? If the doctor gave you a prescription medicine for pain, take it as prescribed. ? If you are not taking a prescription pain medicine, take an over-the-counter medicine, such as acetaminophen (Tylenol), ibuprofen (Advil, Motrin), or naproxen (Aleve). Read and follow all instructions on the label. ? Do not take two or more pain medicines at the same time unless the doctor told you to. Many pain medicines have acetaminophen, which is Tylenol. Too much acetaminophen (Tylenol) can be harmful.  Plan to take a full dose of pain reliever before bedtime. Getting enough sleep will help you get better.  Try a warm, moist washcloth on the ear. It may help relieve pain.  If your doctor prescribed antibiotics, take them as directed. Do not stop taking them just because you feel better. You need to take the full course of antibiotics. When should you call for help? Call your doctor now or seek immediate medical care if:     You have new or increasing ear pain.      You have new or increasing pus or blood draining from your ear.      You have a fever with a stiff neck or a severe headache.    Watch closely for changes in your health, and be sure to contact your doctor if:     You have new or worse symptoms.      You are not getting better after taking an antibiotic for 2 days. Where can you learn more? Go to https://chpepiceweb.Flare3d. org and sign in to your Data Elite account. Enter Z253 in the [facility] box to learn more about \"Ear Infection (Otitis Media): Care Instructions. \"     If you do not have an account, please click on the \"Sign Up Now\" link. Current as of: September 8, 2021               Content Version: 13.3  © 9182-8110 Healthwise, Incorporated. Care instructions adapted under license by [facility] ([facility]). If you have questions about a medical condition or this instruction, always ask your healthcare professional. [address] any warranty or liability for your use of this information.

## 2022-07-08 DIAGNOSIS — E11.65 TYPE 2 DIABETES MELLITUS WITH HYPERGLYCEMIA, WITHOUT LONG-TERM CURRENT USE OF INSULIN (HCC): ICD-10-CM

## 2022-07-16 ENCOUNTER — TELEPHONE (OUTPATIENT)
Dept: FAMILY MEDICINE CLINIC | Age: 57
End: 2022-07-16

## 2022-07-18 ENCOUNTER — TELEPHONE (OUTPATIENT)
Dept: FAMILY MEDICINE CLINIC | Age: 57
End: 2022-07-18

## 2022-07-18 DIAGNOSIS — R11.0 NAUSEA: ICD-10-CM

## 2022-07-18 DIAGNOSIS — E55.9 VITAMIN D DEFICIENCY: Primary | ICD-10-CM

## 2022-07-18 RX ORDER — ONDANSETRON 4 MG/1
TABLET, FILM COATED ORAL
Qty: 60 TABLET | Refills: 0 | Status: SHIPPED | OUTPATIENT
Start: 2022-07-18 | End: 2022-08-15

## 2022-07-18 RX ORDER — ERGOCALCIFEROL 1.25 MG/1
50000 CAPSULE ORAL WEEKLY
Qty: 12 CAPSULE | Refills: 1 | Status: SHIPPED | OUTPATIENT
Start: 2022-07-18 | End: 2022-10-31

## 2022-07-21 ENCOUNTER — PATIENT MESSAGE (OUTPATIENT)
Dept: FAMILY MEDICINE CLINIC | Age: 57
End: 2022-07-21

## 2022-07-21 DIAGNOSIS — K12.1 ORAL ULCERATION: ICD-10-CM

## 2022-07-22 RX ORDER — VALACYCLOVIR HYDROCHLORIDE 1 G/1
1000 TABLET, FILM COATED ORAL 3 TIMES DAILY
Qty: 21 TABLET | Refills: 0 | Status: SHIPPED | OUTPATIENT
Start: 2022-07-22 | End: 2022-07-29

## 2022-07-22 NOTE — TELEPHONE ENCOUNTER
Janis Alvarez called to request a refill on her medication. Last office visit : 6/16/2022   Next office visit : Visit date not found     Requested Prescriptions     Pending Prescriptions Disp Refills    valACYclovir (VALTREX) 1 g tablet 21 tablet 0     Sig: Take 1 tablet by mouth in the morning and 1 tablet at noon and 1 tablet before bedtime. Do all this for 7 days.             Jesse Greer MA

## 2022-07-22 NOTE — TELEPHONE ENCOUNTER
From: Simi Wood  Sent: 7/22/2022 12:47 AM CDT  [facility]  Subject: Refill on medication     Yes

## 2022-08-10 DIAGNOSIS — E11.65 TYPE 2 DIABETES MELLITUS WITH HYPERGLYCEMIA, WITHOUT LONG-TERM CURRENT USE OF INSULIN (HCC): ICD-10-CM

## 2022-08-12 DIAGNOSIS — R11.0 NAUSEA: ICD-10-CM

## 2022-08-15 RX ORDER — ONDANSETRON 4 MG/1
TABLET, FILM COATED ORAL
Qty: 60 TABLET | Refills: 0 | Status: SHIPPED | OUTPATIENT
Start: 2022-08-15 | End: 2022-08-30

## 2022-08-30 DIAGNOSIS — F33.1 MODERATE EPISODE OF RECURRENT MAJOR DEPRESSIVE DISORDER (HCC): ICD-10-CM

## 2022-08-30 DIAGNOSIS — R11.0 NAUSEA: ICD-10-CM

## 2022-08-30 DIAGNOSIS — G25.0 ESSENTIAL TREMOR: ICD-10-CM

## 2022-08-30 RX ORDER — ROPINIROLE 0.5 MG/1
TABLET, FILM COATED ORAL
Qty: 90 TABLET | Refills: 1 | Status: SHIPPED | OUTPATIENT
Start: 2022-08-30

## 2022-08-30 RX ORDER — ESCITALOPRAM OXALATE 20 MG/1
TABLET ORAL
Qty: 30 TABLET | Refills: 2 | Status: SHIPPED | OUTPATIENT
Start: 2022-08-30

## 2022-08-30 RX ORDER — ONDANSETRON 4 MG/1
TABLET, FILM COATED ORAL
Qty: 60 TABLET | Refills: 0 | Status: SHIPPED | OUTPATIENT
Start: 2022-08-30 | End: 2022-09-27

## 2022-09-08 DIAGNOSIS — E11.65 TYPE 2 DIABETES MELLITUS WITH HYPERGLYCEMIA, WITHOUT LONG-TERM CURRENT USE OF INSULIN (HCC): ICD-10-CM

## 2022-09-15 ENCOUNTER — HOSPITAL ENCOUNTER (OUTPATIENT)
Dept: GENERAL RADIOLOGY | Facility: HOSPITAL | Age: 57
Discharge: HOME | End: 2022-09-15

## 2022-09-15 PROCEDURE — 73610 X-RAY EXAM OF ANKLE: CPT

## 2022-09-27 DIAGNOSIS — R11.0 NAUSEA: ICD-10-CM

## 2022-09-27 RX ORDER — ONDANSETRON 4 MG/1
TABLET, FILM COATED ORAL
Qty: 60 TABLET | Refills: 0 | Status: SHIPPED | OUTPATIENT
Start: 2022-09-27

## 2022-09-30 DIAGNOSIS — I10 ESSENTIAL HYPERTENSION: ICD-10-CM

## 2022-10-03 RX ORDER — LISINOPRIL 20 MG/1
TABLET ORAL
Qty: 90 TABLET | Refills: 1 | Status: SHIPPED | OUTPATIENT
Start: 2022-10-03

## 2022-10-05 DIAGNOSIS — E11.65 TYPE 2 DIABETES MELLITUS WITH HYPERGLYCEMIA, WITHOUT LONG-TERM CURRENT USE OF INSULIN (HCC): ICD-10-CM

## 2022-10-29 DIAGNOSIS — E11.65 TYPE 2 DIABETES MELLITUS WITH HYPERGLYCEMIA, WITHOUT LONG-TERM CURRENT USE OF INSULIN (HCC): ICD-10-CM

## 2022-10-30 DIAGNOSIS — E55.9 VITAMIN D DEFICIENCY: ICD-10-CM

## 2022-10-31 RX ORDER — ERGOCALCIFEROL 1.25 MG/1
CAPSULE ORAL
Qty: 12 CAPSULE | Refills: 1 | Status: SHIPPED | OUTPATIENT
Start: 2022-10-31

## 2022-11-21 DIAGNOSIS — R11.0 NAUSEA: ICD-10-CM

## 2022-11-22 RX ORDER — ONDANSETRON 4 MG/1
TABLET, FILM COATED ORAL
Qty: 60 TABLET | Refills: 0 | Status: SHIPPED | OUTPATIENT
Start: 2022-11-22

## 2022-11-23 DIAGNOSIS — E11.65 TYPE 2 DIABETES MELLITUS WITH HYPERGLYCEMIA, WITHOUT LONG-TERM CURRENT USE OF INSULIN (HCC): ICD-10-CM

## 2022-12-08 ENCOUNTER — OFFICE VISIT (OUTPATIENT)
Dept: FAMILY MEDICINE CLINIC | Age: 57
End: 2022-12-08

## 2022-12-08 VITALS
TEMPERATURE: 98 F | HEART RATE: 89 BPM | DIASTOLIC BLOOD PRESSURE: 84 MMHG | OXYGEN SATURATION: 97 % | SYSTOLIC BLOOD PRESSURE: 126 MMHG | BODY MASS INDEX: 43.49 KG/M2 | HEIGHT: 65 IN | WEIGHT: 261 LBS

## 2022-12-08 DIAGNOSIS — R05.9 COUGH, UNSPECIFIED TYPE: ICD-10-CM

## 2022-12-08 DIAGNOSIS — J20.9 ACUTE BRONCHITIS, UNSPECIFIED ORGANISM: ICD-10-CM

## 2022-12-08 DIAGNOSIS — J44.9 CHRONIC OBSTRUCTIVE PULMONARY DISEASE, UNSPECIFIED COPD TYPE (HCC): ICD-10-CM

## 2022-12-08 DIAGNOSIS — E11.9 TYPE 2 DIABETES MELLITUS WITHOUT COMPLICATION, WITHOUT LONG-TERM CURRENT USE OF INSULIN (HCC): ICD-10-CM

## 2022-12-08 DIAGNOSIS — R11.0 NAUSEA: ICD-10-CM

## 2022-12-08 DIAGNOSIS — J44.1 COPD WITH ACUTE EXACERBATION (HCC): Primary | ICD-10-CM

## 2022-12-08 DIAGNOSIS — Z12.31 ENCOUNTER FOR SCREENING MAMMOGRAM FOR MALIGNANT NEOPLASM OF BREAST: ICD-10-CM

## 2022-12-08 DIAGNOSIS — J98.01 ACUTE BRONCHOSPASM: ICD-10-CM

## 2022-12-08 DIAGNOSIS — G25.0 ESSENTIAL TREMOR: ICD-10-CM

## 2022-12-08 DIAGNOSIS — R06.02 SHORTNESS OF BREATH: ICD-10-CM

## 2022-12-08 LAB — HBA1C MFR BLD: 6 %

## 2022-12-08 RX ORDER — DEXTROMETHORPHAN HYDROBROMIDE AND PROMETHAZINE HYDROCHLORIDE 15; 6.25 MG/5ML; MG/5ML
SYRUP ORAL 4 TIMES DAILY PRN
COMMUNITY
Start: 2022-12-06 | End: 2022-12-14

## 2022-12-08 RX ORDER — BUDESONIDE, GLYCOPYRROLATE, AND FORMOTEROL FUMARATE 160; 9; 4.8 UG/1; UG/1; UG/1
2 AEROSOL, METERED RESPIRATORY (INHALATION) 2 TIMES DAILY
Qty: 1 EACH | Refills: 0 | Status: SHIPPED | OUTPATIENT
Start: 2022-12-08

## 2022-12-08 RX ORDER — PREDNISONE 20 MG/1
40 TABLET ORAL DAILY
Qty: 10 TABLET | Refills: 0 | Status: SHIPPED | OUTPATIENT
Start: 2022-12-08 | End: 2022-12-13

## 2022-12-08 RX ORDER — ONDANSETRON 4 MG/1
TABLET, FILM COATED ORAL
Qty: 60 TABLET | Refills: 0 | Status: SHIPPED | OUTPATIENT
Start: 2022-12-08

## 2022-12-08 RX ORDER — GUAIFENESIN 600 MG/1
1200 TABLET, EXTENDED RELEASE ORAL 2 TIMES DAILY
Qty: 40 TABLET | Refills: 0 | Status: SHIPPED | OUTPATIENT
Start: 2022-12-08 | End: 2022-12-18

## 2022-12-08 RX ORDER — CEFUROXIME AXETIL 250 MG/1
TABLET ORAL 2 TIMES DAILY
COMMUNITY
Start: 2022-12-06 | End: 2022-12-14

## 2022-12-08 RX ORDER — ROPINIROLE 0.5 MG/1
0.5 TABLET, FILM COATED ORAL DAILY
Qty: 90 TABLET | Refills: 1 | Status: SHIPPED | OUTPATIENT
Start: 2022-12-08

## 2022-12-08 NOTE — PROGRESS NOTES
Lindsey LOAIZA [address]  Dept: [phone]  Dept Fax: [phone]    Visit type: Established patient    Reason for Visit: Follow-up (Seen at [facility] ) and Cough (Hard time catching breath after; \"fits\")         Assessment and Plan       1. COPD with acute exacerbation (HCC)  -     predniSONE (DELTASONE) 20 MG tablet; Take 2 tablets by mouth daily for 5 days, Disp-10 tablet, R-0Normal  2. Chronic obstructive pulmonary disease, unspecified [facility] type ([facility]  -     Budeson-Glycopyrrol-Formoterol (BREZTRI AEROSPHERE) 160-9-4.8 MCG/ACT AERO; Inhale 2 puffs into the lungs in the morning and at bedtime, Disp-1 each, R-0Normal  3. Acute bronchospasm  -     predniSONE (DELTASONE) 20 MG tablet; Take 2 tablets by mouth daily for 5 days, Disp-10 tablet, R-0Normal  4. Acute bronchitis, unspecified organism  -     predniSONE (DELTASONE) 20 MG tablet; Take 2 tablets by mouth daily for 5 days, Disp-10 tablet, R-0Normal  5. Cough, unspecified type  -     guaiFENesin (MUCINEX) 600 MG extended release tablet; Take 2 tablets by mouth 2 times daily for 10 days, Disp-40 tablet, R-0Normal  6. Type 2 diabetes mellitus without complication, without long-term current use of insulin ([facility])  -     POCT glycosylated hemoglobin (Hb A1C)  7. Shortness of breath  -     XR CHEST STANDARD (2 VW); Future  8. Encounter for screening mammogram for malignant neoplasm of breast  -     MOUNA DIGITAL SCREEN W OR WO CAD BILATERAL; Future    Reviewed urgent care note and medications provided/prescribed. Discussed diagnosis, expected course, and proper use of medication, including OTC medications if prescription is too expensive or insurance does not cover. Discussed signs and symptoms requiring medical attention. All questions were answered and patient voiced understanding and agreement with plan as discussed. Return if symptoms worsen or fail to improve, for next scheduled follow up with PCP. MOUTH ONE TIME PER WEEK 12 capsule 1    lisinopril (PRINIVIL;ZESTRIL) 20 MG tablet TAKE 1 TABLET BY MOUTH EVERY DAY 90 tablet 1    rOPINIRole (REQUIP) 0.5 MG tablet TAKE 1 TABLET BY MOUTH EVERY DAY 90 tablet 1    escitalopram (LEXAPRO) 20 MG tablet TAKE 1 TABLET BY MOUTH EVERY DAY 30 tablet 2    rOPINIRole (REQUIP) 1 MG tablet Take 1 tablet by mouth nightly 90 tablet 1    diclofenac (VOLTAREN) 75 MG EC tablet Take 1 tablet by mouth 2 times daily 60 tablet 3    FLUoxetine (PROZAC) 20 MG capsule Take 1 capsule by mouth daily 30 capsule 3    Magic Mouthwash (MIRACLE MOUTHWASH) Swish and spit 5 ml QID prn 400 mL 0    atorvastatin (LIPITOR) 40 MG tablet Take 1 tablet by mouth daily 90 tablet 1    gabapentin (NEURONTIN) 300 MG capsule       HYDROcodone-acetaminophen (NORCO)  MG per tablet 1 TABLET(S) ORAL THREE TIMES A DAY AS NEEDED      Diazepam (VALIUM PO) Take 5 mg by mouth daily. No facility-administered medications prior to visit.         Past Medical History:   Diagnosis Date    Anxiety     Chronic back pain     COPD (chronic obstructive pulmonary disease) ([facility])     Diabetes mellitus ([facility])     hx. of diabetes, no meds    Epilepsy ([facility]     GERD (gastroesophageal reflux disease)     History of colon polyps     History of seizures     Hyperlipidemia     Hypertension     Memory difficulty     Neck pain     Obesity     Other malaise and fatigue     Oxygen dependent     at night    Parkinsons disease (HCC)     Restless legs syndrome     SOB (shortness of breath)     Swelling     SWELLING OF HANDS AND FEET    Vision blurred         Social History     Tobacco Use    Smoking status: Former     Packs/day: 2.50     Years: 30.00     Pack years: 75.00     Types: Cigarettes     Quit date: 2009     Years since quittin.4    Smokeless tobacco: Never   Substance Use Topics    Alcohol use: No        Past Surgical History:   Procedure Laterality Date    BREAST SURGERY      BG Cyst    CARPAL TUNNEL RELEASE Right 1/25/2019    RIGHT CARPAL TUNNEL RELEASE performed by Eliud Roque MD at [facility] OR    COLONOSCOPY  07/28/2010    Dr Ricardo Sandoval:  HP x 2. 5 yr recall     COLONOSCOPY  9-2-08    Dr Ricardo Sandoval    COLONOSCOPY  7-27-07    Dr Ricardo Sandoval    COLONOSCOPY  08/04/2015    [place]: normal (5 yr)    COLONOSCOPY N/A 2/6/2018    Dr Corrales-[facility]--5 yr recall    EYE SURGERY      right cat with implant    HYSTERECTOMY (CERVIX STATUS UNKNOWN)  2003    [place] EGD TRANSORAL BIOPSY SINGLE/MULTIPLE N/A 5/1/2018    Dr oCrrales-w/dilation over wire-48 [place]-mild esophagitis/distal esophageal stricture Laura (-)  Esophagogastritis    PRE-MALIGNANT / BENIGN SKIN LESION EXCISION          UPPER GASTROINTESTINAL ENDOSCOPY  7-    Dr Montalvo Greek ENDOSCOPY  8-26-08    Dr Carreno Ort  01/30/2013    Dr Ricardo Sandoval:  possible GERD. Biopsies for Walters's and EoE neg    UPPER GASTROINTESTINAL ENDOSCOPY  4/9/14    Dr Ricardo Sandoval       Family History   Problem Relation Age of Onset    Stroke Mother     Diabetes Mother     Heart Disease Mother     Stomach Cancer Father     Colon Cancer Father     Colon Polyps Father     Stomach Cancer Sister     Heart Disease Brother     Heart Disease Sister     Diabetes Sister     Colon Polyps Sister         x2    Esophageal Cancer Neg Hx     Liver Cancer Neg Hx     Liver Disease Neg Hx     Rectal Cancer Neg Hx        Objective       /84 (Site: Right Upper Arm, Position: Sitting, Cuff Size: Large Adult)   Pulse 89   Temp 98 °F (36.7 °C) (Temporal)   Ht 5' 5\" (1.651 m)   Wt 261 lb (118.4 kg)   SpO2 97%   BMI 43.43 kg/m²   Physical Exam  Vitals and nursing note reviewed. Constitutional:       Appearance: She is well-developed. HENT:      Head: Normocephalic and atraumatic.       Right Ear: Hearing, tympanic membrane, ear canal and external ear normal.      Left Ear: Hearing, tympanic membrane, ear canal and external ear normal.      Nose: Congestion and rhinorrhea present. Mouth/Throat:      Mouth: Mucous membranes are moist.      Pharynx: No oropharyngeal exudate or posterior oropharyngeal erythema. Eyes:      General: No scleral icterus. Right eye: No discharge. Left eye: No discharge. Conjunctiva/sclera: Conjunctivae normal.      Pupils: Pupils are equal, round, and reactive to light. Neck:      Trachea: No tracheal deviation. Cardiovascular:      Rate and Rhythm: Normal rate and regular rhythm. Heart sounds: Normal heart sounds. No murmur heard. No friction rub. No gallop. Pulmonary:      Effort: Pulmonary effort is normal. No respiratory distress. Breath sounds: Wheezing present. No rales. Comments: Upper airway coarse breath sounds. Abdominal:      General: Bowel sounds are normal. There is no distension. Palpations: Abdomen is soft. Tenderness: There is no abdominal tenderness. Musculoskeletal:         General: No deformity ( no gross deformities of upper or lower extremities bilaterally). Normal range of motion. Cervical back: Normal range of motion and neck supple. Right lower leg: No edema. Left lower leg: No edema. Lymphadenopathy:      Cervical: No cervical adenopathy. Skin:     General: Skin is warm and dry. Findings: No erythema or rash. Neurological:      Mental Status: She is alert and oriented to person, place, and time. Cranial Nerves: No cranial nerve deficit. Motor: No abnormal muscle tone. Psychiatric:         Behavior: Behavior normal.         Thought Content:  Thought content normal.         Data Reviewed and Summarized       Labs:     Imaging/Testing:        Saba Glass MD

## 2022-12-08 NOTE — TELEPHONE ENCOUNTER
Last OV 12/8/2022  Next OV 4/6/2023      Requested Prescriptions     Pending Prescriptions Disp Refills    ondansetron (ZOFRAN) 4 MG tablet [Pharmacy Med Name: ONDANSETRON HCL 4 MG TABLET] 60 tablet 0     Sig: TAKE 1 TABLET BY MOUTH EVERY 8 HOURS AS NEEDED FOR NAUSEA AND VOMITING

## 2022-12-10 ASSESSMENT — ENCOUNTER SYMPTOMS
SORE THROAT: 0
NAUSEA: 0
EYE PAIN: 0
SHORTNESS OF BREATH: 1
RHINORRHEA: 1
ABDOMINAL PAIN: 0
EYE DISCHARGE: 0
DIARRHEA: 0
COUGH: 1
VOMITING: 0
CONSTIPATION: 0

## 2022-12-12 DIAGNOSIS — G25.0 ESSENTIAL TREMOR: ICD-10-CM

## 2022-12-12 RX ORDER — ROPINIROLE 1 MG/1
1 TABLET, FILM COATED ORAL NIGHTLY
Qty: 90 TABLET | Refills: 1 | OUTPATIENT
Start: 2022-12-12

## 2022-12-15 ENCOUNTER — HOSPITAL ENCOUNTER (OUTPATIENT)
Dept: GENERAL RADIOLOGY | Facility: HOSPITAL | Age: 57
Discharge: HOME | End: 2022-12-15
Admitting: FAMILY MEDICINE

## 2022-12-15 ENCOUNTER — TRANSCRIBE ORDERS (OUTPATIENT)
Dept: ADMINISTRATIVE | Facility: HOSPITAL | Age: 57
End: 2022-12-15

## 2022-12-15 DIAGNOSIS — R06.02 SHORTNESS OF BREATH: Primary | ICD-10-CM

## 2022-12-15 DIAGNOSIS — R06.02 SHORTNESS OF BREATH: ICD-10-CM

## 2022-12-15 PROCEDURE — 71046 X-RAY EXAM CHEST 2 VIEWS: CPT

## 2022-12-21 DIAGNOSIS — G25.0 ESSENTIAL TREMOR: ICD-10-CM

## 2022-12-22 RX ORDER — ROPINIROLE 1 MG/1
1 TABLET, FILM COATED ORAL NIGHTLY
Qty: 90 TABLET | Refills: 1 | OUTPATIENT
Start: 2022-12-22

## 2022-12-23 DIAGNOSIS — E11.65 TYPE 2 DIABETES MELLITUS WITH HYPERGLYCEMIA, WITHOUT LONG-TERM CURRENT USE OF INSULIN (HCC): ICD-10-CM

## 2022-12-23 DIAGNOSIS — J20.9 ACUTE BRONCHITIS, UNSPECIFIED ORGANISM: ICD-10-CM

## 2022-12-23 DIAGNOSIS — J98.01 ACUTE BRONCHOSPASM: ICD-10-CM

## 2022-12-23 DIAGNOSIS — R05.9 COUGH, UNSPECIFIED TYPE: ICD-10-CM

## 2022-12-23 DIAGNOSIS — J44.1 COPD WITH ACUTE EXACERBATION (HCC): ICD-10-CM

## 2022-12-27 DIAGNOSIS — G25.0 ESSENTIAL TREMOR: ICD-10-CM

## 2022-12-27 RX ORDER — GUAIFENESIN 600 MG/1
1200 TABLET, EXTENDED RELEASE ORAL 2 TIMES DAILY
Qty: 40 TABLET | Refills: 0 | Status: SHIPPED | OUTPATIENT
Start: 2022-12-27 | End: 2023-01-06

## 2022-12-27 RX ORDER — ROPINIROLE 1 MG/1
1 TABLET, FILM COATED ORAL NIGHTLY
Qty: 90 TABLET | Refills: 1 | Status: SHIPPED | OUTPATIENT
Start: 2022-12-27

## 2022-12-27 RX ORDER — PREDNISONE 20 MG/1
TABLET ORAL
Qty: 10 TABLET | Refills: 0 | Status: SHIPPED | OUTPATIENT
Start: 2022-12-27

## 2023-01-13 DIAGNOSIS — R11.0 NAUSEA: ICD-10-CM

## 2023-01-14 RX ORDER — ONDANSETRON 4 MG/1
TABLET, FILM COATED ORAL
Qty: 60 TABLET | Refills: 0 | Status: SHIPPED | OUTPATIENT
Start: 2023-01-14

## 2023-01-23 DIAGNOSIS — E78.5 HYPERLIPIDEMIA, UNSPECIFIED HYPERLIPIDEMIA TYPE: ICD-10-CM

## 2023-01-23 RX ORDER — ATORVASTATIN CALCIUM 40 MG/1
TABLET, FILM COATED ORAL
Qty: 90 TABLET | Refills: 1 | Status: SHIPPED | OUTPATIENT
Start: 2023-01-23

## 2023-01-23 NOTE — TELEPHONE ENCOUNTER
Last OV 12/8/2022  Next OV 4/6/2023      Requested Prescriptions     Pending Prescriptions Disp Refills    atorvastatin (LIPITOR) 40 MG tablet [Pharmacy Med Name: ATORVASTATIN 40 MG TABLET] 90 tablet 1     Sig: TAKE 1 TABLET BY MOUTH EVERY DAY

## 2023-01-28 DIAGNOSIS — R11.0 NAUSEA: ICD-10-CM

## 2023-01-30 RX ORDER — ONDANSETRON 4 MG/1
TABLET, FILM COATED ORAL
Qty: 60 TABLET | Refills: 0 | Status: SHIPPED | OUTPATIENT
Start: 2023-01-30

## 2023-02-13 RX ORDER — ALBUTEROL SULFATE 90 UG/1
AEROSOL, METERED RESPIRATORY (INHALATION)
Qty: 18 EACH | Refills: 1 | Status: SHIPPED | OUTPATIENT
Start: 2023-02-13

## 2023-03-16 ENCOUNTER — OFFICE VISIT (OUTPATIENT)
Dept: FAMILY MEDICINE CLINIC | Age: 58
End: 2023-03-16

## 2023-03-16 VITALS
TEMPERATURE: 97.9 F | SYSTOLIC BLOOD PRESSURE: 126 MMHG | HEART RATE: 106 BPM | OXYGEN SATURATION: 97 % | DIASTOLIC BLOOD PRESSURE: 84 MMHG | RESPIRATION RATE: 17 BRPM | BODY MASS INDEX: 45.1 KG/M2 | WEIGHT: 271 LBS

## 2023-03-16 DIAGNOSIS — B96.89 BACTERIAL UPPER RESPIRATORY INFECTION: Primary | ICD-10-CM

## 2023-03-16 DIAGNOSIS — J06.9 BACTERIAL UPPER RESPIRATORY INFECTION: Primary | ICD-10-CM

## 2023-03-16 RX ORDER — DOXYCYCLINE HYCLATE 100 MG
100 TABLET ORAL 2 TIMES DAILY
Qty: 14 TABLET | Refills: 0 | Status: SHIPPED | OUTPATIENT
Start: 2023-03-16 | End: 2023-03-23

## 2023-03-16 RX ORDER — PREDNISONE 20 MG/1
20 TABLET ORAL 2 TIMES DAILY
Qty: 10 TABLET | Refills: 0 | Status: SHIPPED | OUTPATIENT
Start: 2023-03-16 | End: 2023-03-21

## 2023-03-16 RX ORDER — DEXTROMETHORPHAN HYDROBROMIDE AND PROMETHAZINE HYDROCHLORIDE 15; 6.25 MG/5ML; MG/5ML
5 SYRUP ORAL 4 TIMES DAILY PRN
Qty: 140 ML | Refills: 0 | Status: SHIPPED | OUTPATIENT
Start: 2023-03-16 | End: 2023-03-23

## 2023-03-16 ASSESSMENT — PATIENT HEALTH QUESTIONNAIRE - PHQ9
6. FEELING BAD ABOUT YOURSELF - OR THAT YOU ARE A FAILURE OR HAVE LET YOURSELF OR YOUR FAMILY DOWN: 3
9. THOUGHTS THAT YOU WOULD BE BETTER OFF DEAD, OR OF HURTING YOURSELF: 0
1. LITTLE INTEREST OR PLEASURE IN DOING THINGS: 0
4. FEELING TIRED OR HAVING LITTLE ENERGY: 3
10. IF YOU CHECKED OFF ANY PROBLEMS, HOW DIFFICULT HAVE THESE PROBLEMS MADE IT FOR YOU TO DO YOUR WORK, TAKE CARE OF THINGS AT HOME, OR GET ALONG WITH OTHER PEOPLE: 3
SUM OF ALL RESPONSES TO PHQ9 QUESTIONS 1 & 2: 3
SUM OF ALL RESPONSES TO PHQ QUESTIONS 1-9: 12
SUM OF ALL RESPONSES TO PHQ QUESTIONS 1-9: 12
2. FEELING DOWN, DEPRESSED OR HOPELESS: 3
5. POOR APPETITE OR OVEREATING: 0
8. MOVING OR SPEAKING SO SLOWLY THAT OTHER PEOPLE COULD HAVE NOTICED. OR THE OPPOSITE, BEING SO FIGETY OR RESTLESS THAT YOU HAVE BEEN MOVING AROUND A LOT MORE THAN USUAL: 0
7. TROUBLE CONCENTRATING ON THINGS, SUCH AS READING THE NEWSPAPER OR WATCHING TELEVISION: 3
SUM OF ALL RESPONSES TO PHQ QUESTIONS 1-9: 12
3. TROUBLE FALLING OR STAYING ASLEEP: 0
SUM OF ALL RESPONSES TO PHQ QUESTIONS 1-9: 12

## 2023-03-16 ASSESSMENT — ENCOUNTER SYMPTOMS
WHEEZING: 0
COUGH: 1
EYE DISCHARGE: 0
SHORTNESS OF BREATH: 1
SORE THROAT: 0
SINUS PRESSURE: 0
FACIAL SWELLING: 0
EYE PAIN: 0
RHINORRHEA: 0
NAUSEA: 0
CHEST TIGHTNESS: 1
VOMITING: 0

## 2023-03-16 NOTE — PROGRESS NOTES
SUBJECTIVE:  Lon Pacheco is a 59 y.o. who presents today for Fever (Got steroid shot and OTC flu for cough at walk in about a week or so ago), Chills, Dizziness (/), and URI (Recurrent, Neg covid)      HPI    Ms Raymond Arias presents today with persistent symptoms. She was evaluated in UC last week after having acute fever, chills and cough. Negative flu and COVID testing. Discharged after a steroid shot and Coricidin HBP instructions. She has been taking the coricidin but is not feeling better. No longer with fever, but having chills. She relates fatigue, poor appetite, cough and thick sputum. Using her albuterol inhaler every 4 hours. Underlying COPD with shortness of breath and chest tightness. No  symptoms. Has some upper abdominal soreness and neck soreness from coughing.        Past Medical History:   Diagnosis Date    Anxiety     Chronic back pain     COPD (chronic obstructive pulmonary disease) (HCC)     Diabetes mellitus (HCC)     hx. of diabetes, no meds    Epilepsy ([facility]     GERD (gastroesophageal reflux disease)     History of colon polyps     History of seizures     Hyperlipidemia     Hypertension     Memory difficulty     Neck pain     Obesity     Other malaise and fatigue     Oxygen dependent     at night    Parkinsons disease (HCC)     Restless legs syndrome     SOB (shortness of breath)     Swelling     SWELLING OF HANDS AND FEET    Vision blurred      Past Surgical History:   Procedure Laterality Date    BREAST SURGERY  2013    BG Cyst    CARPAL TUNNEL RELEASE Right 1/25/2019    RIGHT CARPAL TUNNEL RELEASE performed by Terrell Whelan MD at [address] [facility] OR    COLONOSCOPY  07/28/2010    Dr Alton Duvall:  HP x 2. 5 yr recall     COLONOSCOPY  9-2-08    Dr Alton Duvall    COLONOSCOPY  7-27-07    Dr Alton Duvall    COLONOSCOPY  08/04/2015    [place]: normal (5 yr)    COLONOSCOPY N/A 2/6/2018    Dr Corrales-[facility]--5 yr recall    EYE SURGERY      right cat with implant    HYSTERECTOMY ([facility]) 2003    [facility] TRANSORAL BIOPSY SINGLE/MULTIPLE N/A 2018    Dr Corrales-w/dilation over wire-48 [place]-mild esophagitis/distal esophageal stricture Laura (-)  Esophagogastritis    PRE-MALIGNANT / BENIGN SKIN LESION EXCISION      BG    UPPER GASTROINTESTINAL ENDOSCOPY  2010    Dr Paredes    UPPER GASTROINTESTINAL ENDOSCOPY  08    Dr Paredes    UPPER GASTROINTESTINAL ENDOSCOPY  2013    Dr Paredes:  possible GERD. Biopsies for Walters's and EoE neg    UPPER GASTROINTESTINAL ENDOSCOPY  14    Dr Paredes     Family History   Problem Relation Age of Onset    Stroke Mother     Diabetes Mother     Heart Disease Mother     Stomach Cancer Father     Colon Cancer Father     Colon Polyps Father     Stomach Cancer Sister     Heart Disease Brother     Heart Disease Sister     Diabetes Sister     Colon Polyps Sister         x2    Esophageal Cancer Neg Hx     Liver Cancer Neg Hx     Liver Disease Neg Hx     Rectal Cancer Neg Hx      Social History     Tobacco Use    Smoking status: Former     Packs/day: 2.50     Years: 30.00     Pack years: 75.00     Types: Cigarettes     Quit date: 2009     Years since quittin.7    Smokeless tobacco: Never   Substance Use Topics    Alcohol use: No     Current Outpatient Medications   Medication Sig Dispense Refill    promethazine-dextromethorphan (PROMETHAZINE-DM) 6.25-15 MG/5ML syrup Take 5 mLs by mouth 4 times daily as needed for Cough 140 mL 0    predniSONE (DELTASONE) 20 MG tablet Take 1 tablet by mouth 2 times daily for 5 days 10 tablet 0    doxycycline hyclate (VIBRA-TABS) 100 MG tablet Take 1 tablet by mouth 2 times daily for 7 days 14 tablet 0    albuterol sulfate HFA (PROVENTIL;VENTOLIN;PROAIR) 108 (90 Base) MCG/ACT inhaler INHALE 2 PUFFS BY MOUTH EVERY 4 HOURS AS NEEDED FOR WHEEZING 18 each 1    ondansetron (ZOFRAN) 4 MG tablet TAKE 1 TABLET BY MOUTH EVERY 8 HOURS AS NEEDED FOR NAUSEA AND VOMITING 60 tablet 0    atorvastatin (LIPITOR) 40 MG tablet  TAKE 1 TABLET BY MOUTH EVERY DAY 90 tablet 1    metFORMIN (GLUCOPHAGE) 1000 MG tablet TAKE 1 TABLET BY MOUTH TWICE A DAY WITH MEALS 60 tablet 1    rOPINIRole (REQUIP) 1 MG tablet TAKE 1 TABLET BY MOUTH NIGHTLY 90 tablet 1    rOPINIRole (REQUIP) 0.5 MG tablet Take 1 tablet by mouth daily 90 tablet 1    Budeson-Glycopyrrol-Formoterol (BREZTRI AEROSPHERE) 160-9-4.8 MCG/ACT AERO Inhale 2 puffs into the lungs in the morning and at bedtime 1 each 0    vitamin D (ERGOCALCIFEROL) 1.25 MG (83033 UT) CAPS capsule TAKE 1 CAPSULE BY MOUTH ONE TIME PER WEEK 12 capsule 1    lisinopril (PRINIVIL;ZESTRIL) 20 MG tablet TAKE 1 TABLET BY MOUTH EVERY DAY 90 tablet 1    Magic Mouthwash (MIRACLE MOUTHWASH) Swish and spit 5 ml QID prn 400 mL 0    gabapentin (NEURONTIN) 300 MG capsule       HYDROcodone-acetaminophen (NORCO)  MG per tablet 1 TABLET(S) ORAL THREE TIMES A DAY AS NEEDED      Diazepam (VALIUM PO) Take 5 mg by mouth daily.       No current facility-administered medications for this visit.     Allergies   Allergen Reactions    Oxycodone-Acetaminophen     Oxycodone-Acetaminophen Nausea Only       Review of Systems   Constitutional:  Positive for activity change, appetite change, chills and fatigue. Negative for fever.   HENT:  Negative for congestion, ear discharge, ear pain, facial swelling, hearing loss, postnasal drip, rhinorrhea, sinus pressure and sore throat.    Eyes:  Negative for pain, discharge and visual disturbance.   Respiratory:  Positive for cough, chest tightness and shortness of breath. Negative for wheezing.    Cardiovascular:  Negative for chest pain.   Gastrointestinal:  Negative for nausea and vomiting.   Genitourinary:  Negative for dysuria, frequency and urgency.   Musculoskeletal:  Positive for arthralgias and myalgias.   Neurological:  Negative for weakness, light-headedness and headaches.      OBJECTIVE:  /84   Pulse (!) 106   Temp 97.9 °F (36.6 °C) (Temporal)   Resp 17   Wt 271 lb (122.9  kg)   SpO2 97%   BMI 45.10 kg/m²    Physical Exam  Vitals reviewed. Constitutional:       General: She is not in acute distress. Appearance: She is well-developed. She is obese. She is not ill-appearing or toxic-appearing. HENT:      Head: Normocephalic and atraumatic. Right Ear: Tympanic membrane and ear canal normal.      Left Ear: Tympanic membrane and ear canal normal.      Mouth/Throat:      Pharynx: No oropharyngeal exudate or posterior oropharyngeal erythema. Eyes:      General:         Right eye: No discharge. Left eye: No discharge. Conjunctiva/sclera: Conjunctivae normal.      Pupils: Pupils are equal, round, and reactive to light. Cardiovascular:      Rate and Rhythm: Normal rate and regular rhythm. Pulmonary:      Effort: Pulmonary effort is normal. No respiratory distress. Breath sounds: Normal breath sounds. No wheezing or rales. Musculoskeletal:         General: Normal range of motion. Cervical back: Neck supple. Lymphadenopathy:      Cervical: Cervical adenopathy present. Skin:     General: Skin is warm and dry. Findings: No rash. Neurological:      Mental Status: She is alert and oriented to person, place, and time. ASSESSMENT/PLAN:  1. Bacterial upper respiratory infection  Acute, persistent  Exam overall negative other than cervical lymphadenopathy  - promethazine-dextromethorphan (PROMETHAZINE-DM) 6.25-15 MG/5ML syrup; Take 5 mLs by mouth 4 times daily as needed for Cough  Dispense: 140 mL; Refill: 0  - predniSONE (DELTASONE) 20 MG tablet; Take 1 tablet by mouth 2 times daily for 5 days  Dispense: 10 tablet; Refill: 0  - doxycycline hyclate (VIBRA-TABS) 100 MG tablet; Take 1 tablet by mouth 2 times daily for 7 days  Dispense: 14 tablet; Refill: 0        Return in about 3 months (around 6/16/2023).

## 2023-03-28 DIAGNOSIS — I10 ESSENTIAL HYPERTENSION: ICD-10-CM

## 2023-03-28 DIAGNOSIS — E55.9 VITAMIN D DEFICIENCY: ICD-10-CM

## 2023-03-28 DIAGNOSIS — R11.0 NAUSEA: ICD-10-CM

## 2023-03-29 RX ORDER — ONDANSETRON 4 MG/1
TABLET, FILM COATED ORAL
Qty: 60 TABLET | Refills: 0 | Status: SHIPPED | OUTPATIENT
Start: 2023-03-29

## 2023-03-29 RX ORDER — ERGOCALCIFEROL 1.25 MG/1
CAPSULE ORAL
Qty: 12 CAPSULE | Refills: 1 | Status: SHIPPED | OUTPATIENT
Start: 2023-03-29

## 2023-03-29 RX ORDER — LISINOPRIL 20 MG/1
TABLET ORAL
Qty: 90 TABLET | Refills: 1 | Status: SHIPPED | OUTPATIENT
Start: 2023-03-29

## 2023-03-30 RX ORDER — ALBUTEROL SULFATE 90 UG/1
AEROSOL, METERED RESPIRATORY (INHALATION)
Qty: 18 EACH | Refills: 1 | OUTPATIENT
Start: 2023-03-30

## 2023-04-19 DIAGNOSIS — E11.65 TYPE 2 DIABETES MELLITUS WITH HYPERGLYCEMIA, WITHOUT LONG-TERM CURRENT USE OF INSULIN (HCC): ICD-10-CM

## 2023-04-28 DIAGNOSIS — G25.0 ESSENTIAL TREMOR: ICD-10-CM

## 2023-04-28 DIAGNOSIS — R11.0 NAUSEA: ICD-10-CM

## 2023-05-01 RX ORDER — ROPINIROLE 1 MG/1
1 TABLET, FILM COATED ORAL NIGHTLY
Qty: 90 TABLET | Refills: 1 | Status: SHIPPED | OUTPATIENT
Start: 2023-05-01

## 2023-05-01 RX ORDER — ALBUTEROL SULFATE 90 UG/1
AEROSOL, METERED RESPIRATORY (INHALATION)
Qty: 18 EACH | Refills: 1 | Status: SHIPPED | OUTPATIENT
Start: 2023-05-01

## 2023-05-01 RX ORDER — ONDANSETRON 4 MG/1
TABLET, FILM COATED ORAL
Qty: 60 TABLET | Refills: 0 | Status: SHIPPED | OUTPATIENT
Start: 2023-05-01

## 2023-05-01 NOTE — TELEPHONE ENCOUNTER
Last OV 3/16/2023  Next OV 6/15/2023      Requested Prescriptions     Pending Prescriptions Disp Refills    VENTOLIN  (90 Base) MCG/ACT inhaler [Pharmacy Med Name: VENTOLIN HFA 90 MCG INHALER] 18 each 1     Sig: TAKE 2 PUFFS BY MOUTH EVERY 4 HOURS AS NEEDED FOR WHEEZE    ondansetron (ZOFRAN) 4 MG tablet [Pharmacy Med Name: ONDANSETRON HCL 4 MG TABLET] 60 tablet 0     Sig: TAKE 1 TABLET BY MOUTH EVERY 8 HOURS AS NEEDED FOR NAUSEA AND VOMITING    rOPINIRole (REQUIP) 1 MG tablet [Pharmacy Med Name: ROPINIROLE HCL 1 MG TABLET] 90 tablet 1     Sig: TAKE 1 TABLET BY MOUTH NIGHTLY

## 2023-05-05 DIAGNOSIS — E78.5 HYPERLIPIDEMIA, UNSPECIFIED HYPERLIPIDEMIA TYPE: ICD-10-CM

## 2023-05-08 RX ORDER — ATORVASTATIN CALCIUM 40 MG/1
TABLET, FILM COATED ORAL
Qty: 90 TABLET | Refills: 1 | Status: SHIPPED | OUTPATIENT
Start: 2023-05-08

## 2023-05-16 DIAGNOSIS — R11.0 NAUSEA: ICD-10-CM

## 2023-05-17 RX ORDER — ONDANSETRON 4 MG/1
TABLET, FILM COATED ORAL
Qty: 60 TABLET | Refills: 0 | Status: SHIPPED | OUTPATIENT
Start: 2023-05-17

## 2023-05-22 DIAGNOSIS — E11.65 TYPE 2 DIABETES MELLITUS WITH HYPERGLYCEMIA, WITHOUT LONG-TERM CURRENT USE OF INSULIN (HCC): ICD-10-CM

## 2023-05-22 NOTE — TELEPHONE ENCOUNTER
Last OV 3/16/2023  Next OV 6/15/2023      Requested Prescriptions     Pending Prescriptions Disp Refills    metFORMIN (GLUCOPHAGE) 1000 MG tablet 60 tablet 1     Sig: Take 1 tablet by mouth 2 times daily (with meals)

## 2023-05-26 DIAGNOSIS — R11.0 NAUSEA: ICD-10-CM

## 2023-05-26 RX ORDER — ONDANSETRON 4 MG/1
TABLET, FILM COATED ORAL
Qty: 15 TABLET | Refills: 0 | Status: SHIPPED | OUTPATIENT
Start: 2023-05-26 | End: 2023-07-19

## 2023-06-15 DIAGNOSIS — J30.9 ALLERGIC RHINITIS, UNSPECIFIED SEASONALITY, UNSPECIFIED TRIGGER: ICD-10-CM

## 2023-06-15 DIAGNOSIS — E11.65 TYPE 2 DIABETES MELLITUS WITH HYPERGLYCEMIA, WITHOUT LONG-TERM CURRENT USE OF INSULIN (HCC): ICD-10-CM

## 2023-06-15 DIAGNOSIS — E78.5 HYPERLIPIDEMIA, UNSPECIFIED HYPERLIPIDEMIA TYPE: ICD-10-CM

## 2023-06-15 LAB
ALBUMIN SERPL-MCNC: 4.3 G/DL (ref 3.5–5.2)
ALP SERPL-CCNC: 113 U/L (ref 35–104)
ALT SERPL-CCNC: 18 U/L (ref 5–33)
ANION GAP SERPL CALCULATED.3IONS-SCNC: 8 MMOL/L (ref 7–19)
AST SERPL-CCNC: 15 U/L (ref 5–32)
BASOPHILS # BLD: 0.1 K/UL (ref 0–0.2)
BASOPHILS NFR BLD: 0.9 % (ref 0–1)
BILIRUB SERPL-MCNC: <0.2 MG/DL (ref 0.2–1.2)
BUN SERPL-MCNC: 9 MG/DL (ref 6–20)
CALCIUM SERPL-MCNC: 9.9 MG/DL (ref 8.6–10)
CHLORIDE SERPL-SCNC: 105 MMOL/L (ref 98–111)
CHOLEST SERPL-MCNC: 131 MG/DL (ref 160–199)
CO2 SERPL-SCNC: 28 MMOL/L (ref 22–29)
CREAT SERPL-MCNC: 0.6 MG/DL (ref 0.5–0.9)
CREAT UR-MCNC: 15.8 MG/DL (ref 28–217)
EOSINOPHIL # BLD: 0.2 K/UL (ref 0–0.6)
EOSINOPHIL NFR BLD: 3.7 % (ref 0–5)
ERYTHROCYTE [DISTWIDTH] IN BLOOD BY AUTOMATED COUNT: 13 % (ref 11.5–14.5)
GLUCOSE SERPL-MCNC: 103 MG/DL (ref 74–109)
HBA1C MFR BLD: 6.3 % (ref 4–6)
HCT VFR BLD AUTO: 39.7 % (ref 37–47)
HDLC SERPL-MCNC: 39 MG/DL (ref 65–121)
HGB BLD-MCNC: 12.7 G/DL (ref 12–16)
IMM GRANULOCYTES # BLD: 0 K/UL
LDLC SERPL CALC-MCNC: 54 MG/DL
LYMPHOCYTES # BLD: 2.3 K/UL (ref 1.1–4.5)
LYMPHOCYTES NFR BLD: 40 % (ref 20–40)
MCH RBC QN AUTO: 28.6 PG (ref 27–31)
MCHC RBC AUTO-ENTMCNC: 32 G/DL (ref 33–37)
MCV RBC AUTO: 89.4 FL (ref 81–99)
MICROALBUMIN UR-MCNC: <1.2 MG/DL (ref 0–19)
MICROALBUMIN/CREAT UR-RTO: ABNORMAL MG/G
MONOCYTES # BLD: 0.4 K/UL (ref 0–0.9)
MONOCYTES NFR BLD: 7.7 % (ref 0–10)
NEUTROPHILS # BLD: 2.7 K/UL (ref 1.5–7.5)
NEUTS SEG NFR BLD: 47.4 % (ref 50–65)
PLATELET # BLD AUTO: 327 K/UL (ref 130–400)
PMV BLD AUTO: 10.5 FL (ref 9.4–12.3)
POTASSIUM SERPL-SCNC: 4.6 MMOL/L (ref 3.5–5)
PROT SERPL-MCNC: 7.9 G/DL (ref 6.6–8.7)
RBC # BLD AUTO: 4.44 M/UL (ref 4.2–5.4)
SODIUM SERPL-SCNC: 141 MMOL/L (ref 136–145)
TRIGL SERPL-MCNC: 189 MG/DL (ref 0–149)
TSH SERPL DL<=0.005 MIU/L-ACNC: 3.93 UIU/ML (ref 0.27–4.2)
WBC # BLD AUTO: 5.7 K/UL (ref 4.8–10.8)

## 2023-06-16 RX ORDER — LEVOCETIRIZINE DIHYDROCHLORIDE 5 MG/1
TABLET, FILM COATED ORAL
Qty: 90 TABLET | OUTPATIENT
Start: 2023-06-16

## 2023-06-17 DIAGNOSIS — E11.65 TYPE 2 DIABETES MELLITUS WITH HYPERGLYCEMIA, WITHOUT LONG-TERM CURRENT USE OF INSULIN (HCC): ICD-10-CM

## 2023-06-19 DIAGNOSIS — E11.65 TYPE 2 DIABETES MELLITUS WITH HYPERGLYCEMIA, WITHOUT LONG-TERM CURRENT USE OF INSULIN (HCC): Primary | ICD-10-CM

## 2023-06-19 RX ORDER — ALBUTEROL SULFATE 90 UG/1
AEROSOL, METERED RESPIRATORY (INHALATION)
Qty: 18 EACH | Refills: 1 | Status: SHIPPED | OUTPATIENT
Start: 2023-06-19

## 2023-06-19 NOTE — TELEPHONE ENCOUNTER
Last OV 6/15/2023  Next OV 9/14/2023      Requested Prescriptions     Pending Prescriptions Disp Refills    metFORMIN (GLUCOPHAGE) 1000 MG tablet [Pharmacy Med Name: METFORMIN HCL 1,000 MG TABLET] 60 tablet 1     Sig: TAKE 1 TABLET BY MOUTH TWICE A DAY WITH MEALS

## 2023-06-19 NOTE — TELEPHONE ENCOUNTER
Chaim Deutsch was denied by insurance.  Requesting alternative sent to pharmacy to see if it will be covered     Last OV 6/15/2023  Next OV 9/14/2023      Requested Prescriptions     Pending Prescriptions Disp Refills    Semaglutide,0.25 or 0.5MG/DOS, 2 MG/3ML SOPN 3 mL 3     Sig: Inject into the skin

## 2023-07-12 DIAGNOSIS — E11.65 TYPE 2 DIABETES MELLITUS WITH HYPERGLYCEMIA, WITHOUT LONG-TERM CURRENT USE OF INSULIN (HCC): ICD-10-CM

## 2023-07-19 DIAGNOSIS — R11.0 NAUSEA: ICD-10-CM

## 2023-07-19 RX ORDER — ONDANSETRON 4 MG/1
TABLET, FILM COATED ORAL
Qty: 15 TABLET | Refills: 0 | Status: SHIPPED | OUTPATIENT
Start: 2023-07-19

## 2023-08-04 DIAGNOSIS — R11.0 NAUSEA: ICD-10-CM

## 2023-08-07 RX ORDER — ONDANSETRON 4 MG/1
TABLET, FILM COATED ORAL
Qty: 15 TABLET | Refills: 0 | Status: SHIPPED | OUTPATIENT
Start: 2023-08-07

## 2023-08-09 DIAGNOSIS — E11.65 TYPE 2 DIABETES MELLITUS WITH HYPERGLYCEMIA, WITHOUT LONG-TERM CURRENT USE OF INSULIN (HCC): ICD-10-CM

## 2023-08-09 NOTE — TELEPHONE ENCOUNTER
Gina Jasso called to request a refill on her medication.       Last office visit : 6/15/2023   Next office visit : 9/14/2023     Requested Prescriptions     Pending Prescriptions Disp Refills    metFORMIN (GLUCOPHAGE) 1000 MG tablet [Pharmacy Med Name: METFORMIN HCL 1,000 MG TABLET] 60 tablet 1     Sig: TAKE 1 TABLET BY MOUTH TWICE A [address] [place] [address]

## 2023-08-14 DIAGNOSIS — G47.9 DISTURBANCE IN SLEEP BEHAVIOR: ICD-10-CM

## 2023-08-14 RX ORDER — TRAZODONE HYDROCHLORIDE 50 MG/1
TABLET ORAL
Qty: 60 TABLET | Refills: 2 | Status: SHIPPED | OUTPATIENT
Start: 2023-08-14

## 2023-08-19 DIAGNOSIS — R11.0 NAUSEA: ICD-10-CM

## 2023-08-21 RX ORDER — ONDANSETRON 4 MG/1
TABLET, FILM COATED ORAL
Qty: 15 TABLET | Refills: 0 | Status: SHIPPED | OUTPATIENT
Start: 2023-08-21

## 2023-08-25 NOTE — TELEPHONE ENCOUNTER
Rosy Simons called to request a refill on her medication.       Last office visit : 6/15/2023   Next office visit : 9/14/2023     Requested Prescriptions     Pending Prescriptions Disp Refills    VENTOLIN  (90 Base) MCG/ACT inhaler [Pharmacy Med Name: VENTOLIN HFA 90 MCG INHALER] 18 each 1     Sig: INHALE 2 PUFFS BY MOUTH EVERY 4 HOURS AS NEEDED FOR WHEEZING

## 2023-08-26 DIAGNOSIS — I10 ESSENTIAL HYPERTENSION: ICD-10-CM

## 2023-08-26 DIAGNOSIS — J30.9 ALLERGIC RHINITIS, UNSPECIFIED SEASONALITY, UNSPECIFIED TRIGGER: ICD-10-CM

## 2023-08-28 RX ORDER — ALBUTEROL SULFATE 90 UG/1
AEROSOL, METERED RESPIRATORY (INHALATION)
Qty: 18 EACH | Refills: 1 | Status: SHIPPED | OUTPATIENT
Start: 2023-08-28

## 2023-08-28 RX ORDER — LISINOPRIL 20 MG/1
TABLET ORAL
Qty: 90 TABLET | Refills: 1 | Status: SHIPPED | OUTPATIENT
Start: 2023-08-28

## 2023-08-28 RX ORDER — LEVOCETIRIZINE DIHYDROCHLORIDE 5 MG/1
TABLET, FILM COATED ORAL
Qty: 90 TABLET | Refills: 1 | Status: SHIPPED | OUTPATIENT
Start: 2023-08-28

## 2023-09-07 DIAGNOSIS — E11.65 TYPE 2 DIABETES MELLITUS WITH HYPERGLYCEMIA, WITHOUT LONG-TERM CURRENT USE OF INSULIN (HCC): ICD-10-CM

## 2023-09-11 DIAGNOSIS — G47.9 DISTURBANCE IN SLEEP BEHAVIOR: ICD-10-CM

## 2023-09-11 RX ORDER — TRAZODONE HYDROCHLORIDE 50 MG/1
50 TABLET ORAL SEE ADMIN INSTRUCTIONS
Qty: 60 TABLET | Refills: 2 | Status: SHIPPED | OUTPATIENT
Start: 2023-09-11

## 2023-09-13 DIAGNOSIS — E11.65 TYPE 2 DIABETES MELLITUS WITH HYPERGLYCEMIA, WITHOUT LONG-TERM CURRENT USE OF INSULIN (HCC): ICD-10-CM

## 2023-09-14 RX ORDER — SEMAGLUTIDE 0.68 MG/ML
INJECTION, SOLUTION SUBCUTANEOUS
Qty: 9 ML | Refills: 1 | Status: SHIPPED | OUTPATIENT
Start: 2023-09-14

## 2023-09-21 ENCOUNTER — OFFICE VISIT (OUTPATIENT)
Dept: FAMILY MEDICINE CLINIC | Age: 58
End: 2023-09-21
Payer: MEDICAID

## 2023-09-21 VITALS
RESPIRATION RATE: 14 BRPM | DIASTOLIC BLOOD PRESSURE: 74 MMHG | WEIGHT: 237.6 LBS | TEMPERATURE: 97.8 F | HEIGHT: 65 IN | SYSTOLIC BLOOD PRESSURE: 124 MMHG | OXYGEN SATURATION: 98 % | HEART RATE: 89 BPM | BODY MASS INDEX: 39.58 KG/M2

## 2023-09-21 DIAGNOSIS — I10 ESSENTIAL HYPERTENSION: ICD-10-CM

## 2023-09-21 DIAGNOSIS — E11.65 TYPE 2 DIABETES MELLITUS WITH HYPERGLYCEMIA, WITHOUT LONG-TERM CURRENT USE OF INSULIN (HCC): Primary | ICD-10-CM

## 2023-09-21 DIAGNOSIS — E78.5 HYPERLIPIDEMIA, UNSPECIFIED HYPERLIPIDEMIA TYPE: ICD-10-CM

## 2023-09-21 DIAGNOSIS — Z12.11 SCREENING FOR MALIGNANT NEOPLASM OF COLON: ICD-10-CM

## 2023-09-21 DIAGNOSIS — R11.0 NAUSEA: ICD-10-CM

## 2023-09-21 PROCEDURE — 99214 OFFICE O/P EST MOD 30 MIN: CPT | Performed by: FAMILY MEDICINE

## 2023-09-21 PROCEDURE — 3074F SYST BP LT 130 MM HG: CPT | Performed by: FAMILY MEDICINE

## 2023-09-21 PROCEDURE — 3044F HG A1C LEVEL LT 7.0%: CPT | Performed by: FAMILY MEDICINE

## 2023-09-21 PROCEDURE — 3078F DIAST BP <80 MM HG: CPT | Performed by: FAMILY MEDICINE

## 2023-09-21 RX ORDER — ONDANSETRON 4 MG/1
4 TABLET, FILM COATED ORAL EVERY 8 HOURS PRN
Qty: 30 TABLET | Refills: 2 | Status: SHIPPED | OUTPATIENT
Start: 2023-09-21

## 2023-09-21 NOTE — PROGRESS NOTES
is clear. No oropharyngeal exudate. Eyes:      General: No scleral icterus. Right eye: No discharge. Left eye: No discharge. Conjunctiva/sclera: Conjunctivae normal.   Neck:      Trachea: No tracheal deviation. Cardiovascular:      Rate and Rhythm: Normal rate and regular rhythm. Heart sounds: Normal heart sounds. No murmur heard. No friction rub. No gallop. Pulmonary:      Effort: Pulmonary effort is normal. No respiratory distress. Breath sounds: Normal breath sounds. No wheezing or rales. Abdominal:      General: Bowel sounds are normal. There is no distension. Palpations: Abdomen is soft. Tenderness: There is no abdominal tenderness. Musculoskeletal:         General: No deformity (No gross deformities of upper or lower extremities) or signs of injury. Normal range of motion. Cervical back: Normal range of motion and neck supple. No muscular tenderness. Right lower leg: No edema. Left lower leg: No edema. Lymphadenopathy:      Cervical: No cervical adenopathy. Skin:     General: Skin is warm and dry. Findings: No erythema or rash. Neurological:      Mental Status: She is alert and oriented to person, place, and time. Cranial Nerves: No cranial nerve deficit. Psychiatric:         Mood and Affect: Mood normal.         Behavior: Behavior normal.         Thought Content:  Thought content normal.           Data Reviewed and Summarized       Labs:     Imaging/Testing:        Kerman Nissen, MD

## 2023-09-25 DIAGNOSIS — E55.9 VITAMIN D DEFICIENCY: ICD-10-CM

## 2023-09-26 RX ORDER — ERGOCALCIFEROL 1.25 MG/1
CAPSULE ORAL
Qty: 12 CAPSULE | Refills: 1 | Status: SHIPPED | OUTPATIENT
Start: 2023-09-26

## 2023-10-01 ASSESSMENT — ENCOUNTER SYMPTOMS
ABDOMINAL PAIN: 0
NAUSEA: 0
EYE DISCHARGE: 0
SORE THROAT: 0
BACK PAIN: 1
VOMITING: 0
RHINORRHEA: 0
EYE PAIN: 0
CONSTIPATION: 0
DIARRHEA: 0
COUGH: 0
SHORTNESS OF BREATH: 0

## 2023-10-05 DIAGNOSIS — E11.65 TYPE 2 DIABETES MELLITUS WITH HYPERGLYCEMIA, WITHOUT LONG-TERM CURRENT USE OF INSULIN (HCC): ICD-10-CM

## 2023-10-16 DIAGNOSIS — G25.0 ESSENTIAL TREMOR: ICD-10-CM

## 2023-10-16 DIAGNOSIS — E78.5 HYPERLIPIDEMIA, UNSPECIFIED HYPERLIPIDEMIA TYPE: ICD-10-CM

## 2023-10-16 RX ORDER — ROPINIROLE 1 MG/1
1 TABLET, FILM COATED ORAL NIGHTLY
Qty: 90 TABLET | Refills: 1 | Status: SHIPPED | OUTPATIENT
Start: 2023-10-16

## 2023-10-16 RX ORDER — ATORVASTATIN CALCIUM 40 MG/1
TABLET, FILM COATED ORAL
Qty: 90 TABLET | Refills: 1 | Status: SHIPPED | OUTPATIENT
Start: 2023-10-16

## 2023-10-16 NOTE — TELEPHONE ENCOUNTER
Kiarra Amalia called to request a refill on her medication.       Last office visit : 9/21/2023   Next office visit : Visit date not found     Requested Prescriptions     Pending Prescriptions Disp Refills    rOPINIRole (REQUIP) 1 MG tablet [Pharmacy Med Name: ROPINIROLE HCL 1 MG TABLET] 90 tablet 1     Sig: TAKE 1 TABLET BY MOUTH NIGHTLY    atorvastatin (LIPITOR) 40 MG tablet [Pharmacy Med Name: ATORVASTATIN 40 MG TABLET] 90 tablet 1     Sig: TAKE 1 TABLET BY MOUTH EVERY DAY

## 2023-10-23 ENCOUNTER — TELEPHONE (OUTPATIENT)
Dept: FAMILY MEDICINE CLINIC | Age: 58
End: 2023-10-23

## 2023-10-23 NOTE — TELEPHONE ENCOUNTER
Patient called stated could not get ozempic. Pharmacy stated was on back order for 2 months. Instructed patient would notify Dr. Kenneth Pan.

## 2023-11-16 ENCOUNTER — OFFICE VISIT (OUTPATIENT)
Dept: GASTROENTEROLOGY | Facility: CLINIC | Age: 58
End: 2023-11-16
Payer: COMMERCIAL

## 2023-11-16 VITALS
HEIGHT: 65 IN | WEIGHT: 233 LBS | DIASTOLIC BLOOD PRESSURE: 78 MMHG | SYSTOLIC BLOOD PRESSURE: 130 MMHG | OXYGEN SATURATION: 98 % | HEART RATE: 61 BPM | BODY MASS INDEX: 38.82 KG/M2 | TEMPERATURE: 97.3 F

## 2023-11-16 DIAGNOSIS — Z86.010 HX OF COLONIC POLYP: Primary | ICD-10-CM

## 2023-11-16 DIAGNOSIS — Z80.0 FAMILY HX OF COLON CANCER: ICD-10-CM

## 2023-11-16 PROBLEM — Z86.0100 HX OF COLONIC POLYP: Status: ACTIVE | Noted: 2023-11-16

## 2023-11-16 NOTE — PROGRESS NOTES
[facility]    Primary Physician Shiva Mascorro MD    11/16/2023    Shirley Colby   1965      Chief Complaint   Patient presents with    Colonoscopy       Subjective     HPI    Shirley Colby is a 57 y.o. female who presents as a referral for preventative maintenance. She has no complaints of nausea or vomiting. No change in bowels. No wt loss. No BRBPR. No melena. No abdominal pain.          Colonoscopy 2/2018 by Dr. Ever Naylor.   Has personal history of colon polyps.     Father had colon cancer.       Past Medical History:   Diagnosis Date    Anxiety     Blurred vision     Carpal tunnel syndrome     Chronic back pain     Colon polyp     COPD (chronic obstructive pulmonary disease)     Diabetes mellitus     Epilepsy     Fatty liver     GERD (gastroesophageal reflux disease)     Hypercholesterolemia     Hyperlipidemia     Hypertension     Insomnia     Neuropathy     BILAT FEET    Numbness and tingling in both hands     Occasional tremors     On home oxygen therapy     OXYGEN AT 2L PER NC AT HS AS NEEDED    Parkinson disease     Rash     UNDER BREAST BILATERAL    Restless leg     Seizures     Weakness of both hands        Past Surgical History:   Procedure Laterality Date    BREAST BIOPSY  2013    right breast    CARDIAC CATHETERIZATION      CATARACT EXTRACTION W/ INTRAOCULAR LENS IMPLANT Right 04/04/2017    Procedure: CATARACT PHACO EXTRACTION WITH INTRAOCULAR LENS IMPLANT, RIGHT;  Surgeon: Huan Lynn MD;  Location: [facility];  Service:     COLONOSCOPY  02/06/2018    EYE SURGERY      TOOK FILM OFF EYE    HYSTERECTOMY      OOPHORECTOMY         Outpatient Medications Marked as Taking for the 11/16/23 encounter (Office Visit) with Keirra Wesley APRN   Medication Sig Dispense Refill    albuterol sulfate  (90 Base) MCG/ACT inhaler Take 2 puffs by mouth Every 4 (Four) Hours As Needed.      atorvastatin (LIPITOR) 40 MG tablet Take 1 tablet by mouth Every Night. 30 tablet 2     diazePAM (VALIUM) 2 MG tablet Take 2.5 tablets by mouth.      gabapentin (NEURONTIN) 300 MG capsule 1 mg.      HYDROcodone-acetaminophen (NORCO)  MG per tablet 1 tablet.      lisinopril (PRINIVIL,ZESTRIL) 20 MG tablet Take 1 tablet by mouth Daily.      metFORMIN (GLUCOPHAGE) 1000 MG tablet Take 1 tablet by mouth 2 (Two) Times a Day With Meals.      ondansetron (ZOFRAN) 4 MG tablet Take 1 tablet by mouth Every 8 (Eight) Hours As Needed.      rOPINIRole (REQUIP) 0.5 MG tablet Take 1 tablet by mouth Daily.      traZODone (DESYREL) 50 MG tablet Take 1 tablet by mouth Every Night.         Allergies   Allergen Reactions    Oxycodone-Acetaminophen Nausea Only       Social History     Socioeconomic History    Marital status:    Tobacco Use    Smoking status: Former     Types: Cigarettes     Quit date: 2009     Years since quittin.9    Smokeless tobacco: Never   Substance and Sexual Activity    Alcohol use: No    Drug use: No    Sexual activity: Defer       Family History   Problem Relation Age of Onset    Colon cancer Father     Stomach cancer Sister     Breast cancer Neg Hx        Review of Systems   Constitutional:  Negative for chills, fever and unexpected weight change.   Respiratory:  Negative for shortness of breath.    Cardiovascular:  Negative for chest pain.   Gastrointestinal:  Negative for abdominal distention, abdominal pain, anal bleeding, blood in stool, constipation, diarrhea, nausea and vomiting.       Objective     Vitals:    23 1015   BP: 130/78   Pulse: 61   Temp: 97.3 °F (36.3 °C)   SpO2: 98%         23  1015   Weight: 106 kg (233 lb)     Body mass index is 38.77 kg/m².    Physical Exam  Vitals reviewed.   Constitutional:       General: She is not in acute distress.  Cardiovascular:      Rate and Rhythm: Normal rate and regular rhythm.      Heart sounds: Normal heart sounds.   Pulmonary:      Effort: Pulmonary effort is normal.      Breath sounds: Normal breath sounds.    Abdominal:      General: Bowel sounds are normal. There is no distension.      Palpations: Abdomen is soft.      Tenderness: There is no abdominal tenderness.   Skin:     General: Skin is warm and dry.   Neurological:      Mental Status: She is alert.         Imaging Results (Most Recent)       None            Assessment & Plan     Diagnoses and all orders for this visit:    1. Hx of colonic polyp (Primary)  -     Case Request; Standing    2. Family hx of colon cancer  -     Case Request; Standing    Other orders  -     Implement Anesthesia Orders Day of Procedure; Standing  -     Obtain Informed Consent; Standing    Schedule colonoscopy. Use miralax prep.                  COLONOSCOPY WITH ANESTHESIA (N/A)  All risks, benefits, alternatives, and indications of colonoscopy procedure have been discussed with the patient. Risks to include perforation of the colon requiring possible surgery or colostomy, risk of bleeding from biopsies or removal of colon tissue, possibility of missing a colon polyp or cancer, or adverse drug reaction.  Benefits to include the diagnosis and management of disease of the colon and rectum. Alternatives to include barium enema, radiographic evaluation, lab testing or no intervention. Pt verbalizes understanding and agrees.     There are no Patient Instructions on file for this visit.    Kierra Wesley, APRN

## 2023-12-04 RX ORDER — ALBUTEROL SULFATE 90 UG/1
AEROSOL, METERED RESPIRATORY (INHALATION)
Qty: 18 EACH | Refills: 1 | Status: SHIPPED | OUTPATIENT
Start: 2023-12-04

## 2023-12-09 DIAGNOSIS — E11.65 TYPE 2 DIABETES MELLITUS WITH HYPERGLYCEMIA, WITHOUT LONG-TERM CURRENT USE OF INSULIN (HCC): ICD-10-CM

## 2023-12-11 DIAGNOSIS — G47.9 DISTURBANCE IN SLEEP BEHAVIOR: ICD-10-CM

## 2023-12-11 RX ORDER — TRAZODONE HYDROCHLORIDE 50 MG/1
50 TABLET ORAL SEE ADMIN INSTRUCTIONS
Qty: 180 TABLET | Refills: 0 | Status: SHIPPED | OUTPATIENT
Start: 2023-12-11

## 2023-12-14 DIAGNOSIS — R11.0 NAUSEA: ICD-10-CM

## 2023-12-15 RX ORDER — ONDANSETRON 4 MG/1
4 TABLET, FILM COATED ORAL EVERY 8 HOURS PRN
Qty: 30 TABLET | Refills: 2 | Status: SHIPPED | OUTPATIENT
Start: 2023-12-15

## 2024-01-31 DIAGNOSIS — E55.9 VITAMIN D DEFICIENCY: ICD-10-CM

## 2024-01-31 DIAGNOSIS — G47.9 DISTURBANCE IN SLEEP BEHAVIOR: ICD-10-CM

## 2024-01-31 DIAGNOSIS — I10 ESSENTIAL HYPERTENSION: ICD-10-CM

## 2024-02-01 RX ORDER — TRAZODONE HYDROCHLORIDE 50 MG/1
TABLET ORAL
Qty: 180 TABLET | Refills: 0 | Status: SHIPPED | OUTPATIENT
Start: 2024-02-01

## 2024-02-01 RX ORDER — LISINOPRIL 20 MG/1
TABLET ORAL
Qty: 90 TABLET | Refills: 1 | Status: SHIPPED | OUTPATIENT
Start: 2024-02-01

## 2024-02-01 RX ORDER — ERGOCALCIFEROL 1.25 MG/1
CAPSULE ORAL
Qty: 12 CAPSULE | Refills: 1 | Status: SHIPPED | OUTPATIENT
Start: 2024-02-01

## 2024-02-01 RX ORDER — ALBUTEROL SULFATE 90 UG/1
AEROSOL, METERED RESPIRATORY (INHALATION)
Qty: 18 EACH | Refills: 1 | Status: SHIPPED | OUTPATIENT
Start: 2024-02-01

## 2024-02-03 DIAGNOSIS — J30.9 ALLERGIC RHINITIS, UNSPECIFIED SEASONALITY, UNSPECIFIED TRIGGER: ICD-10-CM

## 2024-02-05 RX ORDER — LEVOCETIRIZINE DIHYDROCHLORIDE 5 MG/1
TABLET, FILM COATED ORAL
Qty: 90 TABLET | Refills: 1 | Status: SHIPPED | OUTPATIENT
Start: 2024-02-05

## 2024-02-23 DIAGNOSIS — R11.0 NAUSEA: ICD-10-CM

## 2024-02-26 RX ORDER — ONDANSETRON 4 MG/1
4 TABLET, FILM COATED ORAL EVERY 8 HOURS PRN
Qty: 30 TABLET | Refills: 2 | Status: SHIPPED | OUTPATIENT
Start: 2024-02-26

## 2024-03-05 DIAGNOSIS — E11.65 TYPE 2 DIABETES MELLITUS WITH HYPERGLYCEMIA, WITHOUT LONG-TERM CURRENT USE OF INSULIN (HCC): ICD-10-CM

## 2024-03-26 RX ORDER — ALBUTEROL SULFATE 90 UG/1
2 AEROSOL, METERED RESPIRATORY (INHALATION) EVERY 4 HOURS PRN
Qty: 18 EACH | Refills: 1 | Status: SHIPPED | OUTPATIENT
Start: 2024-03-26

## 2024-04-26 DIAGNOSIS — R11.0 NAUSEA: ICD-10-CM

## 2024-04-26 NOTE — TELEPHONE ENCOUNTER
Holly DONALD Gongora called to request a refill on her medication.      Last office visit : 9/21/2023   Next office visit : Visit date not found     Requested Prescriptions     Pending Prescriptions Disp Refills    ondansetron (ZOFRAN) 4 MG tablet [Pharmacy Med Name: ONDANSETRON HCL 4 MG TABLET] 30 tablet 2     Sig: TAKE 1 TABLET BY MOUTH EVERY 8 HOURS AS NEEDED FOR NAUSEA AND VOMITING            Alison Will MA

## 2024-04-27 RX ORDER — ONDANSETRON 4 MG/1
4 TABLET, FILM COATED ORAL EVERY 8 HOURS PRN
Qty: 30 TABLET | Refills: 2 | Status: SHIPPED | OUTPATIENT
Start: 2024-04-27

## 2024-06-06 DIAGNOSIS — R11.0 NAUSEA: ICD-10-CM

## 2024-06-06 RX ORDER — ONDANSETRON 4 MG/1
4 TABLET, FILM COATED ORAL EVERY 8 HOURS PRN
Qty: 30 TABLET | Refills: 2 | Status: SHIPPED | OUTPATIENT
Start: 2024-06-06

## 2024-06-07 DIAGNOSIS — G25.0 ESSENTIAL TREMOR: ICD-10-CM

## 2024-06-07 NOTE — TELEPHONE ENCOUNTER
Holly Gongora called to request a refill on her medication.      Last office visit : 9/21/2023   Next office visit : Visit date not found     Requested Prescriptions     Pending Prescriptions Disp Refills    rOPINIRole (REQUIP) 1 MG tablet [Pharmacy Med Name: ROPINIROLE HCL 1 MG TABLET] 90 tablet 1     Sig: TAKE 1 TABLET BY MOUTH EVERY DAY AT NIGHT            Alison Will MA

## 2024-06-08 DIAGNOSIS — E78.5 HYPERLIPIDEMIA, UNSPECIFIED HYPERLIPIDEMIA TYPE: ICD-10-CM

## 2024-06-10 RX ORDER — ROPINIROLE 1 MG/1
1 TABLET, FILM COATED ORAL NIGHTLY
Qty: 90 TABLET | Refills: 1 | Status: SHIPPED | OUTPATIENT
Start: 2024-06-10

## 2024-06-10 RX ORDER — ATORVASTATIN CALCIUM 40 MG/1
TABLET, FILM COATED ORAL
Qty: 90 TABLET | Refills: 1 | Status: SHIPPED | OUTPATIENT
Start: 2024-06-10

## 2024-06-24 RX ORDER — ALBUTEROL SULFATE 90 UG/1
AEROSOL, METERED RESPIRATORY (INHALATION)
Qty: 18 EACH | Refills: 1 | Status: SHIPPED | OUTPATIENT
Start: 2024-06-24

## 2024-07-11 DIAGNOSIS — G47.9 DISTURBANCE IN SLEEP BEHAVIOR: ICD-10-CM

## 2024-07-11 RX ORDER — TRAZODONE HYDROCHLORIDE 50 MG/1
TABLET ORAL
Qty: 180 TABLET | Refills: 0 | Status: SHIPPED | OUTPATIENT
Start: 2024-07-11

## 2024-07-26 DIAGNOSIS — E11.65 TYPE 2 DIABETES MELLITUS WITH HYPERGLYCEMIA, WITHOUT LONG-TERM CURRENT USE OF INSULIN (HCC): ICD-10-CM

## 2024-07-26 RX ORDER — SEMAGLUTIDE 1.34 MG/ML
1 INJECTION, SOLUTION SUBCUTANEOUS
Qty: 9 ML | Refills: 0 | Status: SHIPPED | OUTPATIENT
Start: 2024-07-26

## 2024-07-26 NOTE — TELEPHONE ENCOUNTER
Last OV- 9/21/2023    Next OV- None Scheduled    Will be informing the patient that she will need to schedule an appointment for future refills.

## 2024-08-02 ENCOUNTER — TELEPHONE (OUTPATIENT)
Dept: FAMILY MEDICINE CLINIC | Age: 59
End: 2024-08-02

## 2024-08-02 DIAGNOSIS — Z12.31 ENCOUNTER FOR SCREENING MAMMOGRAM FOR MALIGNANT NEOPLASM OF BREAST: Primary | ICD-10-CM

## 2024-08-02 NOTE — TELEPHONE ENCOUNTER
----- Message from Kerwin Corea sent at 8/2/2024 10:16 AM CDT -----  Regarding: ECC Message to Provider  ECC Message to Provider    Relationship to Patient: Self     Additional Information PT called in because [facility] at [address], [place] [address]  want to fax over the order for her mammogram. The pt don't have the fax number but the phone number is [phone]  --------------------------------------------------------------------------------------------------------------------------    Call Back Information: OK to leave message on voicemail  Preferred Call Back Number: Phone  [phone] (home)

## 2024-08-03 DIAGNOSIS — R11.0 NAUSEA: ICD-10-CM

## 2024-08-05 RX ORDER — ONDANSETRON 4 MG/1
4 TABLET, FILM COATED ORAL EVERY 8 HOURS PRN
Qty: 30 TABLET | Refills: 2 | Status: SHIPPED | OUTPATIENT
Start: 2024-08-05

## 2024-08-08 ENCOUNTER — OFFICE VISIT (OUTPATIENT)
Dept: FAMILY MEDICINE CLINIC | Age: 59
End: 2024-08-08
Payer: MEDICAID

## 2024-08-08 VITALS
TEMPERATURE: 97.4 F | SYSTOLIC BLOOD PRESSURE: 128 MMHG | WEIGHT: 226.8 LBS | HEART RATE: 76 BPM | OXYGEN SATURATION: 99 % | DIASTOLIC BLOOD PRESSURE: 78 MMHG | BODY MASS INDEX: 37.79 KG/M2 | HEIGHT: 65 IN

## 2024-08-08 DIAGNOSIS — Z12.11 SCREENING FOR MALIGNANT NEOPLASM OF COLON: ICD-10-CM

## 2024-08-08 DIAGNOSIS — G25.0 ESSENTIAL TREMOR: ICD-10-CM

## 2024-08-08 DIAGNOSIS — Z12.31 ENCOUNTER FOR SCREENING MAMMOGRAM FOR MALIGNANT NEOPLASM OF BREAST: ICD-10-CM

## 2024-08-08 DIAGNOSIS — E11.65 TYPE 2 DIABETES MELLITUS WITH HYPERGLYCEMIA, WITHOUT LONG-TERM CURRENT USE OF INSULIN (HCC): Primary | ICD-10-CM

## 2024-08-08 DIAGNOSIS — I10 PRIMARY HYPERTENSION: ICD-10-CM

## 2024-08-08 DIAGNOSIS — E78.5 HYPERLIPIDEMIA, UNSPECIFIED HYPERLIPIDEMIA TYPE: ICD-10-CM

## 2024-08-08 DIAGNOSIS — G47.9 DISTURBANCE IN SLEEP BEHAVIOR: ICD-10-CM

## 2024-08-08 DIAGNOSIS — E11.65 TYPE 2 DIABETES MELLITUS WITH HYPERGLYCEMIA, WITHOUT LONG-TERM CURRENT USE OF INSULIN (HCC): ICD-10-CM

## 2024-08-08 LAB
ALBUMIN SERPL-MCNC: 4.3 G/DL (ref 3.5–5.2)
ALP SERPL-CCNC: 98 U/L (ref 35–104)
ALT SERPL-CCNC: 26 U/L (ref 5–33)
ANION GAP SERPL CALCULATED.3IONS-SCNC: 16 MMOL/L (ref 7–19)
AST SERPL-CCNC: 20 U/L (ref 5–32)
BASOPHILS # BLD: 0.1 K/UL (ref 0–0.2)
BASOPHILS NFR BLD: 1 % (ref 0–1)
BILIRUB SERPL-MCNC: 0.2 MG/DL (ref 0.2–1.2)
BUN SERPL-MCNC: 11 MG/DL (ref 8–23)
CALCIUM SERPL-MCNC: 9.6 MG/DL (ref 8.8–10.2)
CHLORIDE SERPL-SCNC: 102 MMOL/L (ref 98–111)
CHOLEST SERPL-MCNC: 123 MG/DL (ref 160–199)
CO2 SERPL-SCNC: 24 MMOL/L (ref 22–29)
CREAT SERPL-MCNC: 0.7 MG/DL (ref 0.5–0.9)
CREAT UR-MCNC: 63.7 MG/DL (ref 28–217)
EOSINOPHIL # BLD: 0.2 K/UL (ref 0–0.6)
EOSINOPHIL NFR BLD: 3.5 % (ref 0–5)
ERYTHROCYTE [DISTWIDTH] IN BLOOD BY AUTOMATED COUNT: 12.1 % (ref 11.5–14.5)
GLUCOSE SERPL-MCNC: 108 MG/DL (ref 74–109)
HBA1C MFR BLD: 5.7 % (ref 4–6)
HCT VFR BLD AUTO: 41.2 % (ref 37–47)
HDLC SERPL-MCNC: 52 MG/DL (ref 65–121)
HGB BLD-MCNC: 12.8 G/DL (ref 12–16)
IMM GRANULOCYTES # BLD: 0 K/UL
LDLC SERPL CALC-MCNC: 52 MG/DL
LYMPHOCYTES # BLD: 2 K/UL (ref 1.1–4.5)
LYMPHOCYTES NFR BLD: 40.9 % (ref 20–40)
MCH RBC QN AUTO: 28.8 PG (ref 27–31)
MCHC RBC AUTO-ENTMCNC: 31.1 G/DL (ref 33–37)
MCV RBC AUTO: 92.6 FL (ref 81–99)
MICROALBUMIN UR-MCNC: <1.2 MG/DL (ref 0–19)
MICROALBUMIN/CREAT UR-RTO: NORMAL MG/G
MONOCYTES # BLD: 0.3 K/UL (ref 0–0.9)
MONOCYTES NFR BLD: 6.7 % (ref 0–10)
NEUTROPHILS # BLD: 2.4 K/UL (ref 1.5–7.5)
NEUTS SEG NFR BLD: 47.7 % (ref 50–65)
PLATELET # BLD AUTO: 274 K/UL (ref 130–400)
PMV BLD AUTO: 10.6 FL (ref 9.4–12.3)
POTASSIUM SERPL-SCNC: 4.6 MMOL/L (ref 3.5–5)
PROT SERPL-MCNC: 7.7 G/DL (ref 6.6–8.7)
RBC # BLD AUTO: 4.45 M/UL (ref 4.2–5.4)
SODIUM SERPL-SCNC: 142 MMOL/L (ref 136–145)
TRIGL SERPL-MCNC: 97 MG/DL (ref 0–149)
TSH SERPL DL<=0.005 MIU/L-ACNC: 2.03 UIU/ML (ref 0.27–4.2)
WBC # BLD AUTO: 4.9 K/UL (ref 4.8–10.8)

## 2024-08-08 PROCEDURE — 99214 OFFICE O/P EST MOD 30 MIN: CPT | Performed by: FAMILY MEDICINE

## 2024-08-08 PROCEDURE — 3078F DIAST BP <80 MM HG: CPT | Performed by: FAMILY MEDICINE

## 2024-08-08 PROCEDURE — 3074F SYST BP LT 130 MM HG: CPT | Performed by: FAMILY MEDICINE

## 2024-08-08 RX ORDER — TRAZODONE HYDROCHLORIDE 50 MG/1
TABLET ORAL
Qty: 180 TABLET | Refills: 1 | Status: SHIPPED | OUTPATIENT
Start: 2024-08-08

## 2024-08-08 RX ORDER — LISINOPRIL 20 MG/1
20 TABLET ORAL DAILY
Qty: 90 TABLET | Refills: 1 | Status: SHIPPED | OUTPATIENT
Start: 2024-08-08

## 2024-08-08 RX ORDER — SEMAGLUTIDE 1.34 MG/ML
1 INJECTION, SOLUTION SUBCUTANEOUS
Qty: 9 ML | Refills: 1 | Status: SHIPPED | OUTPATIENT
Start: 2024-08-08

## 2024-08-08 SDOH — ECONOMIC STABILITY: INCOME INSECURITY: HOW HARD IS IT FOR YOU TO PAY FOR THE VERY BASICS LIKE FOOD, HOUSING, MEDICAL CARE, AND HEATING?: NOT HARD AT ALL

## 2024-08-08 SDOH — ECONOMIC STABILITY: HOUSING INSECURITY
IN THE LAST 12 MONTHS, WAS THERE A TIME WHEN YOU DID NOT HAVE A STEADY PLACE TO SLEEP OR SLEPT IN A SHELTER (INCLUDING NOW)?: NO

## 2024-08-08 SDOH — ECONOMIC STABILITY: FOOD INSECURITY: WITHIN THE PAST 12 MONTHS, THE FOOD YOU BOUGHT JUST DIDN'T LAST AND YOU DIDN'T HAVE MONEY TO GET MORE.: NEVER TRUE

## 2024-08-08 SDOH — ECONOMIC STABILITY: FOOD INSECURITY: WITHIN THE PAST 12 MONTHS, YOU WORRIED THAT YOUR FOOD WOULD RUN OUT BEFORE YOU GOT MONEY TO BUY MORE.: NEVER TRUE

## 2024-08-08 ASSESSMENT — ENCOUNTER SYMPTOMS
VOMITING: 0
NAUSEA: 0
SHORTNESS OF BREATH: 0
EYE DISCHARGE: 0
DIARRHEA: 0
EYE PAIN: 0
ABDOMINAL PAIN: 0
RHINORRHEA: 0
BACK PAIN: 1
SORE THROAT: 0
CONSTIPATION: 0
COUGH: 0

## 2024-08-08 ASSESSMENT — PATIENT HEALTH QUESTIONNAIRE - PHQ9
SUM OF ALL RESPONSES TO PHQ QUESTIONS 1-9: 0
SUM OF ALL RESPONSES TO PHQ9 QUESTIONS 1 & 2: 0
SUM OF ALL RESPONSES TO PHQ QUESTIONS 1-9: 0
2. FEELING DOWN, DEPRESSED OR HOPELESS: NOT AT ALL
1. LITTLE INTEREST OR PLEASURE IN DOING THINGS: NOT AT ALL

## 2024-08-08 NOTE — PROGRESS NOTES
patient is not nervous/anxious.         Allergies   Allergen Reactions    Oxycodone-Acetaminophen     Oxycodone-Acetaminophen Nausea Only       Outpatient Medications Prior to Visit   Medication Sig Dispense Refill    ondansetron (ZOFRAN) 4 MG tablet TAKE 1 TABLET BY MOUTH EVERY 8 HOURS AS NEEDED FOR NAUSEA AND VOMITING 30 tablet 2    VENTOLIN  (90 Base) MCG/ACT inhaler TAKE 2 PUFFS BY MOUTH EVERY 4 HOURS AS NEEDED FOR WHEEZE 18 each 1    rOPINIRole (REQUIP) 1 MG tablet TAKE 1 TABLET BY MOUTH EVERY DAY AT NIGHT 90 tablet 1    atorvastatin (LIPITOR) 40 MG tablet TAKE 1 TABLET BY MOUTH EVERY DAY 90 tablet 1    vitamin D (ERGOCALCIFEROL) 1.25 MG (39238 UT) CAPS capsule TAKE 1 CAPSULE BY MOUTH ONE TIME PER WEEK 12 capsule 1    gabapentin (NEURONTIN) 300 MG capsule       HYDROcodone-acetaminophen (NORCO)  MG per tablet 1 TABLET(S) ORAL THREE TIMES A DAY AS NEEDED      Semaglutide, 1 MG/DOSE, (OZEMPIC, 1 MG/DOSE,) 4 MG/3ML SOPN sc injection INJECT 1 MG INTO THE SKIN EVERY 7 DAYS 9 mL 0    traZODone (DESYREL) 50 MG tablet TAKE 1-2 TABLETS AT NIGHT FOR SLEEP 180 tablet 0    metFORMIN (GLUCOPHAGE) 1000 MG tablet TAKE 1 TABLET BY MOUTH TWICE A DAY WITH FOOD 60 tablet 1    levocetirizine (XYZAL) 5 MG tablet TAKE 1 TABLET BY MOUTH EVERY DAY AT NIGHT 90 tablet 1    lisinopril (PRINIVIL;ZESTRIL) 20 MG tablet TAKE 1 TABLET BY MOUTH EVERY DAY 90 tablet 1    Budeson-Glycopyrrol-Formoterol (BREZTRI AEROSPHERE) 160-9-4.8 MCG/ACT AERO Inhale 2 puffs into the lungs in the morning and at bedtime 1 each 0    Diazepam (VALIUM PO) Take 5 mg by mouth daily.       No facility-administered medications prior to visit.        Past Medical History:   Diagnosis Date    Anxiety     Chronic back pain     COPD (chronic obstructive pulmonary disease) (HCC)     Diabetes mellitus (HCC)     hx. of diabetes, no meds    Epilepsy (HCC)     GERD (gastroesophageal reflux disease)     History of colon polyps     History of seizures

## 2024-08-20 DIAGNOSIS — J30.9 ALLERGIC RHINITIS, UNSPECIFIED SEASONALITY, UNSPECIFIED TRIGGER: ICD-10-CM

## 2024-08-20 RX ORDER — ALBUTEROL SULFATE 90 UG/1
AEROSOL, METERED RESPIRATORY (INHALATION)
Qty: 18 EACH | Refills: 1 | Status: SHIPPED | OUTPATIENT
Start: 2024-08-20

## 2024-08-20 RX ORDER — LEVOCETIRIZINE DIHYDROCHLORIDE 5 MG/1
TABLET, FILM COATED ORAL
Qty: 90 TABLET | Refills: 1 | Status: SHIPPED | OUTPATIENT
Start: 2024-08-20

## 2024-09-18 DIAGNOSIS — R11.0 NAUSEA: ICD-10-CM

## 2024-09-18 DIAGNOSIS — E55.9 VITAMIN D DEFICIENCY: ICD-10-CM

## 2024-09-18 RX ORDER — ERGOCALCIFEROL 1.25 MG/1
CAPSULE, LIQUID FILLED ORAL
Qty: 12 CAPSULE | Refills: 1 | Status: SHIPPED | OUTPATIENT
Start: 2024-09-18

## 2024-09-18 RX ORDER — ONDANSETRON 4 MG/1
4 TABLET, FILM COATED ORAL EVERY 8 HOURS PRN
Qty: 30 TABLET | Refills: 2 | Status: SHIPPED | OUTPATIENT
Start: 2024-09-18

## 2024-11-05 RX ORDER — ALBUTEROL SULFATE 90 UG/1
2 INHALANT RESPIRATORY (INHALATION) EVERY 4 HOURS PRN
Qty: 18 EACH | Refills: 1 | Status: SHIPPED | OUTPATIENT
Start: 2024-11-05

## 2024-11-19 ENCOUNTER — OFFICE VISIT (OUTPATIENT)
Age: 59
End: 2024-11-19

## 2024-11-19 VITALS — HEIGHT: 65 IN | BODY MASS INDEX: 38.15 KG/M2 | WEIGHT: 229 LBS

## 2024-11-19 DIAGNOSIS — M79.642 PAIN IN LEFT HAND: ICD-10-CM

## 2024-11-19 DIAGNOSIS — M65.322 TRIGGER FINGER, LEFT INDEX FINGER: Primary | ICD-10-CM

## 2024-11-19 DIAGNOSIS — M19.042 PRIMARY OSTEOARTHRITIS OF LEFT HAND: ICD-10-CM

## 2024-11-19 DIAGNOSIS — G56.22 ULNAR NEUROPATHY OF LEFT UPPER EXTREMITY: ICD-10-CM

## 2024-11-19 RX ORDER — BETAMETHASONE SODIUM PHOSPHATE AND BETAMETHASONE ACETATE 3; 3 MG/ML; MG/ML
3 INJECTION, SUSPENSION INTRA-ARTICULAR; INTRALESIONAL; INTRAMUSCULAR; SOFT TISSUE ONCE
Status: COMPLETED | OUTPATIENT
Start: 2024-11-19 | End: 2024-11-19

## 2024-11-19 RX ORDER — LIDOCAINE HYDROCHLORIDE 10 MG/ML
0.5 INJECTION, SOLUTION INFILTRATION; PERINEURAL ONCE
Status: COMPLETED | OUTPATIENT
Start: 2024-11-19 | End: 2024-11-19

## 2024-11-19 RX ORDER — CELECOXIB 100 MG/1
100 CAPSULE ORAL DAILY
Qty: 30 CAPSULE | Refills: 1 | Status: SHIPPED | OUTPATIENT
Start: 2024-11-19 | End: 2025-01-18

## 2024-11-19 RX ADMIN — BETAMETHASONE SODIUM PHOSPHATE AND BETAMETHASONE ACETATE 3 MG: 3; 3 INJECTION, SUSPENSION INTRA-ARTICULAR; INTRALESIONAL; INTRAMUSCULAR; SOFT TISSUE at 16:03

## 2024-11-19 RX ADMIN — LIDOCAINE HYDROCHLORIDE 0.5 ML: 10 INJECTION, SOLUTION INFILTRATION; PERINEURAL at 16:03

## 2024-11-19 NOTE — PROGRESS NOTES
Nodule moves with flexion and extension of the respective digits.  Patient exhibits catching and locking of the respective digit.  No appreciable EDC tendon instability.  Hand is warm and perfused.     Imaging  XR HAND LEFT (MIN 3 VIEWS)    Result Date: 11/19/2024  PA, lateral, and oblique x-rays were obtained today of the left hand.  Per my interpretation no acute dislocations or fractures noted.  Diffuse signs of osteoarthritis present throughout left hand.             Christiano Barrera is a 59 y.o. female who presents today with diffuse left hand weakness and pain.  All options for treatment were discussed with the patient.  Patient wishes to proceed with Celebrex for treatment of diffuse osteoarthritis of the left hand.  Patient wishes to proceed with trigger finger corticosteroid injection for the left index finger.  We will also obtain a nerve conduction study to be done at [facility] to further evaluate for ulnar nerve weakness.     Risks including, but not limited to, bleeding, infection and failure to alleviate any or all of the symptoms were discussed.  All questions were answered.     Skin overlying the left index finger was cleansed with an alcohol swab.  A 23-gauge needle was used to inject 0.5 cc of 1% lidocaine and 3 mg of betamethasone into the A1 pulley/flexor tendon sheath.  Band-Aid was placed over the injection site.  Patient tolerated the injection well and was without complication.       This dictation was generated by voice recognition computer software. Although all attempts are made to edit the dictation for accuracy, there may be errors in the transcription that are not intended.    Electronically signed by ANJALI Charles CNP on 11/19/2024 at 4:24 PM

## 2024-12-09 RX ORDER — TRIAMCINOLONE ACETONIDE 5 MG/G
OINTMENT TOPICAL
Qty: 90 G | Refills: 0 | Status: SHIPPED | OUTPATIENT
Start: 2024-12-09

## 2024-12-15 DIAGNOSIS — G25.0 ESSENTIAL TREMOR: ICD-10-CM

## 2024-12-15 DIAGNOSIS — E78.5 HYPERLIPIDEMIA, UNSPECIFIED HYPERLIPIDEMIA TYPE: ICD-10-CM

## 2024-12-16 RX ORDER — ROPINIROLE 1 MG/1
1 TABLET, FILM COATED ORAL NIGHTLY
Qty: 90 TABLET | Refills: 1 | Status: SHIPPED | OUTPATIENT
Start: 2024-12-16

## 2024-12-16 RX ORDER — ATORVASTATIN CALCIUM 40 MG/1
TABLET, FILM COATED ORAL
Qty: 90 TABLET | Refills: 1 | Status: SHIPPED | OUTPATIENT
Start: 2024-12-16

## 2024-12-18 DIAGNOSIS — M79.642 PAIN IN LEFT HAND: ICD-10-CM

## 2024-12-31 RX ORDER — CELECOXIB 200 MG/1
CAPSULE ORAL DAILY
Qty: 30 CAPSULE | Refills: 1 | Status: SHIPPED | OUTPATIENT
Start: 2024-12-31

## 2025-01-02 RX ORDER — ALBUTEROL SULFATE 90 UG/1
AEROSOL, METERED RESPIRATORY (INHALATION)
Qty: 18 EACH | Refills: 1 | Status: SHIPPED | OUTPATIENT
Start: 2025-01-02

## 2025-01-14 DIAGNOSIS — M79.642 PAIN IN LEFT HAND: ICD-10-CM

## 2025-01-22 RX ORDER — CELECOXIB 100 MG/1
CAPSULE ORAL DAILY
Qty: 30 CAPSULE | Refills: 1 | Status: SHIPPED | OUTPATIENT
Start: 2025-01-22

## 2025-02-26 ENCOUNTER — OFFICE VISIT (OUTPATIENT)
Age: 60
End: 2025-02-26
Payer: MEDICAID

## 2025-02-26 VITALS
HEIGHT: 65 IN | OXYGEN SATURATION: 98 % | WEIGHT: 228.8 LBS | TEMPERATURE: 97.6 F | SYSTOLIC BLOOD PRESSURE: 130 MMHG | HEART RATE: 68 BPM | DIASTOLIC BLOOD PRESSURE: 80 MMHG | BODY MASS INDEX: 38.12 KG/M2

## 2025-02-26 DIAGNOSIS — R11.0 NAUSEA: ICD-10-CM

## 2025-02-26 DIAGNOSIS — E55.9 VITAMIN D DEFICIENCY: ICD-10-CM

## 2025-02-26 DIAGNOSIS — E78.5 HYPERLIPIDEMIA, UNSPECIFIED HYPERLIPIDEMIA TYPE: ICD-10-CM

## 2025-02-26 DIAGNOSIS — G25.0 ESSENTIAL TREMOR: ICD-10-CM

## 2025-02-26 DIAGNOSIS — G47.9 DISTURBANCE IN SLEEP BEHAVIOR: ICD-10-CM

## 2025-02-26 DIAGNOSIS — I10 PRIMARY HYPERTENSION: ICD-10-CM

## 2025-02-26 DIAGNOSIS — E11.65 TYPE 2 DIABETES MELLITUS WITH HYPERGLYCEMIA, WITHOUT LONG-TERM CURRENT USE OF INSULIN (HCC): ICD-10-CM

## 2025-02-26 DIAGNOSIS — B37.0 THRUSH: Primary | ICD-10-CM

## 2025-02-26 LAB — HBA1C MFR BLD: 5.7 %

## 2025-02-26 PROCEDURE — 83036 HEMOGLOBIN GLYCOSYLATED A1C: CPT | Performed by: FAMILY MEDICINE

## 2025-02-26 PROCEDURE — 99214 OFFICE O/P EST MOD 30 MIN: CPT | Performed by: FAMILY MEDICINE

## 2025-02-26 PROCEDURE — 3079F DIAST BP 80-89 MM HG: CPT | Performed by: FAMILY MEDICINE

## 2025-02-26 PROCEDURE — 3075F SYST BP GE 130 - 139MM HG: CPT | Performed by: FAMILY MEDICINE

## 2025-02-26 PROCEDURE — 3044F HG A1C LEVEL LT 7.0%: CPT | Performed by: FAMILY MEDICINE

## 2025-02-26 RX ORDER — TRAZODONE HYDROCHLORIDE 50 MG/1
TABLET ORAL
Qty: 180 TABLET | Refills: 1 | Status: SHIPPED | OUTPATIENT
Start: 2025-02-26

## 2025-02-26 RX ORDER — SEMAGLUTIDE 1.34 MG/ML
1 INJECTION, SOLUTION SUBCUTANEOUS
Qty: 9 ML | Refills: 1 | Status: SHIPPED | OUTPATIENT
Start: 2025-02-26

## 2025-02-26 RX ORDER — LISINOPRIL 20 MG/1
20 TABLET ORAL DAILY
Qty: 90 TABLET | Refills: 1 | Status: SHIPPED | OUTPATIENT
Start: 2025-02-26

## 2025-02-26 RX ORDER — ATORVASTATIN CALCIUM 40 MG/1
40 TABLET, FILM COATED ORAL DAILY
Qty: 90 TABLET | Refills: 1 | Status: SHIPPED | OUTPATIENT
Start: 2025-02-26

## 2025-02-26 RX ORDER — ERGOCALCIFEROL 1.25 MG/1
50000 CAPSULE, LIQUID FILLED ORAL WEEKLY
Qty: 12 CAPSULE | Refills: 1 | Status: SHIPPED | OUTPATIENT
Start: 2025-02-26

## 2025-02-26 RX ORDER — ONDANSETRON 4 MG/1
4 TABLET, FILM COATED ORAL EVERY 8 HOURS PRN
Qty: 30 TABLET | Refills: 2 | Status: SHIPPED | OUTPATIENT
Start: 2025-02-26

## 2025-02-26 RX ORDER — NYSTATIN 100000 [USP'U]/ML
500000 SUSPENSION ORAL 4 TIMES DAILY
Qty: 493 ML | Refills: 0 | Status: SHIPPED | OUTPATIENT
Start: 2025-02-26

## 2025-02-26 RX ORDER — ROPINIROLE 0.5 MG/1
1.5 TABLET, FILM COATED ORAL NIGHTLY
Qty: 270 TABLET | Refills: 1 | Status: SHIPPED | OUTPATIENT
Start: 2025-02-26

## 2025-02-26 SDOH — ECONOMIC STABILITY: FOOD INSECURITY: WITHIN THE PAST 12 MONTHS, YOU WORRIED THAT YOUR FOOD WOULD RUN OUT BEFORE YOU GOT MONEY TO BUY MORE.: PATIENT DECLINED

## 2025-02-26 SDOH — ECONOMIC STABILITY: FOOD INSECURITY: WITHIN THE PAST 12 MONTHS, THE FOOD YOU BOUGHT JUST DIDN'T LAST AND YOU DIDN'T HAVE MONEY TO GET MORE.: SOMETIMES TRUE

## 2025-02-26 SDOH — ECONOMIC STABILITY: INCOME INSECURITY: IN THE LAST 12 MONTHS, WAS THERE A TIME WHEN YOU WERE NOT ABLE TO PAY THE MORTGAGE OR RENT ON TIME?: NO

## 2025-02-26 SDOH — ECONOMIC STABILITY: TRANSPORTATION INSECURITY
IN THE PAST 12 MONTHS, HAS THE LACK OF TRANSPORTATION KEPT YOU FROM MEDICAL APPOINTMENTS OR FROM GETTING MEDICATIONS?: NO

## 2025-02-26 SDOH — ECONOMIC STABILITY: TRANSPORTATION INSECURITY
IN THE PAST 12 MONTHS, HAS LACK OF TRANSPORTATION KEPT YOU FROM MEETINGS, WORK, OR FROM GETTING THINGS NEEDED FOR DAILY LIVING?: NO

## 2025-02-26 ASSESSMENT — PATIENT HEALTH QUESTIONNAIRE - PHQ9
3. TROUBLE FALLING OR STAYING ASLEEP: NEARLY EVERY DAY
6. FEELING BAD ABOUT YOURSELF - OR THAT YOU ARE A FAILURE OR HAVE LET YOURSELF OR YOUR FAMILY DOWN: MORE THAN HALF THE DAYS
1. LITTLE INTEREST OR PLEASURE IN DOING THINGS: NEARLY EVERY DAY
SUM OF ALL RESPONSES TO PHQ QUESTIONS 1-9: 18
9. THOUGHTS THAT YOU WOULD BE BETTER OFF DEAD, OR OF HURTING YOURSELF: NOT AT ALL
9. THOUGHTS THAT YOU WOULD BE BETTER OFF DEAD, OR OF HURTING YOURSELF: NOT AT ALL
SUM OF ALL RESPONSES TO PHQ9 QUESTIONS 1 & 2: 5
5. POOR APPETITE OR OVEREATING: MORE THAN HALF THE DAYS
10. IF YOU CHECKED OFF ANY PROBLEMS, HOW DIFFICULT HAVE THESE PROBLEMS MADE IT FOR YOU TO DO YOUR WORK, TAKE CARE OF THINGS AT HOME, OR GET ALONG WITH OTHER PEOPLE: SOMEWHAT DIFFICULT
SUM OF ALL RESPONSES TO PHQ QUESTIONS 1-9: 18
4. FEELING TIRED OR HAVING LITTLE ENERGY: NEARLY EVERY DAY
1. LITTLE INTEREST OR PLEASURE IN DOING THINGS: NEARLY EVERY DAY
8. MOVING OR SPEAKING SO SLOWLY THAT OTHER PEOPLE COULD HAVE NOTICED. OR THE OPPOSITE, BEING SO FIGETY OR RESTLESS THAT YOU HAVE BEEN MOVING AROUND A LOT MORE THAN USUAL: NOT AT ALL
3. TROUBLE FALLING OR STAYING ASLEEP: NEARLY EVERY DAY
6. FEELING BAD ABOUT YOURSELF - OR THAT YOU ARE A FAILURE OR HAVE LET YOURSELF OR YOUR FAMILY DOWN: MORE THAN HALF THE DAYS
8. MOVING OR SPEAKING SO SLOWLY THAT OTHER PEOPLE COULD HAVE NOTICED. OR THE OPPOSITE - BEING SO FIDGETY OR RESTLESS THAT YOU HAVE BEEN MOVING AROUND A LOT MORE THAN USUAL: NOT AT ALL
SUM OF ALL RESPONSES TO PHQ QUESTIONS 1-9: 18
SUM OF ALL RESPONSES TO PHQ QUESTIONS 1-9: 18
2. FEELING DOWN, DEPRESSED OR HOPELESS: MORE THAN HALF THE DAYS
SUM OF ALL RESPONSES TO PHQ QUESTIONS 1-9: 18
7. TROUBLE CONCENTRATING ON THINGS, SUCH AS READING THE NEWSPAPER OR WATCHING TELEVISION: NEARLY EVERY DAY
2. FEELING DOWN, DEPRESSED OR HOPELESS: MORE THAN HALF THE DAYS
10. IF YOU CHECKED OFF ANY PROBLEMS, HOW DIFFICULT HAVE THESE PROBLEMS MADE IT FOR YOU TO DO YOUR WORK, TAKE CARE OF THINGS AT HOME, OR GET ALONG WITH OTHER PEOPLE: SOMEWHAT DIFFICULT
4. FEELING TIRED OR HAVING LITTLE ENERGY: NEARLY EVERY DAY
5. POOR APPETITE OR OVEREATING: MORE THAN HALF THE DAYS
7. TROUBLE CONCENTRATING ON THINGS, SUCH AS READING THE NEWSPAPER OR WATCHING TELEVISION: NEARLY EVERY DAY

## 2025-02-26 NOTE — PROGRESS NOTES
sounds: Normal breath sounds. No wheezing or rales.   Abdominal:      General: Bowel sounds are normal. There is no distension.      Palpations: Abdomen is soft.      Tenderness: There is no abdominal tenderness.   Musculoskeletal:         General: No deformity (No gross deformities of upper or lower extremities) or signs of injury. Normal range of motion.      Cervical back: Normal range of motion and neck supple. No muscular tenderness.      Right lower leg: No edema.      Left lower leg: No edema.   Lymphadenopathy:      Cervical: No cervical adenopathy.   Skin:     General: Skin is warm and dry.      Findings: No erythema or rash.   Neurological:      Mental Status: She is alert and oriented to person, place, and time.      Cranial Nerves: No cranial nerve deficit.   Psychiatric:         Mood and Affect: Mood normal.         Behavior: Behavior normal.         Thought Content: Thought content normal.           Data Reviewed and Summarized       Labs:     Imaging/Testing:        ADELA BABIN MD

## 2025-03-03 RX ORDER — ALBUTEROL SULFATE 90 UG/1
AEROSOL, METERED RESPIRATORY (INHALATION)
Qty: 18 EACH | Refills: 1 | Status: SHIPPED | OUTPATIENT
Start: 2025-03-03

## 2025-03-08 ASSESSMENT — ENCOUNTER SYMPTOMS
SORE THROAT: 0
ABDOMINAL PAIN: 0
VOMITING: 0
EYE DISCHARGE: 0
CONSTIPATION: 0
SHORTNESS OF BREATH: 0
BACK PAIN: 1
DIARRHEA: 0
NAUSEA: 0
COUGH: 0
EYE PAIN: 0
RHINORRHEA: 0

## 2025-05-02 DIAGNOSIS — R11.0 NAUSEA: ICD-10-CM

## 2025-05-05 RX ORDER — ONDANSETRON 4 MG/1
4 TABLET, FILM COATED ORAL EVERY 8 HOURS PRN
Qty: 30 TABLET | Refills: 2 | Status: SHIPPED | OUTPATIENT
Start: 2025-05-05

## 2025-05-05 RX ORDER — TRIAMCINOLONE ACETONIDE 5 MG/G
OINTMENT TOPICAL
Qty: 90 G | Refills: 0 | Status: SHIPPED | OUTPATIENT
Start: 2025-05-05

## 2025-05-19 DIAGNOSIS — M79.642 PAIN IN LEFT HAND: ICD-10-CM

## 2025-05-20 ENCOUNTER — APPOINTMENT (OUTPATIENT)
Dept: GENERAL RADIOLOGY | Facility: HOSPITAL | Age: 60
End: 2025-05-20
Payer: COMMERCIAL

## 2025-05-20 PROCEDURE — 71046 X-RAY EXAM CHEST 2 VIEWS: CPT

## 2025-05-28 RX ORDER — CELECOXIB 100 MG/1
CAPSULE ORAL DAILY
Qty: 30 CAPSULE | Refills: 1 | OUTPATIENT
Start: 2025-05-28

## 2025-06-30 RX ORDER — TRIAMCINOLONE ACETONIDE 5 MG/G
OINTMENT TOPICAL
Qty: 90 G | Refills: 0 | Status: SHIPPED | OUTPATIENT
Start: 2025-06-30

## 2025-06-30 NOTE — TELEPHONE ENCOUNTER
Holly DONALD Gongora called to request a refill on her medication.      Last office visit : 2/26/2025   Next office visit : 8/26/2025     Requested Prescriptions     Pending Prescriptions Disp Refills    triamcinolone (ARISTOCORT) 0.5 % ointment [Pharmacy Med Name: TRIAMCINOLONE 0.5% OINTMENT] 90 g 0     Sig: APPLY 1 GRAM AT BEDTIME            Maya Coulter RN

## 2025-08-09 DIAGNOSIS — E55.9 VITAMIN D DEFICIENCY: ICD-10-CM

## 2025-08-11 RX ORDER — ERGOCALCIFEROL 1.25 MG/1
50000 CAPSULE, LIQUID FILLED ORAL WEEKLY
Qty: 12 CAPSULE | Refills: 1 | Status: SHIPPED | OUTPATIENT
Start: 2025-08-11

## 2025-08-26 ENCOUNTER — OFFICE VISIT (OUTPATIENT)
Age: 60
End: 2025-08-26
Payer: MEDICAID

## 2025-08-26 VITALS
TEMPERATURE: 97.3 F | DIASTOLIC BLOOD PRESSURE: 88 MMHG | HEIGHT: 65 IN | BODY MASS INDEX: 37.15 KG/M2 | OXYGEN SATURATION: 98 % | WEIGHT: 223 LBS | SYSTOLIC BLOOD PRESSURE: 136 MMHG | HEART RATE: 62 BPM

## 2025-08-26 DIAGNOSIS — E11.65 TYPE 2 DIABETES MELLITUS WITH HYPERGLYCEMIA, WITHOUT LONG-TERM CURRENT USE OF INSULIN (HCC): ICD-10-CM

## 2025-08-26 DIAGNOSIS — M19.90 ARTHRITIS: Primary | ICD-10-CM

## 2025-08-26 DIAGNOSIS — I10 PRIMARY HYPERTENSION: ICD-10-CM

## 2025-08-26 DIAGNOSIS — E78.5 HYPERLIPIDEMIA, UNSPECIFIED HYPERLIPIDEMIA TYPE: ICD-10-CM

## 2025-08-26 DIAGNOSIS — Z12.11 SCREEN FOR COLON CANCER: ICD-10-CM

## 2025-08-26 DIAGNOSIS — G25.0 ESSENTIAL TREMOR: ICD-10-CM

## 2025-08-26 DIAGNOSIS — R11.0 NAUSEA: ICD-10-CM

## 2025-08-26 DIAGNOSIS — R13.10 DYSPHAGIA, UNSPECIFIED TYPE: ICD-10-CM

## 2025-08-26 DIAGNOSIS — G47.9 DISTURBANCE IN SLEEP BEHAVIOR: ICD-10-CM

## 2025-08-26 LAB
ALBUMIN SERPL-MCNC: 4.1 G/DL (ref 3.5–5.2)
ALP SERPL-CCNC: 104 U/L (ref 35–104)
ALT SERPL-CCNC: 25 U/L (ref 10–35)
ANION GAP SERPL CALCULATED.3IONS-SCNC: 10 MMOL/L (ref 8–16)
AST SERPL-CCNC: 25 U/L (ref 10–35)
BASOPHILS # BLD: 0.1 K/UL (ref 0–0.2)
BASOPHILS NFR BLD: 1.2 % (ref 0–1)
BILIRUB SERPL-MCNC: 0.4 MG/DL (ref 0.2–1.2)
BUN SERPL-MCNC: 9 MG/DL (ref 8–23)
CALCIUM SERPL-MCNC: 9.3 MG/DL (ref 8.8–10.2)
CHLORIDE SERPL-SCNC: 104 MMOL/L (ref 98–107)
CHOLEST SERPL-MCNC: 100 MG/DL (ref 0–199)
CO2 SERPL-SCNC: 26 MMOL/L (ref 22–29)
CREAT SERPL-MCNC: 0.7 MG/DL (ref 0.5–0.9)
CREAT UR-MCNC: 33.7 MG/DL (ref 28–217)
EOSINOPHIL # BLD: 0.2 K/UL (ref 0–0.6)
EOSINOPHIL NFR BLD: 4.6 % (ref 0–5)
ERYTHROCYTE [DISTWIDTH] IN BLOOD BY AUTOMATED COUNT: 12.4 % (ref 11.5–14.5)
GLUCOSE SERPL-MCNC: 95 MG/DL (ref 70–99)
HBA1C MFR BLD: 5.8 % (ref 4–5.6)
HCT VFR BLD AUTO: 39.3 % (ref 37–47)
HDLC SERPL-MCNC: 47 MG/DL (ref 40–60)
HGB BLD-MCNC: 12.7 G/DL (ref 12–16)
IMM GRANULOCYTES # BLD: 0 K/UL
LDLC SERPL CALC-MCNC: 40 MG/DL
LYMPHOCYTES # BLD: 1.9 K/UL (ref 1.1–4.5)
LYMPHOCYTES NFR BLD: 45.3 % (ref 20–40)
MCH RBC QN AUTO: 29.5 PG (ref 27–31)
MCHC RBC AUTO-ENTMCNC: 32.3 G/DL (ref 33–37)
MCV RBC AUTO: 91.2 FL (ref 81–99)
MICROALBUMIN UR-MCNC: <1.2 MG/DL (ref 0–1.99)
MICROALBUMIN/CREAT UR-RTO: NORMAL MG/G (ref 0–29)
MONOCYTES # BLD: 0.3 K/UL (ref 0–0.9)
MONOCYTES NFR BLD: 8.2 % (ref 0–10)
NEUTROPHILS # BLD: 1.7 K/UL (ref 1.5–7.5)
NEUTS SEG NFR BLD: 40.5 % (ref 50–65)
PLATELET # BLD AUTO: 267 K/UL (ref 130–400)
PMV BLD AUTO: 10.8 FL (ref 9.4–12.3)
POTASSIUM SERPL-SCNC: 4 MMOL/L (ref 3.5–5.1)
PROT SERPL-MCNC: 7.4 G/DL (ref 6.4–8.3)
RBC # BLD AUTO: 4.31 M/UL (ref 4.2–5.4)
SODIUM SERPL-SCNC: 140 MMOL/L (ref 136–145)
TRIGL SERPL-MCNC: 66 MG/DL (ref 0–149)
TSH SERPL DL<=0.005 MIU/L-ACNC: 2.79 UIU/ML (ref 0.27–4.2)
WBC # BLD AUTO: 4.2 K/UL (ref 4.8–10.8)

## 2025-08-26 PROCEDURE — 99214 OFFICE O/P EST MOD 30 MIN: CPT | Performed by: FAMILY MEDICINE

## 2025-08-26 PROCEDURE — 3044F HG A1C LEVEL LT 7.0%: CPT | Performed by: FAMILY MEDICINE

## 2025-08-26 PROCEDURE — 3075F SYST BP GE 130 - 139MM HG: CPT | Performed by: FAMILY MEDICINE

## 2025-08-26 PROCEDURE — 3079F DIAST BP 80-89 MM HG: CPT | Performed by: FAMILY MEDICINE

## 2025-08-26 RX ORDER — TRAZODONE HYDROCHLORIDE 50 MG/1
TABLET ORAL
Qty: 180 TABLET | Refills: 1 | Status: SHIPPED | OUTPATIENT
Start: 2025-08-26

## 2025-08-26 RX ORDER — ATORVASTATIN CALCIUM 40 MG/1
40 TABLET, FILM COATED ORAL DAILY
Qty: 90 TABLET | Refills: 1 | Status: SHIPPED | OUTPATIENT
Start: 2025-08-26

## 2025-08-26 RX ORDER — LISINOPRIL 20 MG/1
20 TABLET ORAL DAILY
Qty: 90 TABLET | Refills: 1 | Status: SHIPPED | OUTPATIENT
Start: 2025-08-26

## 2025-08-26 RX ORDER — SEMAGLUTIDE 1.34 MG/ML
1 INJECTION, SOLUTION SUBCUTANEOUS
Qty: 9 ML | Refills: 1 | Status: SHIPPED | OUTPATIENT
Start: 2025-08-26

## 2025-08-26 RX ORDER — CELECOXIB 200 MG/1
200 CAPSULE ORAL DAILY
Qty: 90 CAPSULE | Refills: 1 | Status: SHIPPED | OUTPATIENT
Start: 2025-08-26

## 2025-08-26 RX ORDER — ONDANSETRON 4 MG/1
4 TABLET, FILM COATED ORAL EVERY 8 HOURS PRN
Qty: 30 TABLET | Refills: 2 | Status: SHIPPED | OUTPATIENT
Start: 2025-08-26

## 2025-08-26 RX ORDER — ROPINIROLE 0.5 MG/1
1.5 TABLET, FILM COATED ORAL NIGHTLY
Qty: 270 TABLET | Refills: 1 | Status: SHIPPED | OUTPATIENT
Start: 2025-08-26

## (undated) DEVICE — FORCEPS BX 240CM 2.4MM L NDL RAD JAW 4 M00513334

## (undated) DEVICE — CURITY NON-ADHERENT STRIPS: Brand: CURITY

## (undated) DEVICE — VELCLOSE LATEX FREE VELCRO ELASTIC BANDAGE 4INX5YD: Brand: VELCLOSE

## (undated) DEVICE — EYE PAD,OVAL: Brand: CURITY

## (undated) DEVICE — MICROSURGICAL INSTRUMENT HYDRODISSECTION CANNULA 27GA, 11MM BEND: Brand: ALCON

## (undated) DEVICE — SUTURE ETHLN SZ 4-0 L18IN NONABSORBABLE BLK L19MM PS-2 3/8 1667H

## (undated) DEVICE — 1.8MM PREMIUM VACUUM PHACO PACK WITH MICS NEEDLE: Brand: BAUSCH + LOMB

## (undated) DEVICE — SPNG GZ STRL 2S 4X4 12PLY

## (undated) DEVICE — BANDAGE ESMARK 4IN ST

## (undated) DEVICE — ENDO KIT,LOURDES HOSPITAL: Brand: MEDLINE INDUSTRIES, INC.

## (undated) DEVICE — FORCEPS BX 240CM JAW 3.2MM L CAP NDL MIC MESH TTH M00513372

## (undated) DEVICE — GLV SURG BIOGEL M LTX PF 7 1/2

## (undated) DEVICE — PK CATARACT 30

## (undated) DEVICE — SPONGE GZ W4XL4IN RAYON POLY FILL CVR W/ NONWOVEN FAB

## (undated) DEVICE — THE MONARCH® "D" CARTRIDGE IS A SINGLE-USE POLYPROPYLENE CARTRIDGE FOR POSTERIOR CHAMBER IOL DELIVERY: Brand: MONARCH® III

## (undated) DEVICE — CHLORAPREP 26ML ORANGE

## (undated) DEVICE — SKIN MARKER,REGULAR TIP WITH RULER: Brand: DEVON

## (undated) DEVICE — NDL HYPO PRECISIONGLIDE/REG 18G 11/2 PNK

## (undated) DEVICE — CONN FLX BREATHE CIRCT

## (undated) DEVICE — SINGLE USE MEDICAL DEVICE FOR OPHTHALMIC SURGERY: Brand: SIL. COATED I/A 45 STELLARIS 12/B

## (undated) DEVICE — NDL HYPO PRECISIONGLIDE/REG 25G 1IN BLU

## (undated) DEVICE — SYR LUERLOK 3CC 23G 1IN

## (undated) DEVICE — GLV SURG NEOLON 2G PF LF 6.5 STRL

## (undated) DEVICE — RUBBER BANDS 2/PK: Brand: CARDINAL HEALTH

## (undated) DEVICE — UNDERCAST PADDING: Brand: DEROYAL

## (undated) DEVICE — NDL HYPO PRECISIONGLIDE/REG 30G 1/2 BRN

## (undated) DEVICE — GLV SURG NEOLON 2G PF LF 7 STRL

## (undated) DEVICE — EYESHLD OVL 1P/U